# Patient Record
Sex: FEMALE | Race: WHITE | NOT HISPANIC OR LATINO | Employment: OTHER | URBAN - METROPOLITAN AREA
[De-identification: names, ages, dates, MRNs, and addresses within clinical notes are randomized per-mention and may not be internally consistent; named-entity substitution may affect disease eponyms.]

---

## 2017-03-30 ENCOUNTER — ALLSCRIPTS OFFICE VISIT (OUTPATIENT)
Dept: OTHER | Facility: OTHER | Age: 82
End: 2017-03-30

## 2017-05-30 DIAGNOSIS — I10 ESSENTIAL (PRIMARY) HYPERTENSION: ICD-10-CM

## 2017-05-30 DIAGNOSIS — E78.5 HYPERLIPIDEMIA: ICD-10-CM

## 2017-07-13 ENCOUNTER — APPOINTMENT (OUTPATIENT)
Dept: LAB | Facility: HOSPITAL | Age: 82
End: 2017-07-13
Attending: FAMILY MEDICINE
Payer: MEDICARE

## 2017-07-13 ENCOUNTER — TRANSCRIBE ORDERS (OUTPATIENT)
Dept: ADMINISTRATIVE | Facility: HOSPITAL | Age: 82
End: 2017-07-13

## 2017-07-13 DIAGNOSIS — E78.5 HYPERLIPIDEMIA: ICD-10-CM

## 2017-07-13 DIAGNOSIS — I10 ESSENTIAL (PRIMARY) HYPERTENSION: ICD-10-CM

## 2017-07-13 LAB
ALBUMIN SERPL BCP-MCNC: 3.5 G/DL (ref 3.5–5)
ALP SERPL-CCNC: 89 U/L (ref 46–116)
ALT SERPL W P-5'-P-CCNC: 44 U/L (ref 12–78)
ANION GAP SERPL CALCULATED.3IONS-SCNC: 7 MMOL/L (ref 4–13)
AST SERPL W P-5'-P-CCNC: 22 U/L (ref 5–45)
BILIRUB SERPL-MCNC: 0.6 MG/DL (ref 0.2–1)
BUN SERPL-MCNC: 24 MG/DL (ref 5–25)
CALCIUM SERPL-MCNC: 8.7 MG/DL (ref 8.3–10.1)
CHLORIDE SERPL-SCNC: 106 MMOL/L (ref 100–108)
CHOLEST SERPL-MCNC: 147 MG/DL (ref 50–200)
CO2 SERPL-SCNC: 30 MMOL/L (ref 21–32)
CREAT SERPL-MCNC: 1.27 MG/DL (ref 0.6–1.3)
GFR SERPL CREATININE-BSD FRML MDRD: 40.5 ML/MIN/1.73SQ M
GLUCOSE P FAST SERPL-MCNC: 81 MG/DL (ref 65–99)
HDLC SERPL-MCNC: 77 MG/DL (ref 40–60)
LDLC SERPL CALC-MCNC: 58 MG/DL (ref 0–100)
POTASSIUM SERPL-SCNC: 3.9 MMOL/L (ref 3.5–5.3)
PROT SERPL-MCNC: 6.8 G/DL (ref 6.4–8.2)
SODIUM SERPL-SCNC: 143 MMOL/L (ref 136–145)
TRIGL SERPL-MCNC: 59 MG/DL

## 2017-07-13 PROCEDURE — 80053 COMPREHEN METABOLIC PANEL: CPT

## 2017-07-13 PROCEDURE — 36415 COLL VENOUS BLD VENIPUNCTURE: CPT

## 2017-07-13 PROCEDURE — 80061 LIPID PANEL: CPT

## 2017-07-14 ENCOUNTER — ALLSCRIPTS OFFICE VISIT (OUTPATIENT)
Dept: OTHER | Facility: OTHER | Age: 82
End: 2017-07-14

## 2017-08-24 ENCOUNTER — HOSPITAL ENCOUNTER (OUTPATIENT)
Dept: RADIOLOGY | Facility: HOSPITAL | Age: 82
Discharge: HOME | End: 2017-08-24
Attending: FAMILY MEDICINE
Payer: MEDICARE

## 2017-08-24 ENCOUNTER — ALLSCRIPTS OFFICE VISIT (OUTPATIENT)
Dept: OTHER | Facility: OTHER | Age: 82
End: 2017-08-24

## 2017-08-24 ENCOUNTER — TRANSCRIBE ORDERS (OUTPATIENT)
Dept: ADMINISTRATIVE | Facility: HOSPITAL | Age: 82
End: 2017-08-24

## 2017-08-24 DIAGNOSIS — M25.562 PAIN IN LEFT KNEE: ICD-10-CM

## 2017-08-24 DIAGNOSIS — M25.561 PAIN IN RIGHT KNEE: ICD-10-CM

## 2017-08-24 PROCEDURE — 73562 X-RAY EXAM OF KNEE 3: CPT

## 2017-10-13 DIAGNOSIS — R35.0 FREQUENCY OF MICTURITION: ICD-10-CM

## 2017-10-13 DIAGNOSIS — E78.5 HYPERLIPIDEMIA: ICD-10-CM

## 2017-10-13 DIAGNOSIS — I10 ESSENTIAL (PRIMARY) HYPERTENSION: ICD-10-CM

## 2017-10-16 ENCOUNTER — TRANSCRIBE ORDERS (OUTPATIENT)
Dept: ADMINISTRATIVE | Facility: HOSPITAL | Age: 82
End: 2017-10-16

## 2017-10-16 ENCOUNTER — APPOINTMENT (OUTPATIENT)
Dept: LAB | Facility: HOSPITAL | Age: 82
End: 2017-10-16
Attending: FAMILY MEDICINE
Payer: MEDICARE

## 2017-10-16 DIAGNOSIS — I10 ESSENTIAL HYPERTENSION, MALIGNANT: ICD-10-CM

## 2017-10-16 DIAGNOSIS — E78.5 HYPERLIPIDEMIA, UNSPECIFIED HYPERLIPIDEMIA TYPE: ICD-10-CM

## 2017-10-16 DIAGNOSIS — R35.0 URINARY FREQUENCY: ICD-10-CM

## 2017-10-16 DIAGNOSIS — I10 ESSENTIAL HYPERTENSION, MALIGNANT: Primary | ICD-10-CM

## 2017-10-16 LAB
ALBUMIN SERPL BCP-MCNC: 3.5 G/DL (ref 3.5–5)
ALP SERPL-CCNC: 84 U/L (ref 46–116)
ALT SERPL W P-5'-P-CCNC: 29 U/L (ref 12–78)
ANION GAP SERPL CALCULATED.3IONS-SCNC: 7 MMOL/L (ref 4–13)
AST SERPL W P-5'-P-CCNC: 22 U/L (ref 5–45)
BILIRUB SERPL-MCNC: 0.6 MG/DL (ref 0.2–1)
BILIRUB UR QL STRIP: NEGATIVE
BUN SERPL-MCNC: 31 MG/DL (ref 5–25)
CALCIUM SERPL-MCNC: 9 MG/DL (ref 8.3–10.1)
CHLORIDE SERPL-SCNC: 107 MMOL/L (ref 100–108)
CHOLEST SERPL-MCNC: 143 MG/DL (ref 50–200)
CLARITY UR: CLEAR
CO2 SERPL-SCNC: 29 MMOL/L (ref 21–32)
COLOR UR: YELLOW
CREAT SERPL-MCNC: 1.37 MG/DL (ref 0.6–1.3)
GFR SERPL CREATININE-BSD FRML MDRD: 36 ML/MIN/1.73SQ M
GLUCOSE P FAST SERPL-MCNC: 87 MG/DL (ref 65–99)
GLUCOSE UR STRIP-MCNC: NEGATIVE MG/DL
HDLC SERPL-MCNC: 71 MG/DL (ref 40–60)
HGB UR QL STRIP.AUTO: NEGATIVE
KETONES UR STRIP-MCNC: NEGATIVE MG/DL
LDLC SERPL CALC-MCNC: 60 MG/DL (ref 0–100)
LEUKOCYTE ESTERASE UR QL STRIP: NEGATIVE
NITRITE UR QL STRIP: NEGATIVE
PH UR STRIP.AUTO: 6 [PH] (ref 5–9)
POTASSIUM SERPL-SCNC: 4.5 MMOL/L (ref 3.5–5.3)
PROT SERPL-MCNC: 6.9 G/DL (ref 6.4–8.2)
PROT UR STRIP-MCNC: NEGATIVE MG/DL
SODIUM SERPL-SCNC: 143 MMOL/L (ref 136–145)
SP GR UR STRIP.AUTO: 1.02 (ref 1–1.03)
TRIGL SERPL-MCNC: 60 MG/DL
UROBILINOGEN UR QL STRIP.AUTO: 0.2 E.U./DL

## 2017-10-16 PROCEDURE — 81003 URINALYSIS AUTO W/O SCOPE: CPT | Performed by: FAMILY MEDICINE

## 2017-10-16 PROCEDURE — 80053 COMPREHEN METABOLIC PANEL: CPT

## 2017-10-16 PROCEDURE — 36415 COLL VENOUS BLD VENIPUNCTURE: CPT

## 2017-10-16 PROCEDURE — 80061 LIPID PANEL: CPT

## 2017-10-19 ENCOUNTER — GENERIC CONVERSION - ENCOUNTER (OUTPATIENT)
Dept: OTHER | Facility: OTHER | Age: 82
End: 2017-10-19

## 2018-01-11 NOTE — MISCELLANEOUS
Message   Recorded as Task   Date: 07/20/2016 03:24 PM, Created By: Jennifer Mcneill   Task Name: Miscellaneous   Assigned To: [facility],team   Regarding Patient: Oren Machuca, Status: Active   Comment:    Jennifer Mcneill - 20 Jul 2016 3:24 PM     TASK CREATED  Caller: Marcos Pierce, Adult Child; Other; [phone]  dr Luis Jauregui pt pts daughter called to ask you about a vitamin that is sometimes ordered to take with the med namenda  would like a call back   Andie Kelley - 22 Jul 2016 2:23 PM     TASK EDITED  DR GALLEGOS - PT'S DAUGHTER DUSTY IS CALLING ABOUT HER MEDICATION NAMENDA  SHE NEEDS TO SPEAK TO YOU ABOUT IT    SHE WANTS A CALL BACK TODAY  Rossy Bhatia - 26 Jul 2016 4:59 PM     TASK EDITED  DR GALLEGOS - PT'S DAUGHTER DUSTY IS CALLING ABOUT HER MEDICATION NAMENDA  SHE SAID THERE IS A VITAMIN THE PT CAN ALSO TAKE WHILE TAKING THIS MEDICATION  SHE HAS BEEN TRYING TO CALL SINCE LAST WEEK AND NO ONE HAS CALLED HER BACK YET    SHE WANTS A CALL BACK TODAY  Willem Gallegos - 29 Jul 2016 4:15 PM     TASK REPLIED TO: Previously Assigned To Willem Gallegos    Pt called back to discuss dementia and possible vitamin use   Called and left message on machine        Signatures   Electronically signed by : DOMINIQUE Gardner ; Aug  1 2016  2:59PM EST                       (Author)

## 2018-01-13 VITALS
HEIGHT: 62 IN | BODY MASS INDEX: 26.31 KG/M2 | OXYGEN SATURATION: 96 % | HEART RATE: 60 BPM | SYSTOLIC BLOOD PRESSURE: 142 MMHG | DIASTOLIC BLOOD PRESSURE: 86 MMHG | RESPIRATION RATE: 16 BRPM | WEIGHT: 143 LBS | TEMPERATURE: 97.5 F

## 2018-01-14 VITALS
WEIGHT: 143 LBS | SYSTOLIC BLOOD PRESSURE: 150 MMHG | RESPIRATION RATE: 18 BRPM | HEART RATE: 72 BPM | BODY MASS INDEX: 26.31 KG/M2 | TEMPERATURE: 72 F | DIASTOLIC BLOOD PRESSURE: 100 MMHG | OXYGEN SATURATION: 96 % | HEIGHT: 62 IN

## 2018-01-14 VITALS
OXYGEN SATURATION: 97 % | TEMPERATURE: 97.5 F | RESPIRATION RATE: 18 BRPM | DIASTOLIC BLOOD PRESSURE: 62 MMHG | SYSTOLIC BLOOD PRESSURE: 108 MMHG | HEIGHT: 62 IN | HEART RATE: 56 BPM | BODY MASS INDEX: 26.13 KG/M2 | WEIGHT: 142 LBS

## 2018-01-22 VITALS
HEIGHT: 62 IN | SYSTOLIC BLOOD PRESSURE: 140 MMHG | WEIGHT: 142 LBS | BODY MASS INDEX: 26.13 KG/M2 | HEART RATE: 61 BPM | DIASTOLIC BLOOD PRESSURE: 80 MMHG | OXYGEN SATURATION: 96 % | RESPIRATION RATE: 18 BRPM

## 2018-01-24 ENCOUNTER — OFFICE VISIT (OUTPATIENT)
Dept: FAMILY MEDICINE CLINIC | Facility: CLINIC | Age: 83
End: 2018-01-24
Payer: MEDICARE

## 2018-01-24 VITALS
TEMPERATURE: 97.5 F | HEIGHT: 62 IN | OXYGEN SATURATION: 96 % | DIASTOLIC BLOOD PRESSURE: 78 MMHG | SYSTOLIC BLOOD PRESSURE: 118 MMHG | HEART RATE: 60 BPM | RESPIRATION RATE: 18 BRPM | BODY MASS INDEX: 27.64 KG/M2 | WEIGHT: 150.2 LBS

## 2018-01-24 DIAGNOSIS — R39.81 URINARY INCONTINENCE DUE TO COGNITIVE IMPAIRMENT: ICD-10-CM

## 2018-01-24 DIAGNOSIS — G30.1 LATE ONSET ALZHEIMER'S DISEASE WITHOUT BEHAVIORAL DISTURBANCE (HCC): ICD-10-CM

## 2018-01-24 DIAGNOSIS — I10 ESSENTIAL HYPERTENSION: Primary | ICD-10-CM

## 2018-01-24 DIAGNOSIS — E78.2 HYPERLIPEMIA, MIXED: ICD-10-CM

## 2018-01-24 DIAGNOSIS — F02.80 LATE ONSET ALZHEIMER'S DISEASE WITHOUT BEHAVIORAL DISTURBANCE (HCC): ICD-10-CM

## 2018-01-24 LAB
SL AMB  POCT GLUCOSE, UA: NORMAL
SL AMB LEUKOCYTE ESTERASE,UA: NEGATIVE
SL AMB POCT BILIRUBIN,UA: NEGATIVE
SL AMB POCT BLOOD,UA: NEGATIVE
SL AMB POCT CLARITY,UA: CLEAR
SL AMB POCT COLOR,UA: YELLOW
SL AMB POCT KETONES,UA: NEGATIVE
SL AMB POCT NITRITE,UA: NEGATIVE
SL AMB POCT PH,UA: 7
SL AMB POCT SPECIFIC GRAVITY,UA: 1.01

## 2018-01-24 PROCEDURE — 99214 OFFICE O/P EST MOD 30 MIN: CPT | Performed by: FAMILY MEDICINE

## 2018-01-24 RX ORDER — ATORVASTATIN CALCIUM 80 MG/1
1 TABLET, FILM COATED ORAL DAILY
COMMUNITY
Start: 2014-02-19 | End: 2019-01-14 | Stop reason: SDUPTHER

## 2018-01-24 RX ORDER — MEMANTINE HYDROCHLORIDE 28 MG/1
1 CAPSULE, EXTENDED RELEASE ORAL DAILY
COMMUNITY
Start: 2016-03-17 | End: 2018-02-02 | Stop reason: SDUPTHER

## 2018-01-24 RX ORDER — NEBIVOLOL 10 MG/1
1 TABLET ORAL DAILY
COMMUNITY
Start: 2014-09-16 | End: 2019-03-01 | Stop reason: SDUPTHER

## 2018-01-24 RX ORDER — NAPROXEN 375 MG/1
1 TABLET ORAL EVERY 12 HOURS PRN
COMMUNITY
Start: 2017-10-19

## 2018-01-24 RX ORDER — OXYBUTYNIN CHLORIDE 5 MG/1
1 TABLET, EXTENDED RELEASE ORAL DAILY
COMMUNITY
Start: 2015-10-26 | End: 2018-08-24 | Stop reason: ALTCHOICE

## 2018-01-24 RX ORDER — LISINOPRIL 20 MG/1
1 TABLET ORAL DAILY
COMMUNITY
Start: 2014-12-03 | End: 2018-03-06 | Stop reason: SDUPTHER

## 2018-01-24 RX ORDER — ASPIRIN 81 MG/1
81 TABLET, CHEWABLE ORAL DAILY
COMMUNITY

## 2018-01-24 NOTE — PROGRESS NOTES
Assessment/Plan:    No problem-specific Assessment & Plan notes found for this encounter  Diagnoses and all orders for this visit:    Essential hypertension    Late onset Alzheimer's disease without behavioral disturbance    Hyperlipemia, mixed    Other orders  -     aspirin 81 mg chewable tablet; Chew 81 mg daily  -     atorvastatin (LIPITOR) 80 mg tablet; Take 1 tablet by mouth daily  -     nebivolol (BYSTOLIC) 10 mg tablet; Take 1 tablet by mouth daily  -     lisinopril (ZESTRIL) 20 mg tablet; Take 1 tablet by mouth daily  -     Memantine HCl ER (NAMENDA XR) 28 MG CP24; Take 1 capsule by mouth daily  -     naproxen (NAPROSYN) 375 mg tablet; Take 1 tablet by mouth every 12 (twelve) hours as needed  -     oxybutynin (DITROPAN-XL) 5 mg 24 hr tablet; Take 1 tablet by mouth daily  -     POCT urine dip        Subjective:      Patient ID: Justyn Durán is a 81 y o  female  HPI  F/u appt for hypertension  Son states Mom is having urinary incontinence at night  Mom is also having periods of confusion  Pt has dementia  No other new complaints  Lipid panel has been stable and doesn't need repeat now  The following portions of the patient's history were reviewed and updated as appropriate: current medications, past family history, past medical history, past social history, past surgical history and problem list     Review of Systems   Genitourinary:        Urinary incontinence   Psychiatric/Behavioral: Positive for confusion  All other systems reviewed and are negative  Objective:     Physical Exam   Constitutional: She appears well-developed and well-nourished  HENT:   Head: Normocephalic and atraumatic  Cardiovascular: Normal rate and regular rhythm  Pulmonary/Chest: Effort normal and breath sounds normal    Musculoskeletal: Normal range of motion  Neurological: She is alert     Confused at times but not during this visit

## 2018-01-24 NOTE — PATIENT INSTRUCTIONS
Pt will continue her present meds for hypertension and hyperlipidemia which are well controlled  Pt will continue Dementia meds also  Urinary incontinence is a function of dementia    Pt will f/u in 3 months to check hypertension

## 2018-02-02 DIAGNOSIS — G30.8 ALZHEIMER'S DISEASE OF OTHER ONSET WITHOUT BEHAVIORAL DISTURBANCE: Primary | ICD-10-CM

## 2018-02-02 DIAGNOSIS — F02.80 ALZHEIMER'S DISEASE OF OTHER ONSET WITHOUT BEHAVIORAL DISTURBANCE: Primary | ICD-10-CM

## 2018-02-02 RX ORDER — MEMANTINE HYDROCHLORIDE 28 MG/1
1 CAPSULE, EXTENDED RELEASE ORAL DAILY
Qty: 30 CAPSULE | Refills: 4 | Status: SHIPPED | OUTPATIENT
Start: 2018-02-02 | End: 2018-08-01 | Stop reason: SDUPTHER

## 2018-03-06 DIAGNOSIS — I10 ESSENTIAL HYPERTENSION: Primary | ICD-10-CM

## 2018-03-06 RX ORDER — LISINOPRIL 20 MG/1
20 TABLET ORAL DAILY
Qty: 30 TABLET | Refills: 5 | Status: SHIPPED | OUTPATIENT
Start: 2018-03-06 | End: 2018-09-03 | Stop reason: SDUPTHER

## 2018-04-25 ENCOUNTER — OFFICE VISIT (OUTPATIENT)
Dept: FAMILY MEDICINE CLINIC | Facility: CLINIC | Age: 83
End: 2018-04-25
Payer: MEDICARE

## 2018-04-25 VITALS
HEIGHT: 61 IN | OXYGEN SATURATION: 95 % | WEIGHT: 150 LBS | BODY MASS INDEX: 28.32 KG/M2 | HEART RATE: 58 BPM | RESPIRATION RATE: 16 BRPM | SYSTOLIC BLOOD PRESSURE: 110 MMHG | DIASTOLIC BLOOD PRESSURE: 70 MMHG

## 2018-04-25 DIAGNOSIS — E78.2 HYPERLIPEMIA, MIXED: ICD-10-CM

## 2018-04-25 DIAGNOSIS — B35.1 ONYCHOMYCOSIS OF TOENAIL: ICD-10-CM

## 2018-04-25 DIAGNOSIS — F02.80 LATE ONSET ALZHEIMER'S DISEASE WITHOUT BEHAVIORAL DISTURBANCE (HCC): ICD-10-CM

## 2018-04-25 DIAGNOSIS — G30.1 LATE ONSET ALZHEIMER'S DISEASE WITHOUT BEHAVIORAL DISTURBANCE (HCC): ICD-10-CM

## 2018-04-25 DIAGNOSIS — I10 ESSENTIAL HYPERTENSION: Primary | ICD-10-CM

## 2018-04-25 PROCEDURE — 99214 OFFICE O/P EST MOD 30 MIN: CPT | Performed by: FAMILY MEDICINE

## 2018-04-25 NOTE — PROGRESS NOTES
Assessment/Plan:    No problem-specific Assessment & Plan notes found for this encounter  Diagnoses and all orders for this visit:    Essential hypertension    Late onset Alzheimer's disease without behavioral disturbance    Hyperlipemia, mixed    Onychomycosis of toenail        Subjective:      Patient ID: Shaheen Mckeon is a 81 y o  female  This is an 81 y o female the f/u for hypertension  Patient also is on medications for this problem  The patient lives with her son and  he denies any new problems  The following portions of the patient's history were reviewed and updated as appropriate: allergies, current medications, past family history, past medical history, past social history, past surgical history and problem list     Review of Systems   Constitutional: Negative  Respiratory: Negative  Cardiovascular: Negative  Neurological: Negative  Psychiatric/Behavioral: Positive for confusion  Objective:      /70 (BP Location: Left arm, Patient Position: Sitting)   Pulse 58   Resp 16   Ht 5' 0 5" (1 537 m)   Wt 68 kg (150 lb)   SpO2 95%   BMI 28 81 kg/m²          Physical Exam   Constitutional: She is oriented to person, place, and time  Patient is mildly overweight with a BMI of 28 1   Cardiovascular: Normal rate and regular rhythm  Pulmonary/Chest: Effort normal and breath sounds normal    Musculoskeletal: Normal range of motion  Neurological: She is alert and oriented to person, place, and time  Psychiatric: She has a normal mood and affect  Her behavior is normal    Vitals reviewed

## 2018-04-25 NOTE — PROGRESS NOTES
Assessment/Plan:    No problem-specific Assessment & Plan notes found for this encounter  Diagnoses and all orders for this visit:    Essential hypertension    Late onset Alzheimer's disease without behavioral disturbance    Hyperlipemia, mixed    Onychomycosis of toenail        Subjective:      Patient ID: Elham Valdez is a 81 y o  female  This is a follow-up appointment for the 81year-old patient with history of hypertension and dementia  The patient arrived with her son today  The patient did fall yesterday but states no injuries  Her son notes that her dementia is gradually getting worse  She denies any other new complaints today other than some dry eyes when she wakes up in the morning  Her son also notes that she is yellow thickened toenails bilaterally  Presently the son he is cutting her toenails without any difficulty  The following portions of the patient's history were reviewed and updated as appropriate: allergies, current medications, past family history, past medical history, past social history, past surgical history and problem list     Review of Systems   Constitutional: Negative  Eyes:        Patient c/o of dry eyes in the morning  Cardiovascular: Negative  Genitourinary:        Patient has urinary incontinence and is wearing adult diapers   Musculoskeletal: Negative  Psychiatric/Behavioral: Positive for confusion  Patient is having increasing periods of confusion  Objective:      /70 (BP Location: Left arm, Patient Position: Sitting)   Pulse 58   Resp 16   Ht 5' 0 5" (1 537 m)   Wt 68 kg (150 lb)   SpO2 95%   BMI 28 81 kg/m²          Physical Exam   Constitutional: She is oriented to person, place, and time  Patient is mildly overweight with BMI 28 81   Eyes: Conjunctivae and EOM are normal  Pupils are equal, round, and reactive to light  Cardiovascular: Normal rate, regular rhythm and normal heart sounds      Pulmonary/Chest: Effort normal and breath sounds normal    Neurological: She is alert and oriented to person, place, and time  Skin:   The patient has thickened yellow toenails on both feet  Psychiatric: She has a normal mood and affect  Her behavior is normal    Vitals reviewed

## 2018-04-25 NOTE — PATIENT INSTRUCTIONS
The patient blood pressure is stable today  Patient will continue her dose of hypertensive medications  The patient will also continue her medications for Alzheimer's  Her hyperlipidemia is also stable  Next lipid panel scheduled for this fall  Patient will follow up with me in 3 months for recheck of her blood pressure  Patient is on a mycosis of the toenails but her son will continue to care for her nails at this point time  The patient also has urinary incontinence and will continue with his with this problem    Patient will also continue to work told by persistent into your urinary incontinence which is  most likely related to her dementia

## 2018-06-18 ENCOUNTER — TELEPHONE (OUTPATIENT)
Dept: FAMILY MEDICINE CLINIC | Facility: CLINIC | Age: 83
End: 2018-06-18

## 2018-06-18 DIAGNOSIS — D22.9 MULTIPLE ATYPICAL NEVI: Primary | ICD-10-CM

## 2018-06-18 NOTE — TELEPHONE ENCOUNTER
Pt s son called to request an order for pt to see dr Debby [place] to check some moles on her back  would like a call when ready

## 2018-08-01 DIAGNOSIS — G30.8 ALZHEIMER'S DISEASE OF OTHER ONSET WITHOUT BEHAVIORAL DISTURBANCE: ICD-10-CM

## 2018-08-01 DIAGNOSIS — F02.80 ALZHEIMER'S DISEASE OF OTHER ONSET WITHOUT BEHAVIORAL DISTURBANCE: ICD-10-CM

## 2018-08-04 RX ORDER — MEMANTINE HYDROCHLORIDE 28 MG/1
1 CAPSULE, EXTENDED RELEASE ORAL DAILY
Qty: 30 CAPSULE | Refills: 5 | Status: SHIPPED | OUTPATIENT
Start: 2018-08-04 | End: 2019-02-02 | Stop reason: SDUPTHER

## 2018-08-24 ENCOUNTER — OFFICE VISIT (OUTPATIENT)
Dept: FAMILY MEDICINE CLINIC | Facility: CLINIC | Age: 83
End: 2018-08-24
Payer: MEDICARE

## 2018-08-24 VITALS
WEIGHT: 151 LBS | HEART RATE: 65 BPM | BODY MASS INDEX: 29 KG/M2 | SYSTOLIC BLOOD PRESSURE: 120 MMHG | OXYGEN SATURATION: 96 % | DIASTOLIC BLOOD PRESSURE: 86 MMHG | RESPIRATION RATE: 16 BRPM

## 2018-08-24 DIAGNOSIS — I10 ESSENTIAL HYPERTENSION: Primary | ICD-10-CM

## 2018-08-24 DIAGNOSIS — F02.80 LATE ONSET ALZHEIMER'S DISEASE WITHOUT BEHAVIORAL DISTURBANCE (HCC): ICD-10-CM

## 2018-08-24 DIAGNOSIS — G30.1 LATE ONSET ALZHEIMER'S DISEASE WITHOUT BEHAVIORAL DISTURBANCE (HCC): ICD-10-CM

## 2018-08-24 DIAGNOSIS — E78.2 HYPERLIPEMIA, MIXED: ICD-10-CM

## 2018-08-24 DIAGNOSIS — R39.81 URINARY INCONTINENCE DUE TO COGNITIVE IMPAIRMENT: ICD-10-CM

## 2018-08-24 PROCEDURE — 99214 OFFICE O/P EST MOD 30 MIN: CPT | Performed by: FAMILY MEDICINE

## 2018-08-24 RX ORDER — TERBINAFINE HYDROCHLORIDE 250 MG/1
250 TABLET ORAL DAILY
Refills: 0 | COMMUNITY
Start: 2018-07-23 | End: 2018-10-25 | Stop reason: SDUPTHER

## 2018-08-24 NOTE — PATIENT INSTRUCTIONS
The patient's blood pressure and hyperlipidemia are well controlled with medication  These meds will continue  The patient will continue Namenda for her dementia at this point  Her dementia is getting worse  The patient will get a repeat lipid panel in October  Patient will follow up with me in 3 months for her hypertension and dementia  She will stop the oxybutynin for urinary incontinence  Her urinary incontinence is secondary to her dementia

## 2018-08-24 NOTE — PROGRESS NOTES
Assessment/Plan:    No problem-specific Assessment & Plan notes found for this encounter  Diagnoses and all orders for this visit:    Essential hypertension    Late onset Alzheimer's disease without behavioral disturbance    Hyperlipemia, mixed  -     Lipid panel; Future  -     Comprehensive metabolic panel; Future    Urinary incontinence due to cognitive impairment    Other orders  -     terbinafine (LamISIL) 250 mg tablet; Take 250 mg by mouth daily        Subjective:      Patient ID: Charline Montoya is a 81 y o  female  This is a follow-up appointment for 67-year-old female with history of hypertension, hyperlipidemia and dementia  The patient also has a history of urinary incontinence and the prescribed oxybutynin isn't helping  Since her last visit her son notes that her memory is worsening  The patient is having difficulty answering questions and forming sentences  There is family history of dementia includes her 2 sisters and her mother  This son states they have a caretaker in the home 6 days a week secondary to the dementia  The patient is wearing depends diapers at times but forgets to put them on in the early a m  her son states that he and his sister plan to care for the mom at home in spite of the dementia  The following portions of the patient's history were reviewed and updated as appropriate: allergies, current medications, past family history, past medical history, past social history, past surgical history and problem list     Review of Systems   Constitutional: Negative  Respiratory: Negative  Cardiovascular: Negative  Musculoskeletal: Negative  Neurological: Positive for speech difficulty  Psychiatric/Behavioral: Positive for confusion           Objective:      /86 (BP Location: Left arm, Patient Position: Sitting)   Pulse 65   Resp 16   Wt 68 5 kg (151 lb)   SpO2 96%   BMI 29 00 kg/m²          Physical Exam   Constitutional: She appears well-developed and well-nourished  The patient is overweight with a BMI of 29 00   Cardiovascular: Normal rate, regular rhythm, normal heart sounds and intact distal pulses  Pulmonary/Chest: Effort normal and breath sounds normal    Musculoskeletal: Normal range of motion  Neurological:   The patient appears confused at times during this exam    Psychiatric: She has a normal mood and affect  Her behavior is normal    Patient's thought process and judgment are impaired  Vitals reviewed

## 2018-09-03 DIAGNOSIS — I10 ESSENTIAL HYPERTENSION: ICD-10-CM

## 2018-09-03 RX ORDER — LISINOPRIL 20 MG/1
TABLET ORAL
Qty: 30 TABLET | Refills: 5 | Status: SHIPPED | OUTPATIENT
Start: 2018-09-03 | End: 2019-03-13 | Stop reason: SDUPTHER

## 2018-10-24 LAB
ALBUMIN SERPL-MCNC: 4.1 G/DL (ref 3.5–4.7)
ALBUMIN/GLOB SERPL: 1.6 {RATIO} (ref 1.2–2.2)
ALP SERPL-CCNC: 80 IU/L (ref 39–117)
ALT SERPL-CCNC: 24 IU/L (ref 0–32)
AST SERPL-CCNC: 27 IU/L (ref 0–40)
BILIRUB SERPL-MCNC: 0.3 MG/DL (ref 0–1.2)
BUN SERPL-MCNC: 22 MG/DL (ref 8–27)
BUN/CREAT SERPL: 19 (ref 12–28)
CALCIUM SERPL-MCNC: 9.1 MG/DL (ref 8.7–10.3)
CHLORIDE SERPL-SCNC: 105 MMOL/L (ref 96–106)
CHOLEST SERPL-MCNC: 149 MG/DL (ref 100–199)
CO2 SERPL-SCNC: 27 MMOL/L (ref 20–29)
CREAT SERPL-MCNC: 1.17 MG/DL (ref 0.57–1)
GLOBULIN SER-MCNC: 2.5 G/DL (ref 1.5–4.5)
GLUCOSE SERPL-MCNC: 90 MG/DL (ref 65–99)
HDLC SERPL-MCNC: 64 MG/DL
LABCORP COMMENT: NORMAL
LDLC SERPL CALC-MCNC: 72 MG/DL (ref 0–99)
POTASSIUM SERPL-SCNC: 4.6 MMOL/L (ref 3.5–5.2)
PROT SERPL-MCNC: 6.6 G/DL (ref 6–8.5)
SL AMB EGFR AFRICAN AMERICAN: 50 ML/MIN/1.73
SL AMB EGFR NON AFRICAN AMERICAN: 43 ML/MIN/1.73
SL AMB VLDL CHOLESTEROL CALC: 13 MG/DL (ref 5–40)
SODIUM SERPL-SCNC: 145 MMOL/L (ref 134–144)
TRIGL SERPL-MCNC: 66 MG/DL (ref 0–149)

## 2018-10-25 DIAGNOSIS — B35.1 ONYCHOMYCOSIS: Primary | ICD-10-CM

## 2018-10-26 RX ORDER — TERBINAFINE HYDROCHLORIDE 250 MG/1
250 TABLET ORAL DAILY
Qty: 90 TABLET | Refills: 1 | Status: SHIPPED | OUTPATIENT
Start: 2018-10-26 | End: 2019-05-16 | Stop reason: SDUPTHER

## 2018-11-27 ENCOUNTER — APPOINTMENT (EMERGENCY)
Dept: RADIOLOGY | Facility: HOSPITAL | Age: 83
End: 2018-11-27
Payer: MEDICARE

## 2018-11-27 ENCOUNTER — TELEPHONE (OUTPATIENT)
Dept: FAMILY MEDICINE CLINIC | Facility: CLINIC | Age: 83
End: 2018-11-27

## 2018-11-27 ENCOUNTER — HOSPITAL ENCOUNTER (EMERGENCY)
Facility: HOSPITAL | Age: 83
Discharge: HOME | End: 2018-11-27
Attending: EMERGENCY MEDICINE | Admitting: EMERGENCY MEDICINE
Payer: MEDICARE

## 2018-11-27 VITALS
HEART RATE: 56 BPM | SYSTOLIC BLOOD PRESSURE: 158 MMHG | OXYGEN SATURATION: 98 % | DIASTOLIC BLOOD PRESSURE: 66 MMHG | RESPIRATION RATE: 20 BRPM | TEMPERATURE: 97.1 F

## 2018-11-27 DIAGNOSIS — R55 NEAR SYNCOPE: Primary | ICD-10-CM

## 2018-11-27 LAB
ALBUMIN SERPL BCP-MCNC: 3.3 G/DL (ref 3.5–5)
ALP SERPL-CCNC: 95 U/L (ref 46–116)
ALT SERPL W P-5'-P-CCNC: 33 U/L (ref 12–78)
ANION GAP SERPL CALCULATED.3IONS-SCNC: 5 MMOL/L (ref 4–13)
AST SERPL W P-5'-P-CCNC: 25 U/L (ref 5–45)
ATRIAL RATE: 58 BPM
BASOPHILS # BLD AUTO: 0.07 THOUSANDS/ΜL (ref 0–0.1)
BASOPHILS NFR BLD AUTO: 1 % (ref 0–1)
BILIRUB SERPL-MCNC: 0.4 MG/DL (ref 0.2–1)
BILIRUB UR QL STRIP: NEGATIVE
BUN SERPL-MCNC: 22 MG/DL (ref 5–25)
CALCIUM SERPL-MCNC: 8.5 MG/DL (ref 8.3–10.1)
CHLORIDE SERPL-SCNC: 108 MMOL/L (ref 100–108)
CLARITY UR: CLEAR
CO2 SERPL-SCNC: 30 MMOL/L (ref 21–32)
COLOR UR: NORMAL
CREAT SERPL-MCNC: 1.13 MG/DL (ref 0.6–1.3)
EOSINOPHIL # BLD AUTO: 0.21 THOUSAND/ΜL (ref 0–0.61)
EOSINOPHIL NFR BLD AUTO: 3 % (ref 0–6)
ERYTHROCYTE [DISTWIDTH] IN BLOOD BY AUTOMATED COUNT: 13.4 % (ref 11.6–15.1)
GFR SERPL CREATININE-BSD FRML MDRD: 45 ML/MIN/1.73SQ M
GLUCOSE SERPL-MCNC: 88 MG/DL (ref 65–140)
GLUCOSE UR STRIP-MCNC: NEGATIVE MG/DL
HCT VFR BLD AUTO: 42.6 % (ref 34.8–46.1)
HGB BLD-MCNC: 13.9 G/DL (ref 11.5–15.4)
HGB UR QL STRIP.AUTO: NEGATIVE
IMM GRANULOCYTES # BLD AUTO: 0.04 THOUSAND/UL (ref 0–0.2)
IMM GRANULOCYTES NFR BLD AUTO: 1 % (ref 0–2)
KETONES UR STRIP-MCNC: NEGATIVE MG/DL
LEUKOCYTE ESTERASE UR QL STRIP: NEGATIVE
LYMPHOCYTES # BLD AUTO: 1.96 THOUSANDS/ΜL (ref 0.6–4.47)
LYMPHOCYTES NFR BLD AUTO: 30 % (ref 14–44)
MCH RBC QN AUTO: 32.9 PG (ref 26.8–34.3)
MCHC RBC AUTO-ENTMCNC: 32.6 G/DL (ref 31.4–37.4)
MCV RBC AUTO: 101 FL (ref 82–98)
MONOCYTES # BLD AUTO: 0.72 THOUSAND/ΜL (ref 0.17–1.22)
MONOCYTES NFR BLD AUTO: 11 % (ref 4–12)
NEUTROPHILS # BLD AUTO: 3.47 THOUSANDS/ΜL (ref 1.85–7.62)
NEUTS SEG NFR BLD AUTO: 54 % (ref 43–75)
NITRITE UR QL STRIP: NEGATIVE
NRBC BLD AUTO-RTO: 0 /100 WBCS
P AXIS: 63 DEGREES
PH UR STRIP.AUTO: 7 [PH] (ref 5–9)
PLATELET # BLD AUTO: 272 THOUSANDS/UL (ref 149–390)
PMV BLD AUTO: 11.2 FL (ref 8.9–12.7)
POTASSIUM SERPL-SCNC: 3.9 MMOL/L (ref 3.5–5.3)
PR INTERVAL: 200 MS
PROT SERPL-MCNC: 6.7 G/DL (ref 6.4–8.2)
PROT UR STRIP-MCNC: NEGATIVE MG/DL
QRS AXIS: -9 DEGREES
QRSD INTERVAL: 84 MS
QT INTERVAL: 454 MS
QTC INTERVAL: 445 MS
RBC # BLD AUTO: 4.23 MILLION/UL (ref 3.81–5.12)
SODIUM SERPL-SCNC: 143 MMOL/L (ref 136–145)
SP GR UR STRIP.AUTO: 1.01 (ref 1–1.03)
T WAVE AXIS: 60 DEGREES
TROPONIN I SERPL-MCNC: <0.02 NG/ML
UROBILINOGEN UR QL STRIP.AUTO: 0.2 E.U./DL
VENTRICULAR RATE: 58 BPM
WBC # BLD AUTO: 6.47 THOUSAND/UL (ref 4.31–10.16)

## 2018-11-27 PROCEDURE — 73521 X-RAY EXAM HIPS BI 2 VIEWS: CPT

## 2018-11-27 PROCEDURE — 99285 EMERGENCY DEPT VISIT HI MDM: CPT

## 2018-11-27 PROCEDURE — 85025 COMPLETE CBC W/AUTO DIFF WBC: CPT | Performed by: EMERGENCY MEDICINE

## 2018-11-27 PROCEDURE — 36415 COLL VENOUS BLD VENIPUNCTURE: CPT | Performed by: EMERGENCY MEDICINE

## 2018-11-27 PROCEDURE — 84484 ASSAY OF TROPONIN QUANT: CPT | Performed by: EMERGENCY MEDICINE

## 2018-11-27 PROCEDURE — 70450 CT HEAD/BRAIN W/O DYE: CPT

## 2018-11-27 PROCEDURE — 93010 ELECTROCARDIOGRAM REPORT: CPT | Performed by: INTERNAL MEDICINE

## 2018-11-27 PROCEDURE — 72125 CT NECK SPINE W/O DYE: CPT

## 2018-11-27 PROCEDURE — 81003 URINALYSIS AUTO W/O SCOPE: CPT | Performed by: EMERGENCY MEDICINE

## 2018-11-27 PROCEDURE — 70486 CT MAXILLOFACIAL W/O DYE: CPT

## 2018-11-27 PROCEDURE — 93005 ELECTROCARDIOGRAM TRACING: CPT

## 2018-11-27 PROCEDURE — 80053 COMPREHEN METABOLIC PANEL: CPT | Performed by: EMERGENCY MEDICINE

## 2018-11-27 NOTE — TELEPHONE ENCOUNTER
Dr Dax Solares spouse [place] came to cancel the apt today and let you know that she is at the EM    thanks

## 2018-11-27 NOTE — ED PROVIDER NOTES
History  Chief Complaint   Patient presents with    Dizziness     as per EMS, they were called for dizziness  ALS triaged to [facility]  Blood sugar 96  Patient offers no complaints  Patient is an 81-year-old female with pretty significant dementia who was brought in by squad for an episode at home were coursing to the son the patient was walking and suddenly had to lean on the table and she told her son she would feel well, the son went over and she, slumped and him any helped to the ground, and no time she lost consciousness during this episode  Here the patient has no physical complaints, no headache, no dizziness, no chest pain, no abdominal pain, no nausea  Patient is awake and alert done oriented to time or date  Prior to Admission Medications   Prescriptions Last Dose Informant Patient Reported? Taking? NAMENDA XR 28 MG CP24   No No   Sig: take 1 capsule by mouth daily   aspirin 81 mg chewable tablet   Yes No   Sig: Chew 81 mg daily   atorvastatin (LIPITOR) 80 mg tablet   Yes No   Sig: Take 1 tablet by mouth daily   lisinopril (ZESTRIL) 20 mg tablet   No No   Sig: take 1 tablet by mouth once daily   naproxen (NAPROSYN) 375 mg tablet   Yes No   Sig: Take 1 tablet by mouth every 12 (twelve) hours as needed   nebivolol (BYSTOLIC) 10 mg tablet   Yes No   Sig: Take 1 tablet by mouth daily   terbinafine (LamISIL) 250 mg tablet   No No   Sig: Take 1 tablet (250 mg total) by mouth daily for 90 days      Facility-Administered Medications: None       Past Medical History:   Diagnosis Date    Encounter for screening for osteoporosis        No past surgical history on file  Family History   Problem Relation Age of Onset    Alzheimer's disease Mother     Heart disease Father     Alzheimer's disease Family     Heart disease Family      I have reviewed and agree with the history as documented      Social History   Substance Use Topics    Smoking status: Current Every Day Smoker    Smokeless tobacco: Not on file    Alcohol use No        Review of Systems   Unable to perform ROS: Dementia   Respiratory: Negative for chest tightness and shortness of breath  Cardiovascular: Negative for chest pain  Gastrointestinal: Negative for nausea and vomiting  Musculoskeletal: Negative for back pain and neck pain  Neurological: Negative for weakness and numbness  Psychiatric/Behavioral: Negative  Physical Exam  Physical Exam   Constitutional: She appears well-developed and well-nourished  HENT:   Head: Normocephalic and atraumatic  Eyes: Pupils are equal, round, and reactive to light  EOM are normal    Neck: Normal range of motion  Cardiovascular: Normal rate and regular rhythm  Pulmonary/Chest: Effort normal and breath sounds normal  No respiratory distress  Abdominal: Soft  Bowel sounds are normal  She exhibits no distension  There is no tenderness  Musculoskeletal: Normal range of motion  Neurological: She is alert  No cranial nerve deficit  Skin: Skin is warm  There is an old bruise to the patient's right cheek   Psychiatric: She has a normal mood and affect  Nursing note and vitals reviewed        Vital Signs  ED Triage Vitals   Temperature Pulse Respirations Blood Pressure SpO2   11/27/18 0709 11/27/18 0708 11/27/18 0708 11/27/18 0708 11/27/18 0708   (!) 97 1 °F (36 2 °C) 56 12 (!) 189/83 96 %      Temp Source Heart Rate Source Patient Position - Orthostatic VS BP Location FiO2 (%)   11/27/18 0708 11/27/18 0708 11/27/18 0708 11/27/18 0708 --   Oral Monitor Lying Left arm       Pain Score       11/27/18 0708       No Pain           Vitals:    11/27/18 0708 11/27/18 0715 11/27/18 0745 11/27/18 1015   BP: (!) 189/83 (!) 213/97 (!) 166/116 158/66   Pulse: 56 56 62 56   Patient Position - Orthostatic VS: Lying Lying Lying Lying       Visual Acuity      ED Medications  Medications - No data to display    Diagnostic Studies  Results Reviewed     Procedure Component Value Units Date/Time Comprehensive metabolic panel [[id number]]  (Abnormal) Collected:  11/27/18 0808    Lab Status:  Final result Specimen:  Blood from Arm, Right Updated:  11/27/18 0834     Sodium 143 mmol/L      Potassium 3 9 mmol/L      Chloride 108 mmol/L      CO2 30 mmol/L      ANION GAP 5 mmol/L      BUN 22 mg/dL      Creatinine 1 13 mg/dL      Glucose 88 mg/dL      Calcium 8 5 mg/dL      AST 25 U/L      ALT 33 U/L      Alkaline Phosphatase 95 U/L      Total Protein 6 7 g/dL      Albumin 3 3 (L) g/dL      Total Bilirubin 0 40 mg/dL      eGFR 45 ml/min/1 73sq m     Narrative:         National Kidney Disease Education Program recommendations are as follows:  GFR calculation is accurate only with a steady state creatinine  Chronic Kidney disease less than 60 ml/min/1 73 sq  meters  Kidney failure less than 15 ml/min/1 73 sq  meters      UA w Reflex to Microscopic [[id number]] Collected:  11/27/18 0746    Lab Status:  Final result Specimen:  Urine from Urine, Clean Catch Updated:  11/27/18 0752     Color, UA Light Yellow     Clarity, UA Clear     Specific Gravity, UA 1 010     pH, UA 7 0     Leukocytes, UA Negative     Nitrite, UA Negative     Protein, UA Negative mg/dl      Glucose, UA Negative mg/dl      Ketones, UA Negative mg/dl      Urobilinogen, UA 0 2 E U /dl      Bilirubin, UA Negative     Blood, UA Negative    Troponin I [[id number]]  (Normal) Collected:  11/27/18 0724    Lab Status:  Final result Specimen:  Blood from Arm, Right Updated:  11/27/18 0748     Troponin I <0 02 ng/mL     CBC and differential [[id number]]  (Abnormal) Collected:  11/27/18 0724    Lab Status:  Final result Specimen:  Blood from Arm, Right Updated:  11/27/18 0730     WBC 6 47 Thousand/uL      RBC 4 23 Million/uL      Hemoglobin 13 9 g/dL      Hematocrit 42 6 %       (H) fL      MCH 32 9 pg      MCHC 32 6 g/dL      RDW 13 4 %      MPV 11 2 fL      Platelets 801 Thousands/uL      nRBC 0 /100 WBCs      Neutrophils Relative 54 %      Immat GRANS % 1 %      Lymphocytes Relative 30 %      Monocytes Relative 11 %      Eosinophils Relative 3 %      Basophils Relative 1 %      Neutrophils Absolute 3 47 Thousands/µL      Immature Grans Absolute 0 04 Thousand/uL      Lymphocytes Absolute 1 96 Thousands/µL      Monocytes Absolute 0 72 Thousand/µL      Eosinophils Absolute 0 21 Thousand/µL      Basophils Absolute 0 07 Thousands/µL                  CT facial bones without contrast   Final Result by Romeo Garcia MD (11/27 3218)      No traumatic injury identified  Workstation performed: [id number]         CT cervical spine without contrast   Final Result by Romeo Garcia MD (11/27 2455)      1  No cervical spine fracture or traumatic malalignment  Loss of the normal cervical lordosis which is likely secondary to patient positioning, cervical collar, or muscle spasm  Ligamentous injury cannot be entirely excluded  2   Emphysematous change  Workstation performed: [id number]         CT head without contrast   Final Result by Romeo Garcia MD (11/27 0398)      No acute intracranial abnormality  Microangiopathic changes  Workstation performed: [id number]         XR hips bilateral with ap pelvis 2 vw   Final Result by Brent Fountain MD (11/27 7854)      No acute osseous abnormality  Osteoarthritis of the hips  Chronic disc degeneration lower lumbar spine              Workstation performed: [id number]                    Procedures  ECG 12 Lead Documentation  Date/Time: 11/27/2018 7:04 AM  Performed by: Khris Mon  Authorized by: Khris Mon     Indications / Diagnosis:  Dizziness  ECG reviewed by me, the ED Provider: yes    Patient location:  ED  Previous ECG:     Comparison to cardiac monitor: Yes    Interpretation:     Interpretation: abnormal    Rate:     ECG rate:  58    ECG rate assessment: bradycardic    Rhythm:     Rhythm: sinus rhythm    Ectopy:     Ectopy: none    QRS:     QRS axis: Normal  Conduction:     Conduction: normal    ST segments:     ST segments:  Normal  T waves:     T waves: normal             Phone Contacts  ED Phone Contact    ED Course                               MDM  Number of Diagnoses or Management Options  Near syncope:   Diagnosis management comments: The patient's workup showed no acute abnormalities, discussed all findings with the patient's son  The patient states he feels great and would like to go home  I recommend some follow up with the primary care doctor in several days or bring his mother back to the emergency room for any worsening of symptoms  All the patient's questions were answered to the best my ability and the son agrees with the assessment plan  Amount and/or Complexity of Data Reviewed  Clinical lab tests: ordered and reviewed  Tests in the radiology section of CPT®: ordered and reviewed      CritCare Time    Disposition  Final diagnoses:   Near syncope     Time reflects when diagnosis was documented in both MDM as applicable and the Disposition within this note     Time User Action Codes Description Comment    11/27/2018 10:11 AM Lori Henriquez Noah [R55] Near syncope       ED Disposition     ED Disposition Condition Comment    Discharge  Dhara Humphrey discharge to home/self care      Condition at discharge: Stable        Follow-up Information     Follow up With Specialties Details Why Contact Willard Dunbar DO Family Medicine Schedule an appointment as soon as possible for a visit in 2 days  [address]   [phone]            Discharge Medication List as of 11/27/2018 10:12 AM      CONTINUE these medications which have NOT CHANGED    Details   aspirin 81 mg chewable tablet Chew 81 mg daily, Historical Med      atorvastatin (LIPITOR) 80 mg tablet Take 1 tablet by mouth daily, Starting Wed 2/19/2014, Historical Med      lisinopril (ZESTRIL) 20 mg tablet take 1 tablet by mouth once daily, Normal      NAMENDA XR 28 MG CP24 take 1 capsule by mouth daily, Starting Sat 8/4/2018, Normal      naproxen (NAPROSYN) 375 mg tablet Take 1 tablet by mouth every 12 (twelve) hours as needed, Starting Thu 10/19/2017, Historical Med      nebivolol (BYSTOLIC) 10 mg tablet Take 1 tablet by mouth daily, Starting Tue 9/16/2014, Historical Med      terbinafine (LamISIL) 250 mg tablet Take 1 tablet (250 mg total) by mouth daily for 90 days, Starting Fri 10/26/2018, Until Thu 1/24/2019, Normal           No discharge procedures on file      ED Provider  Electronically Signed by           Julissa Mueller MD  11/28/18 0985

## 2018-11-27 NOTE — DISCHARGE INSTRUCTIONS
Syncope   WHAT YOU NEED TO KNOW:   Syncope is also called fainting or passing out  Syncope is a sudden, temporary loss of consciousness, followed by a fall from a standing or sitting position  Syncope ranges from not serious to a sign of a more serious condition that needs to be treated  You can control some health conditions that cause syncope  Your healthcare providers can help you create a plan to manage syncope and prevent episodes  DISCHARGE INSTRUCTIONS:   Seek care immediately if:   · You are bleeding because you hit your head when you fainted  · You suddenly have double vision, difficulty speaking, numbness, and cannot move your arms or legs  · You have chest pain and trouble breathing  · You vomit blood or material that looks like coffee grounds  · You see blood in your bowel movement  Contact your healthcare provider if:   · You have new or worsening symptoms  · You have another syncope episode  · You have a headache, fast heartbeat, or feel too dizzy to stand up  · You have questions or concerns about your condition or care  Follow up with your healthcare provider as directed:  Write down your questions so you remember to ask them during your visits  Manage syncope:   · Keep a record of your syncope episodes  Include your symptoms and your activity before and after the episode  The record can help your healthcare provider find the cause of your syncope and help you manage episodes  · Sit or lie down when needed  This includes when you feel dizzy, your throat is getting tight, and your vision changes  Raise your legs above the level of your heart  · Take slow, deep breaths if you start to breathe faster with anxiety or fear  This can help decrease dizziness and the feeling that you might faint  · Check your blood pressure often  This is important if you take medicine to lower your blood pressure   Check your blood pressure when you are lying down and when you are standing  Ask how often to check during the day  Keep a record of your blood pressure numbers  Your healthcare provider may use the record to help plan your treatment  Prevent a syncope episode:   · Move slowly and let yourself get used to one position before you move to another position  This is very important when you change from a lying or sitting position to a standing position  Take some deep breaths before you stand up from a lying position  Stand up slowly  Sudden movements may cause a fainting spell  Sit on the side of the bed or couch for a few minutes before you stand up  If you are on bedrest, try to be upright for about 2 hours each day, or as directed  Do not lock your legs if you are standing for a long period of time  Move your legs and bend your knees to keep blood flowing  · Follow your healthcare provider's recommendations  Your provider may  recommend that you drink more liquids to prevent dehydration  You may also need to have more salt to keep your blood pressure from dropping too low and causing syncope  Your provider will tell you how much liquid and sodium to have each day  · Watch for signs of low blood sugar  These include hunger, nervousness, sweating, and fast or fluttery heartbeats  Talk with your healthcare provider about ways to keep your blood sugar level steady  · Do not strain if you are constipated  You may faint if you strain to have a bowel movement  Walking is the best way to get your bowels moving  Eat foods high in fiber to make it easier to have a bowel movement  Good examples are high-fiber cereals, beans, vegetables, and whole-grain breads  Prune juice may help make bowel movements softer  · Be careful in hot weather  Heat can cause a syncope episode  Limit activity done outside on hot days  Physical activity in hot weather can lead to dehydration  This can cause an episode    © 2017 [employer] Information is for End User's use only and may not be sold, redistributed or otherwise used for commercial purposes  All illustrations and images included in CareNotes® are the copyrighted property of A D A M , Inc  or Estevan Valdovinos  The above information is an  only  It is not intended as medical advice for individual conditions or treatments  Talk to your doctor, nurse or pharmacist before following any medical regimen to see if it is safe and effective for you

## 2018-11-27 NOTE — ED NOTES
Ambulated pt around nurses station per Dr Citlaly Rojas  Pt ambulates with steady gait, no c/o dizziness       Bess [facility]  11/27/18 1013

## 2018-12-13 RX ORDER — A/SINGAPORE/GP1908/2015 IVR-180 (H1N1) (AN A/MICHIGAN/45/2015 (H1N1)PDM09-LIKE VIRUS), A/HONG KONG/4801/2014, NYMC X-263B (H3N2) (AN A/HONG KONG/4801/2014-LIKE VIRUS), AND B/BRISBANE/60/2008, WILD TYPE (A B/BRISBANE/60/2008-LIKE VIRUS) 15; 15; 15 UG/.5ML; UG/.5ML; UG/.5ML
INJECTION, SUSPENSION INTRAMUSCULAR
Refills: 0 | COMMUNITY
Start: 2018-09-26 | End: 2018-12-14

## 2018-12-14 ENCOUNTER — OFFICE VISIT (OUTPATIENT)
Dept: FAMILY MEDICINE CLINIC | Facility: CLINIC | Age: 83
End: 2018-12-14
Payer: MEDICARE

## 2018-12-14 VITALS
SYSTOLIC BLOOD PRESSURE: 138 MMHG | TEMPERATURE: 96.5 F | WEIGHT: 154 LBS | BODY MASS INDEX: 29.58 KG/M2 | OXYGEN SATURATION: 95 % | RESPIRATION RATE: 18 BRPM | HEART RATE: 72 BPM | DIASTOLIC BLOOD PRESSURE: 92 MMHG

## 2018-12-14 DIAGNOSIS — G30.1 LATE ONSET ALZHEIMER'S DISEASE WITHOUT BEHAVIORAL DISTURBANCE (HCC): ICD-10-CM

## 2018-12-14 DIAGNOSIS — F02.80 LATE ONSET ALZHEIMER'S DISEASE WITHOUT BEHAVIORAL DISTURBANCE (HCC): ICD-10-CM

## 2018-12-14 DIAGNOSIS — R39.81 URINARY INCONTINENCE DUE TO COGNITIVE IMPAIRMENT: ICD-10-CM

## 2018-12-14 DIAGNOSIS — I10 ESSENTIAL HYPERTENSION: Primary | ICD-10-CM

## 2018-12-14 DIAGNOSIS — E78.2 HYPERLIPEMIA, MIXED: ICD-10-CM

## 2018-12-14 LAB
BACTERIA UR QL AUTO: NORMAL /HPF
BILIRUB UR QL STRIP: NEGATIVE
CLARITY UR: NORMAL
COLOR UR: YELLOW
GLUCOSE UR STRIP-MCNC: NEGATIVE MG/DL
HGB UR QL STRIP.AUTO: NEGATIVE
HYALINE CASTS #/AREA URNS LPF: NORMAL /LPF
KETONES UR STRIP-MCNC: NEGATIVE MG/DL
LEUKOCYTE ESTERASE UR QL STRIP: NEGATIVE
NITRITE UR QL STRIP: NEGATIVE
NON-SQ EPI CELLS URNS QL MICRO: NORMAL /HPF
PH UR STRIP.AUTO: 7.5 [PH] (ref 4.5–8)
PROT UR STRIP-MCNC: NEGATIVE MG/DL
RBC #/AREA URNS AUTO: NORMAL /HPF
SP GR UR STRIP.AUTO: 1.01 (ref 1–1.03)
UROBILINOGEN UR QL STRIP.AUTO: 0.2 E.U./DL
WBC #/AREA URNS AUTO: NORMAL /HPF

## 2018-12-14 PROCEDURE — 81001 URINALYSIS AUTO W/SCOPE: CPT | Performed by: FAMILY MEDICINE

## 2018-12-14 PROCEDURE — 99214 OFFICE O/P EST MOD 30 MIN: CPT | Performed by: FAMILY MEDICINE

## 2018-12-14 PROCEDURE — 87086 URINE CULTURE/COLONY COUNT: CPT | Performed by: FAMILY MEDICINE

## 2018-12-14 NOTE — PATIENT INSTRUCTIONS
The patient's hypertension hyperlipidemia controlled with present medications  Her son states he forgot to give her her medication for blood pressure today which explains why it is mildly elevated  The patient could not urinate today and she was given a urine sample cup to rule out infection secondary to the urinary incontinence  Urine screens in the past have been negative and I think the incontinence is related to her dementia  The patient will continue her dementia medications  Patient will follow up with me in 3 months to check her blood pressure  Her son will call me if he needs further help getting  care for her his mother with her progressing dementia

## 2018-12-14 NOTE — PROGRESS NOTES
Assessment/Plan:    No problem-specific Assessment & Plan notes found for this encounter  Diagnoses and all orders for this visit:    Essential hypertension    Hyperlipemia, mixed    Urinary incontinence due to cognitive impairment  -     Urinalysis with microscopic  -     Urine culture    Late onset Alzheimer's disease without behavioral disturbance        Subjective:      Patient ID: Steph Escudero is a 81 y o  female  This is a follow-up appointment for an 69-year-old white female who has a history of hypertension and dementia  The patient also has a history of hypercholesterolemia  The patient has been a smoker for over 60 years  He denies any new complaints  The patient has fallen twice since her last visit probably related to her dementia and balance issues  Her son states there is caretakers who come to the house when he is out shopping  The patient denies any new complaints  The following portions of the patient's history were reviewed and updated as appropriate: allergies, current medications, past family history, past medical history, past social history, past surgical history and problem list     Review of Systems   Constitutional: Negative  Respiratory: Negative  Cardiovascular: Negative  Genitourinary:        The patient is having periods of incontinence  Neurological: Negative  Psychiatric/Behavioral: Positive for confusion and decreased concentration  Objective:      /92   Pulse 72   Temp (!) 96 5 °F (35 8 °C)   Resp 18   Wt 69 9 kg (154 lb)   SpO2 95%   BMI 29 58 kg/m²          Physical Exam   Constitutional: She appears well-developed and well-nourished  The patient is overweight with a BMI of 29 58   Cardiovascular: Normal rate, regular rhythm and normal heart sounds  Pulmonary/Chest: Effort normal and breath sounds normal    Musculoskeletal: Normal range of motion  Psychiatric: She has a normal mood and affect   Her behavior is normal  Thought content normal    Judgment is becoming more impaired with her dementia  Vitals reviewed

## 2018-12-15 LAB — BACTERIA UR CULT: NORMAL

## 2019-01-14 DIAGNOSIS — E78.2 MIXED HYPERLIPIDEMIA: Primary | ICD-10-CM

## 2019-01-14 DIAGNOSIS — I10 ESSENTIAL HYPERTENSION: Primary | ICD-10-CM

## 2019-01-15 RX ORDER — ATORVASTATIN CALCIUM 80 MG/1
TABLET, FILM COATED ORAL
Qty: 90 TABLET | Refills: 3 | Status: SHIPPED | OUTPATIENT
Start: 2019-01-15 | End: 2020-01-02

## 2019-01-17 ENCOUNTER — TELEPHONE (OUTPATIENT)
Dept: FAMILY MEDICINE CLINIC | Facility: CLINIC | Age: 84
End: 2019-01-17

## 2019-01-17 NOTE — TELEPHONE ENCOUNTER
Dr Gissell Rowan  Patient's son came in saying they received a letter from their insurance company stating they were no longer covering [address], unless it was medically necessary  Asking for you to please write a letter explaining the need for his mother to continue on this medication   Please call when letter is available to   Thank you  ciro

## 2019-01-23 ENCOUNTER — OFFICE VISIT (OUTPATIENT)
Dept: FAMILY MEDICINE CLINIC | Facility: CLINIC | Age: 84
End: 2019-01-23
Payer: MEDICARE

## 2019-01-23 ENCOUNTER — TELEPHONE (OUTPATIENT)
Dept: FAMILY MEDICINE CLINIC | Facility: CLINIC | Age: 84
End: 2019-01-23

## 2019-01-23 VITALS
BODY MASS INDEX: 28.51 KG/M2 | SYSTOLIC BLOOD PRESSURE: 127 MMHG | OXYGEN SATURATION: 95 % | HEART RATE: 78 BPM | WEIGHT: 151 LBS | DIASTOLIC BLOOD PRESSURE: 70 MMHG | HEIGHT: 61 IN | TEMPERATURE: 97.6 F

## 2019-01-23 DIAGNOSIS — J01.40 ACUTE NON-RECURRENT PANSINUSITIS: Primary | ICD-10-CM

## 2019-01-23 PROCEDURE — 99213 OFFICE O/P EST LOW 20 MIN: CPT | Performed by: NURSE PRACTITIONER

## 2019-01-23 RX ORDER — FLUTICASONE PROPIONATE 50 MCG
2 SPRAY, SUSPENSION (ML) NASAL DAILY
Qty: 16 G | Refills: 0 | Status: ON HOLD | OUTPATIENT
Start: 2019-01-23 | End: 2020-01-24

## 2019-01-23 RX ORDER — AMOXICILLIN AND CLAVULANATE POTASSIUM 875; 125 MG/1; MG/1
1 TABLET, FILM COATED ORAL EVERY 12 HOURS SCHEDULED
Qty: 14 TABLET | Refills: 0 | Status: SHIPPED | OUTPATIENT
Start: 2019-01-23 | End: 2019-01-30

## 2019-01-23 NOTE — PROGRESS NOTES
Assessment/Plan:    1  Take medication as prescribed   2  Follow-up condition changes or worsens     Diagnoses and all orders for this visit:    Acute non-recurrent pansinusitis  -     amoxicillin-clavulanate (AUGMENTIN) 875-125 mg per tablet; Take 1 tablet by mouth every 12 (twelve) hours for 7 days  -     fluticasone (FLONASE) 50 mcg/act nasal spray; 2 sprays into each nostril daily          Subjective:      Patient ID: Bennett Joseph is a 81 y o  female  52-year-old female presents with cough for about a week  Son reports he was  Sick with similar symptoms  Some nasal congestion  Wet cough  Denies wheezing  Tried some Mucinex  Has been using it for 3 days  Not sure if they feel as though there is any improvement  Not feel sick  The following portions of the patient's history were reviewed and updated as appropriate: allergies and current medications  Review of Systems   Constitutional: Negative  HENT: Positive for sinus pain, sinus pressure and sore throat  Respiratory: Positive for cough  Cardiovascular: Negative  Objective:      /70   Pulse 78   Temp 97 6 °F (36 4 °C)   Ht 5' 1" (1 549 m)   Wt 68 5 kg (151 lb)   SpO2 95%   BMI 28 53 kg/m²          Physical Exam   Constitutional: She appears well-developed and well-nourished  HENT:   Head: Normocephalic and atraumatic  Right Ear: External ear normal    Left Ear: External ear normal    Nose: Right sinus exhibits maxillary sinus tenderness and frontal sinus tenderness  Left sinus exhibits maxillary sinus tenderness and frontal sinus tenderness     Mouth/Throat: Oropharynx is clear and moist    Pulmonary/Chest: Effort normal and breath sounds normal    Wet cough

## 2019-02-02 DIAGNOSIS — G30.8 ALZHEIMER'S DISEASE OF OTHER ONSET WITHOUT BEHAVIORAL DISTURBANCE: ICD-10-CM

## 2019-02-02 DIAGNOSIS — F02.80 ALZHEIMER'S DISEASE OF OTHER ONSET WITHOUT BEHAVIORAL DISTURBANCE: ICD-10-CM

## 2019-02-03 RX ORDER — MEMANTINE HYDROCHLORIDE 28 MG/1
1 CAPSULE, EXTENDED RELEASE ORAL DAILY
Qty: 30 CAPSULE | Refills: 5 | Status: SHIPPED | OUTPATIENT
Start: 2019-02-03 | End: 2019-08-02 | Stop reason: SDUPTHER

## 2019-03-01 DIAGNOSIS — I10 ESSENTIAL HYPERTENSION: Primary | ICD-10-CM

## 2019-03-02 RX ORDER — NEBIVOLOL HYDROCHLORIDE 10 MG/1
TABLET ORAL
Qty: 90 TABLET | Refills: 4 | Status: SHIPPED | OUTPATIENT
Start: 2019-03-02 | End: 2019-05-10 | Stop reason: SDUPTHER

## 2019-03-13 ENCOUNTER — OFFICE VISIT (OUTPATIENT)
Dept: FAMILY MEDICINE CLINIC | Facility: CLINIC | Age: 84
End: 2019-03-13
Payer: MEDICARE

## 2019-03-13 VITALS
HEART RATE: 57 BPM | BODY MASS INDEX: 29.1 KG/M2 | SYSTOLIC BLOOD PRESSURE: 100 MMHG | DIASTOLIC BLOOD PRESSURE: 64 MMHG | TEMPERATURE: 97 F | RESPIRATION RATE: 18 BRPM | WEIGHT: 154 LBS | OXYGEN SATURATION: 98 %

## 2019-03-13 DIAGNOSIS — F02.80 LATE ONSET ALZHEIMER'S DISEASE WITHOUT BEHAVIORAL DISTURBANCE (HCC): Primary | ICD-10-CM

## 2019-03-13 DIAGNOSIS — E78.2 HYPERLIPEMIA, MIXED: ICD-10-CM

## 2019-03-13 DIAGNOSIS — I10 ESSENTIAL HYPERTENSION: ICD-10-CM

## 2019-03-13 DIAGNOSIS — G30.1 LATE ONSET ALZHEIMER'S DISEASE WITHOUT BEHAVIORAL DISTURBANCE (HCC): Primary | ICD-10-CM

## 2019-03-13 PROCEDURE — 99214 OFFICE O/P EST MOD 30 MIN: CPT | Performed by: FAMILY MEDICINE

## 2019-03-13 RX ORDER — LISINOPRIL 20 MG/1
TABLET ORAL
Qty: 30 TABLET | Refills: 5 | Status: SHIPPED | OUTPATIENT
Start: 2019-03-13 | End: 2019-03-13 | Stop reason: SDUPTHER

## 2019-03-13 RX ORDER — LISINOPRIL 20 MG/1
20 TABLET ORAL DAILY
Qty: 30 TABLET | Refills: 5 | Status: SHIPPED | OUTPATIENT
Start: 2019-03-13 | End: 2019-03-13

## 2019-03-13 RX ORDER — LISINOPRIL 10 MG/1
10 TABLET ORAL DAILY
Qty: 30 TABLET | Refills: 5 | Status: SHIPPED | OUTPATIENT
Start: 2019-03-13 | End: 2019-10-04 | Stop reason: SDUPTHER

## 2019-03-13 NOTE — PATIENT INSTRUCTIONS
Patient's blood pressure is lower today it could be an explanation for her fall  The patient's lisinopril will be decreased to 10 mg daily  The patient will continue her other medicines for hypertension and for dementia  Patient will follow up with me in 3 months for hypertension and dementia  She is not due for a next lipid panel on to October

## 2019-03-15 RX ORDER — NEBIVOLOL HYDROCHLORIDE 10 MG/1
TABLET ORAL
Qty: 90 TABLET | Refills: 4 | Status: ON HOLD | OUTPATIENT
Start: 2019-03-15 | End: 2019-05-12 | Stop reason: SDUPTHER

## 2019-05-08 ENCOUNTER — TELEPHONE (OUTPATIENT)
Dept: FAMILY MEDICINE CLINIC | Facility: CLINIC | Age: 84
End: 2019-05-08

## 2019-05-10 ENCOUNTER — OFFICE VISIT (OUTPATIENT)
Dept: FAMILY MEDICINE CLINIC | Facility: CLINIC | Age: 84
End: 2019-05-10
Payer: MEDICARE

## 2019-05-10 ENCOUNTER — HOSPITAL ENCOUNTER (INPATIENT)
Facility: HOSPITAL | Age: 84
LOS: 1 days | Discharge: HOME | DRG: 948 | End: 2019-05-12
Attending: EMERGENCY MEDICINE | Admitting: FAMILY MEDICINE
Payer: MEDICARE

## 2019-05-10 ENCOUNTER — APPOINTMENT (EMERGENCY)
Dept: RADIOLOGY | Facility: HOSPITAL | Age: 84
DRG: 948 | End: 2019-05-10
Payer: MEDICARE

## 2019-05-10 VITALS
OXYGEN SATURATION: 96 % | WEIGHT: 154 LBS | HEART RATE: 79 BPM | SYSTOLIC BLOOD PRESSURE: 112 MMHG | HEIGHT: 61 IN | RESPIRATION RATE: 18 BRPM | DIASTOLIC BLOOD PRESSURE: 64 MMHG | BODY MASS INDEX: 29.07 KG/M2 | TEMPERATURE: 97.3 F

## 2019-05-10 DIAGNOSIS — F02.80 LATE ONSET ALZHEIMER'S DISEASE WITHOUT BEHAVIORAL DISTURBANCE (HCC): ICD-10-CM

## 2019-05-10 DIAGNOSIS — G30.1 LATE ONSET ALZHEIMER'S DISEASE WITHOUT BEHAVIORAL DISTURBANCE (HCC): ICD-10-CM

## 2019-05-10 DIAGNOSIS — I10 ESSENTIAL HYPERTENSION: ICD-10-CM

## 2019-05-10 DIAGNOSIS — R23.0 TOE CYANOSIS: ICD-10-CM

## 2019-05-10 DIAGNOSIS — R60.9 PERIPHERAL EDEMA: Primary | ICD-10-CM

## 2019-05-10 DIAGNOSIS — M79.89 LEG SWELLING: Primary | ICD-10-CM

## 2019-05-10 DIAGNOSIS — Z72.0 TOBACCO ABUSE: ICD-10-CM

## 2019-05-10 PROBLEM — R00.1 SINUS BRADYCARDIA: Status: ACTIVE | Noted: 2019-05-10

## 2019-05-10 PROBLEM — M17.12 OSTEOARTHRITIS OF LEFT KNEE: Status: ACTIVE | Noted: 2017-08-25

## 2019-05-10 LAB
ALBUMIN SERPL BCP-MCNC: 3.4 G/DL (ref 3.5–5)
ALP SERPL-CCNC: 92 U/L (ref 46–116)
ALT SERPL W P-5'-P-CCNC: 31 U/L (ref 12–78)
ANION GAP SERPL CALCULATED.3IONS-SCNC: 7 MMOL/L (ref 4–13)
APTT PPP: 27 SECONDS (ref 26–38)
AST SERPL W P-5'-P-CCNC: 34 U/L (ref 5–45)
BASOPHILS # BLD AUTO: 0.04 THOUSANDS/ΜL (ref 0–0.1)
BASOPHILS NFR BLD AUTO: 1 % (ref 0–1)
BILIRUB SERPL-MCNC: 0.4 MG/DL (ref 0.2–1)
BILIRUB UR QL STRIP: NEGATIVE
BUN SERPL-MCNC: 26 MG/DL (ref 5–25)
CALCIUM SERPL-MCNC: 8.7 MG/DL (ref 8.3–10.1)
CHLORIDE SERPL-SCNC: 106 MMOL/L (ref 100–108)
CLARITY UR: CLEAR
CO2 SERPL-SCNC: 28 MMOL/L (ref 21–32)
COLOR UR: NORMAL
CREAT SERPL-MCNC: 1.1 MG/DL (ref 0.6–1.3)
EOSINOPHIL # BLD AUTO: 0.2 THOUSAND/ΜL (ref 0–0.61)
EOSINOPHIL NFR BLD AUTO: 3 % (ref 0–6)
ERYTHROCYTE [DISTWIDTH] IN BLOOD BY AUTOMATED COUNT: 13.2 % (ref 11.6–15.1)
GFR SERPL CREATININE-BSD FRML MDRD: 47 ML/MIN/1.73SQ M
GLUCOSE SERPL-MCNC: 94 MG/DL (ref 65–140)
GLUCOSE UR STRIP-MCNC: NEGATIVE MG/DL
HCT VFR BLD AUTO: 43.6 % (ref 34.8–46.1)
HGB BLD-MCNC: 14 G/DL (ref 11.5–15.4)
HGB UR QL STRIP.AUTO: NEGATIVE
IMM GRANULOCYTES # BLD AUTO: 0.02 THOUSAND/UL (ref 0–0.2)
IMM GRANULOCYTES NFR BLD AUTO: 0 % (ref 0–2)
INR PPP: 0.99 (ref 0.86–1.16)
KETONES UR STRIP-MCNC: NEGATIVE MG/DL
LEUKOCYTE ESTERASE UR QL STRIP: NEGATIVE
LYMPHOCYTES # BLD AUTO: 2.14 THOUSANDS/ΜL (ref 0.6–4.47)
LYMPHOCYTES NFR BLD AUTO: 29 % (ref 14–44)
MCH RBC QN AUTO: 32.1 PG (ref 26.8–34.3)
MCHC RBC AUTO-ENTMCNC: 32.1 G/DL (ref 31.4–37.4)
MCV RBC AUTO: 100 FL (ref 82–98)
MONOCYTES # BLD AUTO: 0.81 THOUSAND/ΜL (ref 0.17–1.22)
MONOCYTES NFR BLD AUTO: 11 % (ref 4–12)
NEUTROPHILS # BLD AUTO: 4.17 THOUSANDS/ΜL (ref 1.85–7.62)
NEUTS SEG NFR BLD AUTO: 56 % (ref 43–75)
NITRITE UR QL STRIP: NEGATIVE
NRBC BLD AUTO-RTO: 0 /100 WBCS
NT-PROBNP SERPL-MCNC: 358 PG/ML
PH UR STRIP.AUTO: 6.5 [PH]
PLATELET # BLD AUTO: 226 THOUSANDS/UL (ref 149–390)
PMV BLD AUTO: 10.2 FL (ref 8.9–12.7)
POTASSIUM SERPL-SCNC: 4.4 MMOL/L (ref 3.5–5.3)
PROT SERPL-MCNC: 7 G/DL (ref 6.4–8.2)
PROT UR STRIP-MCNC: NEGATIVE MG/DL
PROTHROMBIN TIME: 10.4 SECONDS (ref 9.4–11.7)
RBC # BLD AUTO: 4.36 MILLION/UL (ref 3.81–5.12)
SODIUM SERPL-SCNC: 141 MMOL/L (ref 136–145)
SP GR UR STRIP.AUTO: <=1.005 (ref 1–1.03)
TROPONIN I SERPL-MCNC: <0.02 NG/ML
UROBILINOGEN UR QL STRIP.AUTO: 0.2 E.U./DL
WBC # BLD AUTO: 7.38 THOUSAND/UL (ref 4.31–10.16)

## 2019-05-10 PROCEDURE — 99220 PR INITIAL OBSERVATION CARE/DAY 70 MINUTES: CPT | Performed by: FAMILY MEDICINE

## 2019-05-10 PROCEDURE — 1124F ACP DISCUSS-NO DSCNMKR DOCD: CPT | Performed by: INTERNAL MEDICINE

## 2019-05-10 PROCEDURE — 85730 THROMBOPLASTIN TIME PARTIAL: CPT | Performed by: PHYSICIAN ASSISTANT

## 2019-05-10 PROCEDURE — 96374 THER/PROPH/DIAG INJ IV PUSH: CPT

## 2019-05-10 PROCEDURE — 93005 ELECTROCARDIOGRAM TRACING: CPT

## 2019-05-10 PROCEDURE — 80053 COMPREHEN METABOLIC PANEL: CPT | Performed by: PHYSICIAN ASSISTANT

## 2019-05-10 PROCEDURE — 99285 EMERGENCY DEPT VISIT HI MDM: CPT

## 2019-05-10 PROCEDURE — 85025 COMPLETE CBC W/AUTO DIFF WBC: CPT | Performed by: PHYSICIAN ASSISTANT

## 2019-05-10 PROCEDURE — 83880 ASSAY OF NATRIURETIC PEPTIDE: CPT | Performed by: PHYSICIAN ASSISTANT

## 2019-05-10 PROCEDURE — 99214 OFFICE O/P EST MOD 30 MIN: CPT | Performed by: FAMILY MEDICINE

## 2019-05-10 PROCEDURE — 81003 URINALYSIS AUTO W/O SCOPE: CPT | Performed by: PHYSICIAN ASSISTANT

## 2019-05-10 PROCEDURE — 87081 CULTURE SCREEN ONLY: CPT | Performed by: STUDENT IN AN ORGANIZED HEALTH CARE EDUCATION/TRAINING PROGRAM

## 2019-05-10 PROCEDURE — 84484 ASSAY OF TROPONIN QUANT: CPT | Performed by: PHYSICIAN ASSISTANT

## 2019-05-10 PROCEDURE — 36415 COLL VENOUS BLD VENIPUNCTURE: CPT | Performed by: PHYSICIAN ASSISTANT

## 2019-05-10 PROCEDURE — 93923 UPR/LXTR ART STDY 3+ LVLS: CPT

## 2019-05-10 PROCEDURE — 71046 X-RAY EXAM CHEST 2 VIEWS: CPT

## 2019-05-10 PROCEDURE — 85610 PROTHROMBIN TIME: CPT | Performed by: PHYSICIAN ASSISTANT

## 2019-05-10 RX ORDER — ATORVASTATIN CALCIUM 80 MG/1
80 TABLET, FILM COATED ORAL DAILY
Status: DISCONTINUED | OUTPATIENT
Start: 2019-05-10 | End: 2019-05-12 | Stop reason: HOSPADM

## 2019-05-10 RX ORDER — HYDRALAZINE HYDROCHLORIDE 20 MG/ML
5 INJECTION INTRAMUSCULAR; INTRAVENOUS ONCE
Status: COMPLETED | OUTPATIENT
Start: 2019-05-10 | End: 2019-05-10

## 2019-05-10 RX ORDER — LISINOPRIL 10 MG/1
10 TABLET ORAL DAILY
Status: DISCONTINUED | OUTPATIENT
Start: 2019-05-10 | End: 2019-05-12 | Stop reason: HOSPADM

## 2019-05-10 RX ORDER — NICOTINE 21 MG/24HR
1 PATCH, TRANSDERMAL 24 HOURS TRANSDERMAL DAILY
Status: DISCONTINUED | OUTPATIENT
Start: 2019-05-11 | End: 2019-05-12 | Stop reason: HOSPADM

## 2019-05-10 RX ORDER — ASPIRIN 81 MG/1
81 TABLET, CHEWABLE ORAL DAILY
Status: DISCONTINUED | OUTPATIENT
Start: 2019-05-10 | End: 2019-05-12 | Stop reason: HOSPADM

## 2019-05-10 RX ORDER — ACETAMINOPHEN 325 MG/1
650 TABLET ORAL EVERY 6 HOURS PRN
Status: DISCONTINUED | OUTPATIENT
Start: 2019-05-10 | End: 2019-05-12 | Stop reason: HOSPADM

## 2019-05-10 RX ORDER — MEMANTINE HYDROCHLORIDE 10 MG/1
10 TABLET ORAL DAILY
Status: DISCONTINUED | OUTPATIENT
Start: 2019-05-11 | End: 2019-05-12 | Stop reason: HOSPADM

## 2019-05-10 RX ORDER — TERBINAFINE HYDROCHLORIDE 250 MG/1
TABLET ORAL
Refills: 0 | COMMUNITY
Start: 2019-04-05 | End: 2019-05-20 | Stop reason: SDUPTHER

## 2019-05-10 RX ORDER — FLUTICASONE PROPIONATE 50 MCG
2 SPRAY, SUSPENSION (ML) NASAL DAILY
Status: DISCONTINUED | OUTPATIENT
Start: 2019-05-11 | End: 2019-05-12 | Stop reason: HOSPADM

## 2019-05-10 RX ADMIN — ATORVASTATIN CALCIUM 80 MG: 80 TABLET ORAL at 18:07

## 2019-05-10 RX ADMIN — ASPIRIN 81 MG 81 MG: 81 TABLET ORAL at 18:07

## 2019-05-10 RX ADMIN — HYDRALAZINE HYDROCHLORIDE 5 MG: 20 INJECTION INTRAMUSCULAR; INTRAVENOUS at 20:58

## 2019-05-10 RX ADMIN — LISINOPRIL 10 MG: 10 TABLET ORAL at 18:07

## 2019-05-10 RX ADMIN — HYDRALAZINE HYDROCHLORIDE 5 MG: 20 INJECTION INTRAMUSCULAR; INTRAVENOUS at 13:42

## 2019-05-11 LAB
ANION GAP SERPL CALCULATED.3IONS-SCNC: 8 MMOL/L (ref 4–13)
ATRIAL RATE: 51 BPM
BUN SERPL-MCNC: 25 MG/DL (ref 5–25)
CALCIUM SERPL-MCNC: 8.8 MG/DL (ref 8.3–10.1)
CHLORIDE SERPL-SCNC: 108 MMOL/L (ref 100–108)
CO2 SERPL-SCNC: 27 MMOL/L (ref 21–32)
CREAT SERPL-MCNC: 1.04 MG/DL (ref 0.6–1.3)
ERYTHROCYTE [DISTWIDTH] IN BLOOD BY AUTOMATED COUNT: 13.3 % (ref 11.6–15.1)
GFR SERPL CREATININE-BSD FRML MDRD: 50 ML/MIN/1.73SQ M
GLUCOSE P FAST SERPL-MCNC: 94 MG/DL (ref 65–99)
GLUCOSE SERPL-MCNC: 94 MG/DL (ref 65–140)
HCT VFR BLD AUTO: 42 % (ref 34.8–46.1)
HGB BLD-MCNC: 13.7 G/DL (ref 11.5–15.4)
MAGNESIUM SERPL-MCNC: 2.2 MG/DL (ref 1.6–2.6)
MCH RBC QN AUTO: 32 PG (ref 26.8–34.3)
MCHC RBC AUTO-ENTMCNC: 32.6 G/DL (ref 31.4–37.4)
MCV RBC AUTO: 98 FL (ref 82–98)
P AXIS: 57 DEGREES
PHOSPHATE SERPL-MCNC: 3.2 MG/DL (ref 2.3–4.1)
PLATELET # BLD AUTO: 232 THOUSANDS/UL (ref 149–390)
PMV BLD AUTO: 10.5 FL (ref 8.9–12.7)
POTASSIUM SERPL-SCNC: 3.5 MMOL/L (ref 3.5–5.3)
PR INTERVAL: 198 MS
QRS AXIS: -19 DEGREES
QRSD INTERVAL: 86 MS
QT INTERVAL: 472 MS
QTC INTERVAL: 435 MS
RBC # BLD AUTO: 4.28 MILLION/UL (ref 3.81–5.12)
SODIUM SERPL-SCNC: 143 MMOL/L (ref 136–145)
T WAVE AXIS: 55 DEGREES
VENTRICULAR RATE: 51 BPM
WBC # BLD AUTO: 7.7 THOUSAND/UL (ref 4.31–10.16)

## 2019-05-11 PROCEDURE — 83735 ASSAY OF MAGNESIUM: CPT | Performed by: STUDENT IN AN ORGANIZED HEALTH CARE EDUCATION/TRAINING PROGRAM

## 2019-05-11 PROCEDURE — 93010 ELECTROCARDIOGRAM REPORT: CPT | Performed by: INTERNAL MEDICINE

## 2019-05-11 PROCEDURE — 84100 ASSAY OF PHOSPHORUS: CPT | Performed by: STUDENT IN AN ORGANIZED HEALTH CARE EDUCATION/TRAINING PROGRAM

## 2019-05-11 PROCEDURE — 93923 UPR/LXTR ART STDY 3+ LVLS: CPT | Performed by: SURGERY

## 2019-05-11 PROCEDURE — NC001 PR NO CHARGE: Performed by: FAMILY MEDICINE

## 2019-05-11 PROCEDURE — 80048 BASIC METABOLIC PNL TOTAL CA: CPT | Performed by: STUDENT IN AN ORGANIZED HEALTH CARE EDUCATION/TRAINING PROGRAM

## 2019-05-11 PROCEDURE — 93005 ELECTROCARDIOGRAM TRACING: CPT

## 2019-05-11 PROCEDURE — 85027 COMPLETE CBC AUTOMATED: CPT | Performed by: STUDENT IN AN ORGANIZED HEALTH CARE EDUCATION/TRAINING PROGRAM

## 2019-05-11 RX ORDER — ONDANSETRON 4 MG/1
4 TABLET, ORALLY DISINTEGRATING ORAL EVERY 6 HOURS PRN
Status: DISCONTINUED | OUTPATIENT
Start: 2019-05-11 | End: 2019-05-12 | Stop reason: HOSPADM

## 2019-05-11 RX ORDER — NEBIVOLOL 5 MG/1
5 TABLET ORAL DAILY
Status: DISCONTINUED | OUTPATIENT
Start: 2019-05-11 | End: 2019-05-12

## 2019-05-11 RX ORDER — HYDRALAZINE HYDROCHLORIDE 20 MG/ML
5 INJECTION INTRAMUSCULAR; INTRAVENOUS ONCE
Status: COMPLETED | OUTPATIENT
Start: 2019-05-11 | End: 2019-05-11

## 2019-05-11 RX ORDER — FAMOTIDINE 20 MG/1
20 TABLET, FILM COATED ORAL DAILY
Status: DISCONTINUED | OUTPATIENT
Start: 2019-05-11 | End: 2019-05-12 | Stop reason: HOSPADM

## 2019-05-11 RX ORDER — HALOPERIDOL 5 MG/ML
1 INJECTION INTRAMUSCULAR ONCE AS NEEDED
Status: COMPLETED | OUTPATIENT
Start: 2019-05-11 | End: 2019-05-11

## 2019-05-11 RX ADMIN — LISINOPRIL 10 MG: 10 TABLET ORAL at 09:23

## 2019-05-11 RX ADMIN — HYDRALAZINE HYDROCHLORIDE 5 MG: 20 INJECTION INTRAMUSCULAR; INTRAVENOUS at 05:39

## 2019-05-11 RX ADMIN — ASPIRIN 81 MG 81 MG: 81 TABLET ORAL at 09:23

## 2019-05-11 RX ADMIN — NICOTINE 1 PATCH: 14 PATCH TRANSDERMAL at 09:22

## 2019-05-11 RX ADMIN — ATORVASTATIN CALCIUM 80 MG: 80 TABLET ORAL at 09:23

## 2019-05-11 RX ADMIN — NEBIVOLOL HYDROCHLORIDE 5 MG: 5 TABLET ORAL at 14:54

## 2019-05-11 RX ADMIN — FLUTICASONE PROPIONATE 2 SPRAY: 50 SPRAY, METERED NASAL at 09:23

## 2019-05-11 RX ADMIN — HALOPERIDOL LACTATE 1 MG: 5 INJECTION INTRAMUSCULAR at 22:14

## 2019-05-11 RX ADMIN — ENOXAPARIN SODIUM 40 MG: 40 INJECTION SUBCUTANEOUS at 09:22

## 2019-05-11 RX ADMIN — FAMOTIDINE 20 MG: 20 TABLET ORAL at 10:43

## 2019-05-11 RX ADMIN — MEMANTINE 10 MG: 10 TABLET ORAL at 09:22

## 2019-05-12 VITALS
SYSTOLIC BLOOD PRESSURE: 111 MMHG | WEIGHT: 149.8 LBS | HEART RATE: 59 BPM | DIASTOLIC BLOOD PRESSURE: 56 MMHG | BODY MASS INDEX: 28.28 KG/M2 | OXYGEN SATURATION: 96 % | RESPIRATION RATE: 18 BRPM | HEIGHT: 61 IN | TEMPERATURE: 98 F

## 2019-05-12 PROBLEM — M79.89 LEG SWELLING: Status: ACTIVE | Noted: 2019-05-12

## 2019-05-12 PROBLEM — M79.89 LEG SWELLING: Status: RESOLVED | Noted: 2019-05-12 | Resolved: 2019-05-12

## 2019-05-12 PROBLEM — R60.9 PERIPHERAL EDEMA: Status: RESOLVED | Noted: 2019-05-10 | Resolved: 2019-05-12

## 2019-05-12 LAB
ANION GAP SERPL CALCULATED.3IONS-SCNC: 12 MMOL/L (ref 4–13)
BUN SERPL-MCNC: 24 MG/DL (ref 5–25)
CALCIUM SERPL-MCNC: 8.8 MG/DL (ref 8.3–10.1)
CHLORIDE SERPL-SCNC: 105 MMOL/L (ref 100–108)
CO2 SERPL-SCNC: 23 MMOL/L (ref 21–32)
CREAT SERPL-MCNC: 1.17 MG/DL (ref 0.6–1.3)
ERYTHROCYTE [DISTWIDTH] IN BLOOD BY AUTOMATED COUNT: 13.9 % (ref 11.6–15.1)
GFR SERPL CREATININE-BSD FRML MDRD: 43 ML/MIN/1.73SQ M
GLUCOSE SERPL-MCNC: 117 MG/DL (ref 65–140)
HCT VFR BLD AUTO: 41.6 % (ref 34.8–46.1)
HGB BLD-MCNC: 13.6 G/DL (ref 11.5–15.4)
MAGNESIUM SERPL-MCNC: 2.1 MG/DL (ref 1.6–2.6)
MCH RBC QN AUTO: 32.3 PG (ref 26.8–34.3)
MCHC RBC AUTO-ENTMCNC: 32.7 G/DL (ref 31.4–37.4)
MCV RBC AUTO: 99 FL (ref 82–98)
MRSA NOSE QL CULT: NORMAL
PHOSPHATE SERPL-MCNC: 3.4 MG/DL (ref 2.3–4.1)
PLATELET # BLD AUTO: 246 THOUSANDS/UL (ref 149–390)
PMV BLD AUTO: 11.6 FL (ref 8.9–12.7)
POTASSIUM SERPL-SCNC: 3.4 MMOL/L (ref 3.5–5.3)
RBC # BLD AUTO: 4.21 MILLION/UL (ref 3.81–5.12)
SODIUM SERPL-SCNC: 140 MMOL/L (ref 136–145)
WBC # BLD AUTO: 9.94 THOUSAND/UL (ref 4.31–10.16)

## 2019-05-12 PROCEDURE — 80048 BASIC METABOLIC PNL TOTAL CA: CPT | Performed by: GENERAL PRACTICE

## 2019-05-12 PROCEDURE — 85027 COMPLETE CBC AUTOMATED: CPT | Performed by: GENERAL PRACTICE

## 2019-05-12 PROCEDURE — NC001 PR NO CHARGE: Performed by: FAMILY MEDICINE

## 2019-05-12 PROCEDURE — 84100 ASSAY OF PHOSPHORUS: CPT | Performed by: GENERAL PRACTICE

## 2019-05-12 PROCEDURE — 83735 ASSAY OF MAGNESIUM: CPT | Performed by: GENERAL PRACTICE

## 2019-05-12 RX ORDER — NICOTINE 21 MG/24HR
1 PATCH, TRANSDERMAL 24 HOURS TRANSDERMAL DAILY
Qty: 28 PATCH | Refills: 0 | Status: SHIPPED | OUTPATIENT
Start: 2019-05-13 | End: 2020-01-22 | Stop reason: ALTCHOICE

## 2019-05-12 RX ORDER — NEBIVOLOL 5 MG/1
5 TABLET ORAL DAILY
Qty: 30 TABLET | Refills: 1 | Status: SHIPPED | OUTPATIENT
Start: 2019-05-12 | End: 2019-05-12 | Stop reason: HOSPADM

## 2019-05-12 RX ADMIN — ATORVASTATIN CALCIUM 80 MG: 80 TABLET ORAL at 09:20

## 2019-05-12 RX ADMIN — ENOXAPARIN SODIUM 40 MG: 40 INJECTION SUBCUTANEOUS at 09:20

## 2019-05-12 RX ADMIN — MEMANTINE 10 MG: 10 TABLET ORAL at 09:20

## 2019-05-12 RX ADMIN — NICOTINE 1 PATCH: 14 PATCH TRANSDERMAL at 09:20

## 2019-05-12 RX ADMIN — ASPIRIN 81 MG 81 MG: 81 TABLET ORAL at 09:20

## 2019-05-12 RX ADMIN — FLUTICASONE PROPIONATE 2 SPRAY: 50 SPRAY, METERED NASAL at 11:17

## 2019-05-12 RX ADMIN — FAMOTIDINE 20 MG: 20 TABLET ORAL at 09:20

## 2019-05-13 ENCOUNTER — TRANSITIONAL CARE MANAGEMENT (OUTPATIENT)
Dept: FAMILY MEDICINE CLINIC | Facility: CLINIC | Age: 84
End: 2019-05-13

## 2019-05-14 LAB
ATRIAL RATE: 58 BPM
P AXIS: 66 DEGREES
PR INTERVAL: 200 MS
QRS AXIS: -17 DEGREES
QRSD INTERVAL: 82 MS
QT INTERVAL: 464 MS
QTC INTERVAL: 455 MS
T WAVE AXIS: 62 DEGREES
VENTRICULAR RATE: 58 BPM

## 2019-05-14 PROCEDURE — 93010 ELECTROCARDIOGRAM REPORT: CPT | Performed by: INTERNAL MEDICINE

## 2019-05-15 ENCOUNTER — TELEPHONE (OUTPATIENT)
Dept: FAMILY MEDICINE CLINIC | Facility: CLINIC | Age: 84
End: 2019-05-15

## 2019-05-16 DIAGNOSIS — B35.1 ONYCHOMYCOSIS: ICD-10-CM

## 2019-05-17 RX ORDER — TERBINAFINE HYDROCHLORIDE 250 MG/1
TABLET ORAL
Qty: 90 TABLET | Refills: 1 | Status: SHIPPED | OUTPATIENT
Start: 2019-05-17 | End: 2019-08-15

## 2019-05-19 ENCOUNTER — TELEPHONE (OUTPATIENT)
Dept: OTHER | Facility: OTHER | Age: 84
End: 2019-05-19

## 2019-05-20 ENCOUNTER — OFFICE VISIT (OUTPATIENT)
Dept: FAMILY MEDICINE CLINIC | Facility: CLINIC | Age: 84
End: 2019-05-20
Payer: MEDICARE

## 2019-05-20 VITALS
HEIGHT: 61 IN | TEMPERATURE: 96.9 F | RESPIRATION RATE: 18 BRPM | BODY MASS INDEX: 29.15 KG/M2 | HEART RATE: 74 BPM | DIASTOLIC BLOOD PRESSURE: 70 MMHG | SYSTOLIC BLOOD PRESSURE: 132 MMHG | WEIGHT: 154.4 LBS | OXYGEN SATURATION: 95 %

## 2019-05-20 DIAGNOSIS — Z09 HOSPITAL DISCHARGE FOLLOW-UP: Primary | ICD-10-CM

## 2019-05-20 DIAGNOSIS — R29.6 FALLS FREQUENTLY: ICD-10-CM

## 2019-05-20 DIAGNOSIS — M54.32 SCIATIC PAIN, LEFT: ICD-10-CM

## 2019-05-20 DIAGNOSIS — I10 BENIGN ESSENTIAL HYPERTENSION: ICD-10-CM

## 2019-05-20 DIAGNOSIS — R00.1 SINUS BRADYCARDIA: ICD-10-CM

## 2019-05-20 PROCEDURE — 99495 TRANSJ CARE MGMT MOD F2F 14D: CPT | Performed by: FAMILY MEDICINE

## 2019-05-23 ENCOUNTER — TELEPHONE (OUTPATIENT)
Dept: FAMILY MEDICINE CLINIC | Facility: CLINIC | Age: 84
End: 2019-05-23

## 2019-05-25 ENCOUNTER — TELEPHONE (OUTPATIENT)
Dept: OTHER | Facility: OTHER | Age: 84
End: 2019-05-25

## 2019-05-26 ENCOUNTER — OFFICE VISIT (OUTPATIENT)
Dept: URGENT CARE | Facility: CLINIC | Age: 84
End: 2019-05-26
Payer: MEDICARE

## 2019-05-26 ENCOUNTER — APPOINTMENT (EMERGENCY)
Dept: RADIOLOGY | Facility: HOSPITAL | Age: 84
End: 2019-05-26
Payer: MEDICARE

## 2019-05-26 ENCOUNTER — HOSPITAL ENCOUNTER (EMERGENCY)
Facility: HOSPITAL | Age: 84
Discharge: HOME | End: 2019-05-26
Attending: EMERGENCY MEDICINE | Admitting: EMERGENCY MEDICINE
Payer: MEDICARE

## 2019-05-26 VITALS
TEMPERATURE: 98.3 F | WEIGHT: 143 LBS | OXYGEN SATURATION: 98 % | HEIGHT: 62 IN | SYSTOLIC BLOOD PRESSURE: 118 MMHG | DIASTOLIC BLOOD PRESSURE: 62 MMHG | RESPIRATION RATE: 16 BRPM | BODY MASS INDEX: 26.31 KG/M2 | HEART RATE: 72 BPM

## 2019-05-26 VITALS
RESPIRATION RATE: 20 BRPM | DIASTOLIC BLOOD PRESSURE: 90 MMHG | HEART RATE: 76 BPM | TEMPERATURE: 97.6 F | WEIGHT: 154 LBS | SYSTOLIC BLOOD PRESSURE: 170 MMHG | BODY MASS INDEX: 28.17 KG/M2 | OXYGEN SATURATION: 95 %

## 2019-05-26 DIAGNOSIS — N39.0 UTI (URINARY TRACT INFECTION): ICD-10-CM
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
## 2019-05-26 DIAGNOSIS — N17.9 AKI (ACUTE KIDNEY INJURY) (HCC): ICD-10-CM
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
## 2019-05-26 LAB
ALBUMIN SERPL BCP-MCNC: 3 G/DL (ref 3.5–5)
ALP SERPL-CCNC: 88 U/L (ref 46–116)
ALT SERPL W P-5'-P-CCNC: 21 U/L (ref 12–78)
ANION GAP SERPL CALCULATED.3IONS-SCNC: 5 MMOL/L (ref 4–13)
APTT PPP: 26 SECONDS (ref 26–38)
AST SERPL W P-5'-P-CCNC: 18 U/L (ref 5–45)
BACTERIA UR QL AUTO: ABNORMAL /HPF
BASOPHILS # BLD AUTO: 0.04 THOUSANDS/ΜL (ref 0–0.1)
BASOPHILS NFR BLD AUTO: 1 % (ref 0–1)
BILIRUB SERPL-MCNC: 0.3 MG/DL (ref 0.2–1)
BILIRUB UR QL STRIP: NEGATIVE
BUN SERPL-MCNC: 24 MG/DL (ref 5–25)
CALCIUM SERPL-MCNC: 7.9 MG/DL (ref 8.3–10.1)
CHLORIDE SERPL-SCNC: 107 MMOL/L (ref 100–108)
CLARITY UR: CLEAR
CO2 SERPL-SCNC: 30 MMOL/L (ref 21–32)
COLOR UR: YELLOW
CREAT SERPL-MCNC: 1.48 MG/DL (ref 0.6–1.3)
EOSINOPHIL # BLD AUTO: 0.14 THOUSAND/ΜL (ref 0–0.61)
EOSINOPHIL NFR BLD AUTO: 2 % (ref 0–6)
ERYTHROCYTE [DISTWIDTH] IN BLOOD BY AUTOMATED COUNT: 13.2 % (ref 11.6–15.1)
GFR SERPL CREATININE-BSD FRML MDRD: 33 ML/MIN/1.73SQ M
GLUCOSE SERPL-MCNC: 131 MG/DL (ref 65–140)
GLUCOSE UR STRIP-MCNC: NEGATIVE MG/DL
HCT VFR BLD AUTO: 41.4 % (ref 34.8–46.1)
HGB BLD-MCNC: 13.3 G/DL (ref 11.5–15.4)
HGB UR QL STRIP.AUTO: NEGATIVE
HYALINE CASTS #/AREA URNS LPF: ABNORMAL /LPF
IMM GRANULOCYTES # BLD AUTO: 0.03 THOUSAND/UL (ref 0–0.2)
IMM GRANULOCYTES NFR BLD AUTO: 0 % (ref 0–2)
INR PPP: 0.99 (ref 0.86–1.16)
KETONES UR STRIP-MCNC: ABNORMAL MG/DL
LEUKOCYTE ESTERASE UR QL STRIP: NEGATIVE
LIPASE SERPL-CCNC: 144 U/L (ref 73–393)
LYMPHOCYTES # BLD AUTO: 1.52 THOUSANDS/ΜL (ref 0.6–4.47)
LYMPHOCYTES NFR BLD AUTO: 20 % (ref 14–44)
MAGNESIUM SERPL-MCNC: 2.1 MG/DL (ref 1.6–2.6)
MCH RBC QN AUTO: 32.1 PG (ref 26.8–34.3)
MCHC RBC AUTO-ENTMCNC: 32.1 G/DL (ref 31.4–37.4)
MCV RBC AUTO: 100 FL (ref 82–98)
MONOCYTES # BLD AUTO: 0.74 THOUSAND/ΜL (ref 0.17–1.22)
MONOCYTES NFR BLD AUTO: 10 % (ref 4–12)
MUCOUS THREADS UR QL AUTO: ABNORMAL
NEUTROPHILS # BLD AUTO: 5.21 THOUSANDS/ΜL (ref 1.85–7.62)
NEUTS SEG NFR BLD AUTO: 67 % (ref 43–75)
NITRITE UR QL STRIP: NEGATIVE
NON-SQ EPI CELLS URNS QL MICRO: ABNORMAL /HPF
NRBC BLD AUTO-RTO: 0 /100 WBCS
PH UR STRIP.AUTO: 6 [PH]
PLATELET # BLD AUTO: 209 THOUSANDS/UL (ref 149–390)
PMV BLD AUTO: 9.8 FL (ref 8.9–12.7)
POTASSIUM SERPL-SCNC: 3.7 MMOL/L (ref 3.5–5.3)
PROT SERPL-MCNC: 6.4 G/DL (ref 6.4–8.2)
PROT UR STRIP-MCNC: ABNORMAL MG/DL
PROTHROMBIN TIME: 10.4 SECONDS (ref 9.4–11.7)
RBC # BLD AUTO: 4.14 MILLION/UL (ref 3.81–5.12)
RBC #/AREA URNS AUTO: ABNORMAL /HPF
SODIUM SERPL-SCNC: 142 MMOL/L (ref 136–145)
SP GR UR STRIP.AUTO: 1.02 (ref 1–1.03)
TROPONIN I SERPL-MCNC: 0.03 NG/ML
TSH SERPL DL<=0.05 MIU/L-ACNC: 1.36 UIU/ML (ref 0.36–3.74)
UROBILINOGEN UR QL STRIP.AUTO: 0.2 E.U./DL
WBC # BLD AUTO: 7.68 THOUSAND/UL (ref 4.31–10.16)
WBC #/AREA URNS AUTO: ABNORMAL /HPF

## 2019-05-26 PROCEDURE — 83735 ASSAY OF MAGNESIUM: CPT | Performed by: EMERGENCY MEDICINE

## 2019-05-26 PROCEDURE — 84443 ASSAY THYROID STIM HORMONE: CPT | Performed by: EMERGENCY MEDICINE

## 2019-05-26 PROCEDURE — 81001 URINALYSIS AUTO W/SCOPE: CPT | Performed by: EMERGENCY MEDICINE

## 2019-05-26 PROCEDURE — 85730 THROMBOPLASTIN TIME PARTIAL: CPT | Performed by: EMERGENCY MEDICINE

## 2019-05-26 PROCEDURE — 71045 X-RAY EXAM CHEST 1 VIEW: CPT

## 2019-05-26 PROCEDURE — 93005 ELECTROCARDIOGRAM TRACING: CPT

## 2019-05-26 PROCEDURE — 36415 COLL VENOUS BLD VENIPUNCTURE: CPT | Performed by: EMERGENCY MEDICINE

## 2019-05-26 PROCEDURE — 85025 COMPLETE CBC W/AUTO DIFF WBC: CPT | Performed by: EMERGENCY MEDICINE

## 2019-05-26 PROCEDURE — 72125 CT NECK SPINE W/O DYE: CPT

## 2019-05-26 PROCEDURE — 99284 EMERGENCY DEPT VISIT MOD MDM: CPT

## 2019-05-26 PROCEDURE — 80053 COMPREHEN METABOLIC PANEL: CPT | Performed by: EMERGENCY MEDICINE

## 2019-05-26 PROCEDURE — 83690 ASSAY OF LIPASE: CPT | Performed by: EMERGENCY MEDICINE

## 2019-05-26 PROCEDURE — 84484 ASSAY OF TROPONIN QUANT: CPT | Performed by: EMERGENCY MEDICINE

## 2019-05-26 PROCEDURE — 99213 OFFICE O/P EST LOW 20 MIN: CPT | Performed by: PHYSICIAN ASSISTANT

## 2019-05-26 PROCEDURE — 70450 CT HEAD/BRAIN W/O DYE: CPT

## 2019-05-26 PROCEDURE — 85610 PROTHROMBIN TIME: CPT | Performed by: EMERGENCY MEDICINE

## 2019-05-26 RX ORDER — ACETAMINOPHEN 325 MG/1
650 TABLET ORAL ONCE
Status: COMPLETED | OUTPATIENT
Start: 2019-05-26 | End: 2019-05-26

## 2019-05-26 RX ORDER — CEPHALEXIN 500 MG/1
500 CAPSULE ORAL ONCE
Status: COMPLETED | OUTPATIENT
Start: 2019-05-26 | End: 2019-05-26

## 2019-05-26 RX ORDER — CEPHALEXIN 500 MG/1
500 CAPSULE ORAL 2 TIMES DAILY
Qty: 6 CAPSULE | Refills: 0 | Status: SHIPPED | OUTPATIENT
Start: 2019-05-26 | End: 2019-05-29

## 2019-05-26 RX ADMIN — ACETAMINOPHEN 650 MG: 325 TABLET, FILM COATED ORAL at 17:29

## 2019-05-26 RX ADMIN — SODIUM CHLORIDE 500 ML: 0.9 INJECTION, SOLUTION INTRAVENOUS at 17:00

## 2019-05-26 RX ADMIN — CEPHALEXIN 500 MG: 500 CAPSULE ORAL at 18:25

## 2019-05-28 LAB
ATRIAL RATE: 74 BPM
P AXIS: 66 DEGREES
PR INTERVAL: 202 MS
QRS AXIS: -17 DEGREES
QRSD INTERVAL: 72 MS
QT INTERVAL: 416 MS
QTC INTERVAL: 461 MS
T WAVE AXIS: 49 DEGREES
VENTRICULAR RATE: 74 BPM

## 2019-05-28 PROCEDURE — 93010 ELECTROCARDIOGRAM REPORT: CPT | Performed by: INTERNAL MEDICINE

## 2019-05-29 ENCOUNTER — OFFICE VISIT (OUTPATIENT)
Dept: FAMILY MEDICINE CLINIC | Facility: CLINIC | Age: 84
End: 2019-05-29
Payer: MEDICARE

## 2019-05-29 VITALS
HEART RATE: 88 BPM | DIASTOLIC BLOOD PRESSURE: 70 MMHG | WEIGHT: 154 LBS | OXYGEN SATURATION: 96 % | SYSTOLIC BLOOD PRESSURE: 120 MMHG | RESPIRATION RATE: 18 BRPM | BODY MASS INDEX: 28.17 KG/M2

## 2019-05-29 DIAGNOSIS — I10 BENIGN ESSENTIAL HYPERTENSION: ICD-10-CM

## 2019-05-29 DIAGNOSIS — R29.6 RECURRENT FALLS: Primary | ICD-10-CM

## 2019-05-29 DIAGNOSIS — G30.9 ALZHEIMER'S DEMENTIA WITHOUT BEHAVIORAL DISTURBANCE, UNSPECIFIED TIMING OF DEMENTIA ONSET (HCC): ICD-10-CM

## 2019-05-29 DIAGNOSIS — R35.0 URINARY FREQUENCY: ICD-10-CM

## 2019-05-29 DIAGNOSIS — F02.80 ALZHEIMER'S DEMENTIA WITHOUT BEHAVIORAL DISTURBANCE, UNSPECIFIED TIMING OF DEMENTIA ONSET (HCC): ICD-10-CM

## 2019-05-29 DIAGNOSIS — Z71.6 ENCOUNTER FOR SMOKING CESSATION COUNSELING: ICD-10-CM

## 2019-05-29 PROCEDURE — 99214 OFFICE O/P EST MOD 30 MIN: CPT | Performed by: FAMILY MEDICINE

## 2019-05-29 RX ORDER — NEBIVOLOL HYDROCHLORIDE 10 MG/1
5 TABLET ORAL DAILY
Status: ON HOLD | COMMUNITY
Start: 2019-05-25 | End: 2020-01-24

## 2019-05-30 ENCOUNTER — TELEPHONE (OUTPATIENT)
Dept: FAMILY MEDICINE CLINIC | Facility: CLINIC | Age: 84
End: 2019-05-30

## 2019-06-05 ENCOUNTER — EVALUATION (OUTPATIENT)
Dept: PHYSICAL THERAPY | Facility: CLINIC | Age: 84
End: 2019-06-05
Payer: MEDICARE

## 2019-06-05 DIAGNOSIS — R29.6 RECURRENT FALLS: Primary | ICD-10-CM

## 2019-06-05 PROCEDURE — 97116 GAIT TRAINING THERAPY: CPT

## 2019-06-05 PROCEDURE — G8979 MOBILITY GOAL STATUS: HCPCS

## 2019-06-05 PROCEDURE — G8978 MOBILITY CURRENT STATUS: HCPCS

## 2019-06-05 PROCEDURE — 97163 PT EVAL HIGH COMPLEX 45 MIN: CPT

## 2019-06-11 ENCOUNTER — OFFICE VISIT (OUTPATIENT)
Dept: PHYSICAL THERAPY | Facility: CLINIC | Age: 84
End: 2019-06-11
Payer: MEDICARE

## 2019-06-11 DIAGNOSIS — R29.6 RECURRENT FALLS: Primary | ICD-10-CM

## 2019-06-11 PROCEDURE — 97112 NEUROMUSCULAR REEDUCATION: CPT

## 2019-06-11 PROCEDURE — 97110 THERAPEUTIC EXERCISES: CPT

## 2019-06-13 ENCOUNTER — OFFICE VISIT (OUTPATIENT)
Dept: PHYSICAL THERAPY | Facility: CLINIC | Age: 84
End: 2019-06-13
Payer: MEDICARE

## 2019-06-13 DIAGNOSIS — R29.6 RECURRENT FALLS: Primary | ICD-10-CM

## 2019-06-13 PROCEDURE — 97110 THERAPEUTIC EXERCISES: CPT | Performed by: PHYSICAL THERAPIST

## 2019-06-13 PROCEDURE — 97112 NEUROMUSCULAR REEDUCATION: CPT | Performed by: PHYSICAL THERAPIST

## 2019-06-18 ENCOUNTER — OFFICE VISIT (OUTPATIENT)
Dept: PHYSICAL THERAPY | Facility: CLINIC | Age: 84
End: 2019-06-18
Payer: MEDICARE

## 2019-06-18 DIAGNOSIS — R29.6 RECURRENT FALLS: Primary | ICD-10-CM

## 2019-06-18 PROCEDURE — 97530 THERAPEUTIC ACTIVITIES: CPT

## 2019-06-18 PROCEDURE — 97112 NEUROMUSCULAR REEDUCATION: CPT

## 2019-06-20 ENCOUNTER — OFFICE VISIT (OUTPATIENT)
Dept: PHYSICAL THERAPY | Facility: CLINIC | Age: 84
End: 2019-06-20
Payer: MEDICARE

## 2019-06-20 DIAGNOSIS — R29.6 RECURRENT FALLS: Primary | ICD-10-CM

## 2019-06-20 PROCEDURE — 97530 THERAPEUTIC ACTIVITIES: CPT

## 2019-06-20 PROCEDURE — 97112 NEUROMUSCULAR REEDUCATION: CPT

## 2019-06-24 ENCOUNTER — OFFICE VISIT (OUTPATIENT)
Dept: PHYSICAL THERAPY | Facility: CLINIC | Age: 84
End: 2019-06-24
Payer: MEDICARE

## 2019-06-24 DIAGNOSIS — R29.6 RECURRENT FALLS: Primary | ICD-10-CM

## 2019-06-24 PROCEDURE — 97112 NEUROMUSCULAR REEDUCATION: CPT

## 2019-06-24 PROCEDURE — 97110 THERAPEUTIC EXERCISES: CPT

## 2019-06-27 ENCOUNTER — OFFICE VISIT (OUTPATIENT)
Dept: PHYSICAL THERAPY | Facility: CLINIC | Age: 84
End: 2019-06-27
Payer: MEDICARE

## 2019-06-27 DIAGNOSIS — R29.6 RECURRENT FALLS: Primary | ICD-10-CM

## 2019-06-27 PROCEDURE — 97112 NEUROMUSCULAR REEDUCATION: CPT

## 2019-07-01 ENCOUNTER — OFFICE VISIT (OUTPATIENT)
Dept: PHYSICAL THERAPY | Facility: CLINIC | Age: 84
End: 2019-07-01
Payer: MEDICARE

## 2019-07-01 DIAGNOSIS — R29.6 RECURRENT FALLS: Primary | ICD-10-CM

## 2019-07-01 PROCEDURE — 97112 NEUROMUSCULAR REEDUCATION: CPT

## 2019-07-01 NOTE — PROGRESS NOTES
Daily Note     Today's date: 2019  Patient name: Aurelia Whiting  : 1935  MRN: [id number]  Referring provider: Arlana Jeans, DO  Dx:   Encounter Diagnosis     ICD-10-CM    1  Recurrent falls R29 6        Start Time: 5854(8698XL)  Stop Time: 1300  Total time in clinic (min): 34 minutes    Subjective: Pt reports L hip pain from prior fall  Son denies any more falls since last visit  Objective: See treatment diary below   has a past medical history of Alzheimer disease, Arthritis, Dementia, Encounter for screening for osteoporosis, HL (hearing loss), Hyperlipidemia, Hypertension, and Sciatica  Treatment Diary 19: NV add weighted vest    Nustep L3 9'  Manual HS stretch 30 sec x 3 bilat   Manual SKTC 30 sec x 3 bilat   Obstacle course with supervision/cueing: x 3 cycles   -2 trac    - 2 hurdles 6"   - 2 foam pads   -color dots for "giant steps"   -3 cones for weaving   - side stepping between 2 pads  -Alt toe taps 2 5# ankle wts  1 UE support constanct TC's/VC's x 30  NOT PERFORMED:  -AP wt shift in AP stance with bowling arm swing 2# wrist  Contact guard and cueing    -bowling 3 pins, foam ball for 1 UE   - catch a volleball, FA center, left, right 12 reps each LOB=0    -light resistance resisted walking, fwd, Lat R/L, Rev to place cones on seat x 8 @ way  -HS curls starting with toe on BOSU behind pt   -gait fwd over hurdles with 2 5# each ankle x 10 reps  0-1 UE support  -FOAM FA with head turns during conversation 60 sec LOB=1  -FOAM FA lateral wt shift with manual contacts  Stairs w/one railing x 6 reps alternating gait pattern  Amb while carrying 10# ball  Amb while carrying cup of water throughout gym , up/down curb step  Step ups lateral with 2 UE support  Poor foot placement  Step taps 2 UE support  Pt had significant difficulty following demonstration  Agility ladder: fwd steps, reduced step length noted       Attempted lateral steps, pt turned fwd  PWR seated Twist, Trunk rotation trialed with Boomwhackers  -Hip Flexion- 2 UE support, on foam, 20 reps  -Hip Abduction - 2 UE support, on foam, 20 reps  -Hip Extension- 2 UE support, on foam, 20 reps   -Agility ladder- two feet between each rung- 2 UE support, colored discs to assist with stepping, 10 cycles of 10 feet  -Stepping to colored discs for proper base of support- 10 cycles of 10 feet  -Step Taps- 30 reps forward, 8" step        Assessment: Displays reduced ability to weight shift during functional activity  Improved alternating toe taps today, with constant TC's and VC's  Pt tolerated treatment well , she followed most simple commands, sometimes requiring demonstration or mirroring  Pt resistant to large steps  Pt would benefit from continued PT for further balance training to reduce falls  Plan: Continue per plan of care  Trial of hurdles, weighted vest, progress functional exercise as tolerated

## 2019-07-03 ENCOUNTER — OFFICE VISIT (OUTPATIENT)
Dept: PHYSICAL THERAPY | Facility: CLINIC | Age: 84
End: 2019-07-03
Payer: MEDICARE

## 2019-07-03 DIAGNOSIS — R29.6 RECURRENT FALLS: Primary | ICD-10-CM

## 2019-07-03 PROCEDURE — G8979 MOBILITY GOAL STATUS: HCPCS

## 2019-07-03 PROCEDURE — 97112 NEUROMUSCULAR REEDUCATION: CPT

## 2019-07-03 PROCEDURE — G8978 MOBILITY CURRENT STATUS: HCPCS

## 2019-07-03 PROCEDURE — 97110 THERAPEUTIC EXERCISES: CPT

## 2019-07-03 PROCEDURE — 97116 GAIT TRAINING THERAPY: CPT

## 2019-07-03 NOTE — PROGRESS NOTES
PT Evaluation     Today's date: 7/3/2019  Patient name: Shelli Jones  : 1935  MRN: [id number]  Referring provider: Kiera Marquis DO  Dx:   Encounter Diagnosis     ICD-10-CM    1  Recurrent falls R29 6        Start Time: 1125  Stop Time: 1230  Total time in clinic (min): 65 minutes    Assessment  Assessment details: Shelli Jones is a 81year old female who demonstrates improved objective measures over the past month (M[place]ullough,5XSTS, and gait speed ) Pt has trouble following simple commands but improves with repetition  She continues to demonstrate reduced step height and step length which reduce her safety  She occasionally displays poor foot placement and ineffective balance responses  Stepping over objects taller than 4" remains challenging for her  SPC is recommended for ambulation (pt/family now agreeable ) Focus of therapy going forward will be increasing her step height and gaining independence with SPC  Pt continues to need encouragement/supervision to reduce her sitting time at home  Advise continued PT per POC  Impairments: activity intolerance, impaired balance, impaired physical strength, lacks appropriate home exercise program and safety issue  Understanding of Dx/Px/POC: good   Prognosis: good    Goals    STG 2-4 weeks  1  Pt will demonstrate safe, independent use of AD for ambulation on  level surfaces, curbs, stairs and perturb surfaces  2  Pt will be independent in early HEP  MET with family   3  Pt will independently rise from sitting every 15-20 minutes at home, and be active for 10-15 minutes  7/3/19 Has not yet complied     LTG 4-8 weeks  1  Pt will score at a safe level on standardized testing for dynamic/community mobility  [employer]) PARTIALLY MET  2  Pt will improve narrow-based balance with eyes closed to 15 seconds seconds for improved safety with ADL's in low lighting  3  Pt will be independent in advanced HEP      4  Improve 5XSTS score to 20 seconds to improve speed of transfers  PARTIALLY MET   5  Improve TUG score to 15  seconds to improve speed of transfers and mobility  6  Improve gait speed to 0 78 M/sec during 10M Walk Test to improve speed and safety of mobility   (age norm) PARTIALLY MET  7  Pt will participate in a discussion about Wellness or [place] attendance, vs Chair Yoga or Balance Class after DC from PT  Plan  Planned therapy interventions: manual therapy, patient education, postural training, stretching, strengthening, therapeutic activities, therapeutic exercise, home exercise program, neuromuscular re-education, balance and coordination  Frequency: 2x week  Duration in weeks: 8  Plan of Care beginning date: 2019  Plan of Care expiration date: 2019  Treatment plan discussed with: family and patient        Subjective Evaluation    History of Present Illness  Mechanism of injury: 81year old female had a [place] yesterday at the park, tripped over a little metal lip  She needed 3 people to help her up  She continues to fall almost once weekly  She has attended PT for the past month  Son states pt does not enjoy PT and he does not feel she is improving  Son states it is harder for her to lift her left leg while standing/holding on     After reassessment, objective improvement noted, son is agreeable to continue Andres's PT, with focus on simple repetitive tasks that will help her lift her feet higher  Pain  Current pain ratin  At best pain ratin  At worst pain ratin  Location: left sciatic   Quality: sharp  Relieving factors: rest    Social Support  Steps to enter house: yes  Lives in: multiple-level home (pt living on one level  )  Lives with: adult children (aide comes in 2 hrs in am, 1 hr at night mon-sat)    Patient Goals  Patient goals for therapy: improved balance and increased strength  Patient goal: prevent falls, more secure while walking           Objective     Functional Assessment        Comments  Coordination:   Finger to nose: unable  PRONATOR DRIFT: negative   Heel to shin:  Unable to perform  DERECK:  UESlow, incomplete supination bilat      LE rhythm deteriorates as speed increases  Strength:  Hip flexion:  R=3L=unable due to sciatic pain  Quads:  R = 3+   L = 3  Ankles: R=4+, EV 5/5  L=5  Oculomotor:  SP:intact, slow  Saccades:slow, needs much cueing  SLS: L = 3 sec, R = 2 sec  Gait speed:16 46 sec over 10 M = 0 60 m/sec or  1 99 ft/sec No AD  ANDERS/56 high fall risk  TU 50 sec   5XSTS:36 18 seconds, no UE, much cueing needed  Positionals not tested  Pt was trialed with SPC on level surfaces with good results  Rollator was also suggested is pt/family are interested in community outings (she is limited by incontinence per family  )       UPDATED 7/3/19:  ANDERS:48/56  TU seconds, no UE  21 34 sec 2nd rep  5XSTS:20 18 seconds no UE  Gait speed:  15 15 sec over 10 M 0 66 M/sec    Continues to   Balance responses remain ineffective and supervision should be continued  SPC: pt needs supervison and minor cueing for gait on level surfaces, but demonstrates increase in step height when SPC used  Flowsheet Rows      Most Recent Value   PT/OT G-Codes   Current Score  54   Projected Score  61   FOTO information reviewed  Yes   Assessment Type  Re-evaluation   G code set  Mobility: Walking & Moving Around   Mobility: Walking and Moving Around Current Status ()  CK   Mobility: Walking and Moving Around Goal Status ()  CJ          Precautions:  has a past medical history of Alzheimer disease, Arthritis, Dementia, Encounter for screening for osteoporosis, HL (hearing loss), Hyperlipidemia, Hypertension, and Sciatica  Daily Treatment Diary     Treatment Diary 7/3/19: 8# weighted vest  And 3# ankle weights worn t/o session  STS  10 reps  3# ankle weights and vest, stair climbing  steps x 8 cycles     3# ankle weights step over 2 trac x2 for 8 reps  (6" hurdles too challenging)   Alt toe taps 14" height (side of  steps) 2 UE support x 20 reps          NOT PERFORMED:  Nustep L3 9'  Manual HS stretch 30 sec x 3 bilat   Manual SKTC 30 sec x 3 bilat   Obstacle course with supervision/cueing: x 3 cycles   -2 trac    - 2 hurdles 6"   - 2 foam pads   -color dots for "giant steps"   -3 cones for weaving   - side stepping between 2 pads

## 2019-07-09 ENCOUNTER — OFFICE VISIT (OUTPATIENT)
Dept: PHYSICAL THERAPY | Facility: CLINIC | Age: 84
End: 2019-07-09
Payer: MEDICARE

## 2019-07-09 DIAGNOSIS — R29.6 RECURRENT FALLS: Primary | ICD-10-CM

## 2019-07-09 PROCEDURE — 97112 NEUROMUSCULAR REEDUCATION: CPT

## 2019-07-09 PROCEDURE — 97110 THERAPEUTIC EXERCISES: CPT

## 2019-07-09 NOTE — PROGRESS NOTES
Daily Note     Today's date: 2019  Patient name: Jaden Alejandra  : 1935  MRN: [id number]  Referring provider: Alf Byers DO  Dx:   Encounter Diagnosis     ICD-10-CM    1  Recurrent falls R29 6        Start Time: 1120  Stop Time: 1200  Total time in clinic (min): 40 minutes    Subjective: Pt reports L hip pain from prior fall  Son denies any more falls since last visit  Objective: See treatment diary below   has a past medical history of Alzheimer disease, Arthritis, Dementia, Encounter for screening for osteoporosis, HL (hearing loss), Hyperlipidemia, Hypertension, and Sciatica  Treatment Diary 19: 8# weighted vest  And 3# ankle weights worn t/o session  STS  15  reps 8# vest, no UE   3# ankle weights and vest, stair climbing  steps x 8 cycles 1 UE  3# ankle weights step over 2 trac x2 for 10 reps  1 foot over/back 1 hand hold    Alt toe taps 3# ankle weights to 14" height (side of  steps) 1 UE support x 12 reps bilat   no weights/no vest at end of session over 2- 6" hurdles no UE x 4 reps   Nustep L4 10'    NOT PERFORMED:    Manual HS stretch 30 sec x 3 bilat   Manual SKTC 30 sec x 3 bilat   Obstacle course with supervision/cueing: x 3 cycles   -2 trac    - 2 hurdles 6"   - 2 foam pads   -color dots for "giant steps"   -3 cones for weaving   - side stepping between 2 pads          Assessment: Displays improving ability to weight shift during functional activity  Improved alternating toe taps today with one less UE support, with constant TC's and VC's and demonstration  Pt tolerated treatment well , she followed most simple commands, sometimes requiring demonstration or mirroring  Pt resistant to large steps  Pt would benefit from continued PT for further balance training to reduce falls  Plan: Continue per plan of care  Progress simple/functional exercise as tolerated

## 2019-07-12 ENCOUNTER — OFFICE VISIT (OUTPATIENT)
Dept: PHYSICAL THERAPY | Facility: CLINIC | Age: 84
End: 2019-07-12
Payer: MEDICARE

## 2019-07-12 DIAGNOSIS — R29.6 RECURRENT FALLS: Primary | ICD-10-CM

## 2019-07-12 PROCEDURE — 97112 NEUROMUSCULAR REEDUCATION: CPT | Performed by: PHYSICAL THERAPIST

## 2019-07-12 PROCEDURE — 97110 THERAPEUTIC EXERCISES: CPT | Performed by: PHYSICAL THERAPIST

## 2019-07-12 NOTE — PROGRESS NOTES
Daily Note     Today's date: 2019  Patient name: Zoila Leung  : 1935  MRN: [id number]  Referring provider: Domenic Thornton DO  Dx:   Encounter Diagnosis     ICD-10-CM    1  Recurrent falls R29 6                   Subjective: Pt reports L hip pain from prior fall  Son denies any more falls since last visit  Objective: See treatment diary below   has a past medical history of Alzheimer disease, Arthritis, Dementia, Encounter for screening for osteoporosis, HL (hearing loss), Hyperlipidemia, Hypertension, and Sciatica  Treatment Diary 19: 8# weighted vest  And 3# ankle weights worn t/o session  STS  15  reps 8# vest, no UE with high fives with cue to hit my hands   3# ankle weights and vest, stair climbing  steps x 8 cycles 1 UE  3# ankle weights step over 2 trac x2 for 10 reps  Alt toe taps 3# ankle weights to 12" height (side of  steps) 1 UE support x 12 reps bilat   Over 2- yoga blocks  1 UE x 4 reps   Nustep L4 10'        Assessment:   Max cuing for directions and desired movement during therapy session  Improvement in foot clearance with yoga blocks instead of hurdles with weights  No loss of balance noted during session  Had tendency to hit yoga block with trailing leg  Pt would benefit from continued PT for further balance training to reduce falls  Plan: Continue per plan of care  Progress simple/functional exercise as tolerated

## 2019-07-15 ENCOUNTER — OFFICE VISIT (OUTPATIENT)
Dept: PHYSICAL THERAPY | Facility: CLINIC | Age: 84
End: 2019-07-15
Payer: MEDICARE

## 2019-07-15 DIAGNOSIS — R29.6 RECURRENT FALLS: Primary | ICD-10-CM

## 2019-07-15 PROCEDURE — 97530 THERAPEUTIC ACTIVITIES: CPT | Performed by: PHYSICAL THERAPIST

## 2019-07-15 PROCEDURE — 97112 NEUROMUSCULAR REEDUCATION: CPT | Performed by: PHYSICAL THERAPIST

## 2019-07-15 NOTE — PROGRESS NOTES
Daily Note     Today's date: 7/15/2019  Patient name: Marion Covarrubias  : 1935  MRN: [id number]  Referring provider: Alfredo Friend DO  Dx:   Encounter Diagnosis     ICD-10-CM    1  Recurrent falls R29 6                   Subjective: Pt son reports pt fell twice this morning, she was incontinent of urine and slipped on it  Pt arrived 15 min late to session, son reports 11:30 is the earliest they are able to get here although they were scheduled for 11:15  Objective: See treatment diary below   has a past medical history of Alzheimer disease, Arthritis, Dementia, Encounter for screening for osteoporosis, HL (hearing loss), Hyperlipidemia, Hypertension, and Sciatica  Treatment Diary 7/15/19: 3# ankle weights worn t/o session  STS  15  reps , no UE with high fives with cue to hit my hands   Stair climbing  steps x 8 cycles 1 UE  Step over 2 trac x2 for 10 reps  Alt toe taps 3# ankle weights to 12" height (side of  steps) 1 UE support x 12 reps bilat   Stacking cones from floor up to cabinet - 10 cones , performed twice  Nustep L4 10'    *Skin inspection performed due to son report of pt falling 2x this morning and landing on her buttocks  No bruising, redness or swelling noted  Pt did c/o pain w/ L hip flexion during step taps so modified and performed marching within painfree range  Assessment:   Max cuing for directions and desired movement during therapy session due to cognitive impairment  Difficulty w/ foot clearance over 2 trac, she does not step close enough to it prior to stepping over  She does well w/ functional tasks such as cone stacking to shelf  Pt would benefit from continued PT for further balance training to reduce falls  Plan: Continue per plan of care  Progress simple/functional exercise as tolerated

## 2019-07-17 ENCOUNTER — APPOINTMENT (OUTPATIENT)
Dept: PHYSICAL THERAPY | Facility: CLINIC | Age: 84
End: 2019-07-17
Payer: MEDICARE

## 2019-07-23 ENCOUNTER — APPOINTMENT (OUTPATIENT)
Dept: PHYSICAL THERAPY | Facility: CLINIC | Age: 84
End: 2019-07-23
Payer: MEDICARE

## 2019-07-25 ENCOUNTER — APPOINTMENT (OUTPATIENT)
Dept: PHYSICAL THERAPY | Facility: CLINIC | Age: 84
End: 2019-07-25
Payer: MEDICARE

## 2019-07-29 ENCOUNTER — TELEPHONE (OUTPATIENT)
Dept: PHYSICAL THERAPY | Facility: CLINIC | Age: 84
End: 2019-07-29

## 2019-07-29 NOTE — TELEPHONE ENCOUNTER
Received message that pt fell and son, Guero Kramer wants to hold appts until pt feels better  Voice message left this date requesting call back for details/plan

## 2019-07-30 ENCOUNTER — APPOINTMENT (OUTPATIENT)
Dept: PHYSICAL THERAPY | Facility: CLINIC | Age: 84
End: 2019-07-30
Payer: MEDICARE

## 2019-07-31 ENCOUNTER — OFFICE VISIT (OUTPATIENT)
Dept: FAMILY MEDICINE CLINIC | Facility: CLINIC | Age: 84
End: 2019-07-31
Payer: MEDICARE

## 2019-07-31 VITALS
OXYGEN SATURATION: 97 % | DIASTOLIC BLOOD PRESSURE: 100 MMHG | WEIGHT: 155 LBS | HEART RATE: 75 BPM | HEIGHT: 62 IN | SYSTOLIC BLOOD PRESSURE: 168 MMHG | BODY MASS INDEX: 28.52 KG/M2 | RESPIRATION RATE: 18 BRPM

## 2019-07-31 DIAGNOSIS — F02.81 ALZHEIMER'S DEMENTIA WITH BEHAVIORAL DISTURBANCE, UNSPECIFIED TIMING OF DEMENTIA ONSET (HCC): ICD-10-CM

## 2019-07-31 DIAGNOSIS — I10 BENIGN ESSENTIAL HYPERTENSION: Primary | ICD-10-CM

## 2019-07-31 DIAGNOSIS — G30.9 ALZHEIMER'S DEMENTIA WITH BEHAVIORAL DISTURBANCE, UNSPECIFIED TIMING OF DEMENTIA ONSET (HCC): ICD-10-CM

## 2019-07-31 DIAGNOSIS — R29.6 FALLS FREQUENTLY: ICD-10-CM

## 2019-07-31 DIAGNOSIS — R41.3 MEMORY LOSS: ICD-10-CM

## 2019-07-31 DIAGNOSIS — R39.81 URINARY INCONTINENCE DUE TO COGNITIVE IMPAIRMENT: ICD-10-CM

## 2019-07-31 LAB
SL AMB  POCT GLUCOSE, UA: NORMAL
SL AMB LEUKOCYTE ESTERASE,UA: 25
SL AMB POCT BILIRUBIN,UA: NORMAL
SL AMB POCT BLOOD,UA: NORMAL
SL AMB POCT CLARITY,UA: CLEAR
SL AMB POCT COLOR,UA: YELLOW
SL AMB POCT KETONES,UA: NORMAL
SL AMB POCT NITRITE,UA: NORMAL
SL AMB POCT PH,UA: 7
SL AMB POCT SPECIFIC GRAVITY,UA: 1.01
SL AMB POCT URINE PROTEIN: NORMAL
SL AMB POCT UROBILINOGEN: NORMAL

## 2019-07-31 PROCEDURE — 81003 URINALYSIS AUTO W/O SCOPE: CPT | Performed by: FAMILY MEDICINE

## 2019-07-31 PROCEDURE — 99214 OFFICE O/P EST MOD 30 MIN: CPT | Performed by: FAMILY MEDICINE

## 2019-07-31 NOTE — PROGRESS NOTES
Assessment/Plan:    No problem-specific Assessment & Plan notes found for this encounter  Diagnoses and all orders for this visit:    Benign essential hypertension    Alzheimer's dementia with behavioral disturbance, unspecified timing of dementia onset  -     Ambulatory referral to Neurology; Future    Urinary incontinence due to cognitive impairment  -     POCT urine dip auto non-scope    Memory loss    Falls frequently        Subjective:      Patient ID: Marion Covarrubias is a 81 y o  female  This is a f/u appt for this 80 yo pt with dementia and hypertension  Her son states she has fallen multiple times at home since her last appt  And twice this week  He describes bruises from the falls but no significant injuries  The pt is incontinent of urine all the time and is wearing Depends  The pt is having trouble with making sentences when she talks  It was hard to understand her in this appt  The son states he has a aide several times a week to watch his mother when he is shopping and doing household chores but she is still falling  The son states he needs more help but promised to not put his Mom in a NH  The following portions of the patient's history were reviewed and updated as appropriate: allergies, current medications, past family history, past medical history, past social history, past surgical history and problem list     Review of Systems   Constitutional: Negative  HENT: Negative  Respiratory: Negative  Cardiovascular: Negative  Neurological: Negative  Psychiatric/Behavioral: Positive for confusion and sleep disturbance  Objective:      /100   Pulse 75   Resp 18   Ht 5' 2" (1 575 m)   Wt 70 3 kg (155 lb)   SpO2 97%   BMI 28 35 kg/m²          Physical Exam   Constitutional: She appears well-developed and well-nourished  Pt is overweight with BMI of 28 35   Cardiovascular: Normal rate, regular rhythm, normal heart sounds and intact distal pulses  Pulmonary/Chest: Effort normal and breath sounds normal    Musculoskeletal:   Some instability with walking and helped by son   Psychiatric: She has a normal mood and affect  Impaired thought and judgement   Vitals reviewed

## 2019-07-31 NOTE — PATIENT INSTRUCTIONS
BP is elevated today  Son will restart Bystolic 10 mg daily to help BP  BP check in 1 month  Neuro referral given for worsening dementia  Will refer case to  for possible services at home  UA was negative for infection  Incontinence a function of dementia  Pt is going to the balance center because of the fall risk  Not sure pt can remain at home much longer with her dementia

## 2019-08-01 ENCOUNTER — APPOINTMENT (OUTPATIENT)
Dept: PHYSICAL THERAPY | Facility: CLINIC | Age: 84
End: 2019-08-01
Payer: MEDICARE

## 2019-08-02 DIAGNOSIS — F02.80 ALZHEIMER'S DISEASE OF OTHER ONSET WITHOUT BEHAVIORAL DISTURBANCE: ICD-10-CM

## 2019-08-02 DIAGNOSIS — G30.8 ALZHEIMER'S DISEASE OF OTHER ONSET WITHOUT BEHAVIORAL DISTURBANCE: ICD-10-CM

## 2019-08-02 RX ORDER — MEMANTINE HYDROCHLORIDE 28 MG/1
CAPSULE, EXTENDED RELEASE ORAL
Qty: 30 CAPSULE | Refills: 5 | Status: SHIPPED | OUTPATIENT
Start: 2019-08-02 | End: 2019-08-06

## 2019-08-06 ENCOUNTER — TELEPHONE (OUTPATIENT)
Dept: FAMILY MEDICINE CLINIC | Facility: CLINIC | Age: 84
End: 2019-08-06

## 2019-08-06 NOTE — TELEPHONE ENCOUNTER
DR Palmira Wilson - PT'S BP HAS BEEN RUNNING HIGH    HE WANTS TO SPEAK TO YOU ABOUT IT   WOULD LIKE CALL BACK

## 2019-08-07 ENCOUNTER — APPOINTMENT (OUTPATIENT)
Dept: PHYSICAL THERAPY | Facility: CLINIC | Age: 84
End: 2019-08-07
Payer: MEDICARE

## 2019-08-07 ENCOUNTER — PATIENT OUTREACH (OUTPATIENT)
Dept: FAMILY MEDICINE CLINIC | Facility: CLINIC | Age: 84
End: 2019-08-07

## 2019-08-07 DIAGNOSIS — Z74.1 NEED FOR ASSISTANCE WITH PERSONAL CARE: ICD-10-CM

## 2019-08-07 DIAGNOSIS — G30.9 ALZHEIMER'S DEMENTIA WITH BEHAVIORAL DISTURBANCE, UNSPECIFIED TIMING OF DEMENTIA ONSET (HCC): Primary | ICD-10-CM

## 2019-08-07 DIAGNOSIS — F02.81 ALZHEIMER'S DEMENTIA WITH BEHAVIORAL DISTURBANCE, UNSPECIFIED TIMING OF DEMENTIA ONSET (HCC): Primary | ICD-10-CM

## 2019-08-07 NOTE — PROGRESS NOTES
OP CM called to pts son Roman Starks who states that he private pays for someone to be there 2 hours a day six days a week in AM   Pt also is starting OP PT/OT 3 days a week  Son drives her to appts  Pt had multiple falls  Pt was advised to use a cane and son plans on getting her a Hurrycane  Pt has a roller walker but does not use  Pt has a platform to get into her home  Pt also has one step into the kitchen  Pts son will not consider assisted living and states he promised to take care of her at home  Asked son if he is able to have any time for himself and he states he does not but feels he is doing fine  Discussed caregiver needs  He states he used to love gardening but now has such bad arthritis  Discussed possibly doing indoor light gardening  Pts son also given list of private pay home health aides  Pt does not quality for medicaid and therefore no assistance through [facility]  OP CM gave son phone number and advised to call if he needs any further assistance

## 2019-08-08 ENCOUNTER — TELEPHONE (OUTPATIENT)
Dept: FAMILY MEDICINE CLINIC | Facility: CLINIC | Age: 84
End: 2019-08-08

## 2019-08-08 NOTE — TELEPHONE ENCOUNTER
Pt's mom used a different hair dye and it is causing redness/swelling on head and eyebrows  Wants to know what to use on it    Pt also fell 3 times yesterday and says toe hurts, pt seemed to forget out of a water bottle and missed chair a few times  Wondering if this could be from stopping numenda  Son wants a call back from dr Kacy Sebastian

## 2019-08-12 ENCOUNTER — APPOINTMENT (OUTPATIENT)
Dept: PHYSICAL THERAPY | Facility: CLINIC | Age: 84
End: 2019-08-12
Payer: MEDICARE

## 2019-08-13 ENCOUNTER — OFFICE VISIT (OUTPATIENT)
Dept: PHYSICAL THERAPY | Facility: CLINIC | Age: 84
End: 2019-08-13
Payer: MEDICARE

## 2019-08-13 DIAGNOSIS — R29.6 RECURRENT FALLS: Primary | ICD-10-CM

## 2019-08-13 PROCEDURE — 97110 THERAPEUTIC EXERCISES: CPT

## 2019-08-13 PROCEDURE — 97116 GAIT TRAINING THERAPY: CPT

## 2019-08-13 PROCEDURE — 97530 THERAPEUTIC ACTIVITIES: CPT

## 2019-08-13 NOTE — PROGRESS NOTES
Daily Note     Today's date: 2019  Patient name: Bigg Espana  : 1935  MRN: [id number]  Referring provider: Felix Ibrahim DO  Dx:   Encounter Diagnosis     ICD-10-CM    1  Recurrent falls R29 6        Start Time: 1215  Stop Time: 1300  Total time in clinic (min): 45 minutes    Subjective: Pt arrived with son today, son reports no falls in the past week, states that 2 weeks ago patient had multiple falls and cannot get up from the floor  Objective: See treatment diary below   has a past medical history of Alzheimer disease, Arthritis, Dementia, Encounter for screening for osteoporosis, HL (hearing loss), Hyperlipidemia, Hypertension, and Sciatica  Nustep L4 15'  STS-   20 reps , no UE with high fives with cue to hit my hands   Stair climbing  steps x 8 cycles, 1 UE, 8" steps  Ambulation with holding 10 lb med ball- 300 feet, 2 sets, close supervision support   Ambulation over mat/compliant surface- 10 cycles of 10 feet  Tossing and Catching ball- 10 reps   Ambulation forward and backward, catching ball- 10 reps   Step over 2 trac x2 for 10 reps, performed 10 cycles   Ambulation with walking while holding bowling pin, hand off to son- 7 bowling pins   Ambulation with walking with dumb beat- 60 bpm, 300 feet        Assessment:   Patient continues to require max cuing with all activities x1 today due to her mental status, performs best with simple one step commands  No instability noted during ambulation today, able to slightly progress her dual tasking with following commands of ambulation while holding objects such as weighted ball and bowling pins  Patient ambulates with decreased step height as well as decreased stride length, requiring constant cuing to improve her step height and reduce her foot dragging  Pt would benefit from continued PT for further balance training to reduce falls  Plan: Continue per plan of care  Progress simple/functional exercise as tolerated

## 2019-08-15 ENCOUNTER — APPOINTMENT (OUTPATIENT)
Dept: PHYSICAL THERAPY | Facility: CLINIC | Age: 84
End: 2019-08-15
Payer: MEDICARE

## 2019-08-16 ENCOUNTER — OFFICE VISIT (OUTPATIENT)
Dept: PHYSICAL THERAPY | Facility: CLINIC | Age: 84
End: 2019-08-16
Payer: MEDICARE

## 2019-08-16 DIAGNOSIS — R29.6 RECURRENT FALLS: Primary | ICD-10-CM

## 2019-08-16 PROCEDURE — 97110 THERAPEUTIC EXERCISES: CPT | Performed by: PHYSICAL THERAPIST

## 2019-08-16 PROCEDURE — 97530 THERAPEUTIC ACTIVITIES: CPT | Performed by: PHYSICAL THERAPIST

## 2019-08-16 PROCEDURE — 97112 NEUROMUSCULAR REEDUCATION: CPT | Performed by: PHYSICAL THERAPIST

## 2019-08-16 NOTE — PROGRESS NOTES
Daily Note     Today's date: 2019  Patient name: Remy Gramajo  : 1935  MRN: [id number]  Referring provider: Khushi Reid DO  Dx:   Encounter Diagnosis     ICD-10-CM    1  Recurrent falls R29 6                   Subjective: Pt arrived with son today, son reports no falls in the past 2 weeks, Falling backwards when walking, Notes very high step to get into the home about 12 inch step  Objective: See treatment diary below   has a past medical history of Alzheimer disease, Arthritis, Dementia, Encounter for screening for osteoporosis, HL (hearing loss), Hyperlipidemia, Hypertension, and Sciatica  Nustep L4 10'  STS-   20 reps , eyes closed  Curb navigation outdoors   6 inch step 10 reps   8 inch step 10 reps cuing to look at feet  Posterior lean 3 times with Min A to correct loss of balance  Floor transfer:   Attempted 3 times without use of external support unable  Use of chair 2 times with able to complete transfer Max Cuing for sequence and phyiscal assistance for placement  Education with son, with hand over hand floor transfer with PT 2 times and patient son conducting 3 times once with sitting in chair  Assessment:   Max cuing for safety with all mobility, poor carry over and awareness  Outdoor curb navigation patient had tendency to not look at feet with stepping up or down with curb  Max cuing with floor transfer with several times stuck and required physical assistance by therapist to obtain correct posture  Pt would benefit from continued PT for further balance training to reduce falls  Plan: Continue per plan of care  Progress simple/functional exercise as tolerated

## 2019-08-19 ENCOUNTER — OFFICE VISIT (OUTPATIENT)
Dept: PHYSICAL THERAPY | Facility: CLINIC | Age: 84
End: 2019-08-19
Payer: MEDICARE

## 2019-08-19 DIAGNOSIS — R29.6 RECURRENT FALLS: Primary | ICD-10-CM

## 2019-08-19 PROCEDURE — 97530 THERAPEUTIC ACTIVITIES: CPT | Performed by: PHYSICAL THERAPIST

## 2019-08-19 PROCEDURE — 97112 NEUROMUSCULAR REEDUCATION: CPT | Performed by: PHYSICAL THERAPIST

## 2019-08-19 NOTE — PROGRESS NOTES
Daily Note     Today's date: 2019  Patient name: Queen Shoshana  : 1935  MRN: [id number]  Referring provider: Sara Torres DO  Dx:   Encounter Diagnosis     ICD-10-CM    1  Recurrent falls R29 6                   Subjective: Pt arrived with son today, son reports no falls but feeling off more today  Objective: See treatment diary below   has a past medical history of Alzheimer disease, Arthritis, Dementia, Encounter for screening for osteoporosis, HL (hearing loss), Hyperlipidemia, Hypertension, and Sciatica  Nustep L4 10'  STS-   20 reps , eyes closed   Curb navigation outdoors   6 inch step 10 reps   8 inch step 10 reps cuing to look at feet  Posterior lean 3 times with Min A to correct loss of balance  Stepping over hurdles 4 at 6 inch height:         Assessment:   Max cuing for safety with all mobility, poor carry over and awareness  Outdoor curb navigation patient had tendency to not look at feet again today  Challenged with p ball kick and hurdles with max directions  Pt would benefit from continued PT for further balance training to reduce falls  Plan: Continue per plan of care  Progress simple/functional exercise as tolerated

## 2019-08-21 ENCOUNTER — APPOINTMENT (OUTPATIENT)
Dept: PHYSICAL THERAPY | Facility: CLINIC | Age: 84
End: 2019-08-21
Payer: MEDICARE

## 2019-08-21 ENCOUNTER — OFFICE VISIT (OUTPATIENT)
Dept: PHYSICAL THERAPY | Facility: CLINIC | Age: 84
End: 2019-08-21
Payer: MEDICARE

## 2019-08-21 DIAGNOSIS — R29.6 RECURRENT FALLS: Primary | ICD-10-CM

## 2019-08-21 PROCEDURE — 97110 THERAPEUTIC EXERCISES: CPT

## 2019-08-21 PROCEDURE — 97530 THERAPEUTIC ACTIVITIES: CPT

## 2019-08-21 PROCEDURE — 97112 NEUROMUSCULAR REEDUCATION: CPT

## 2019-08-21 NOTE — PROGRESS NOTES
Daily Note     Today's date: 2019  Patient name: Bebeto Lozano  : 1935  MRN: [id number]  Referring provider: Ping Teresa DO  Dx:   Encounter Diagnosis     ICD-10-CM    1  Recurrent falls R29 6        Start Time: 1200  Stop Time: 1245  Total time in clinic (min): 45 minutes    Subjective: Pt arrived with son today, son reports no falls but reported high blood pressure this morning  Objective: See treatment diary below   has a past medical history of Alzheimer disease, Arthritis, Dementia, Encounter for screening for osteoporosis, HL (hearing loss), Hyperlipidemia, Hypertension, and Sciatica  BP upon arrival- 150/90 mmHG    Nustep L4, 15 minutes at end of session  Step Ups- Forward and Laterally with Colored discs for visual cuing- 10 cycles of 10 feet each   STS-   20 reps, eyes closed, 20 reps holding 4 lb med ball   Curb navigation outdoors- 10 minutes  Sit to stand with cone hand off- 10 cones, 10 sit to stands with direction to hand off to son  Reaching outside JON with hand taps- 20 reps bilaterally  Carrying med ball- 10 cycles of 15 feet, 10 lb med ball   Weight Retrieval- 2 lb cuff weights, 10 weights    End of session - 156/95 mmHG        Assessment:   Patient improving with her ability to follow one step commands, patient challenged with her ability to reach for the floor, required one step commands and close supervision for all activities  Patient challenged with her step height, but improved with visual cuing from colored dots  Pt would benefit from continued PT for further balance training to reduce falls  Plan: Continue per plan of care  Progress simple/functional exercise as tolerated

## 2019-08-26 ENCOUNTER — OFFICE VISIT (OUTPATIENT)
Dept: PHYSICAL THERAPY | Facility: CLINIC | Age: 84
End: 2019-08-26
Payer: MEDICARE

## 2019-08-26 DIAGNOSIS — R29.6 RECURRENT FALLS: Primary | ICD-10-CM

## 2019-08-26 PROCEDURE — 97530 THERAPEUTIC ACTIVITIES: CPT

## 2019-08-26 PROCEDURE — 97112 NEUROMUSCULAR REEDUCATION: CPT

## 2019-08-26 NOTE — PROGRESS NOTES
Progress Note/Daily Note     Today's date: 2019  Patient name: Bebeto Lozano  : 1935  MRN: [id number]  Referring provider: Ping Teresa DO  Dx:   Encounter Diagnosis     ICD-10-CM    1  Recurrent falls R29 6        Start Time: 1200  Stop Time: 1245  Total time in clinic (min): 45 minutes    Subjective: Patient reported to the clinic today with her son  Patient's son stated that over the past month she has had at least 4 falls, 2 falls occurring last week  Patient's son reported that her primary care physician took her off  Nemenda due to increase in fall risk associated with medication  Noted that she has been off this medication for 3 weeks  Son reported that she had 2 falls last week due to decreased safety awareness  Objective: See treatment diary below   has a past medical history of Alzheimer disease, Arthritis, Dementia, Encounter for screening for osteoporosis, HL (hearing loss), Hyperlipidemia, Hypertension, and Sciatica  Outcome Measures  -TUG- 22 66 seconds  -5x sit to stand- 14 63 seconds  -30 second sit to stand- 10 reps   -ANDERS- 45/56  -Gait Speed- 10 meters/18 5 second-  54 meters/second   -6 minute walk test- feet no AD - 475 feet    Step Ups- Forward and Laterally with Colored discs for visual cuing- 10 cycles of 10 feet each, close supervision to contact guard noted  STS-   20 reps, eyes closed, 20 reps holding 6 lb med ball   Nu Step- 10 minutes L4 beginning of session       STG 2-4 weeks  1  Pt will demonstrate safe, independent use of AD for ambulation on  level surfaces, curbs, stairs and perturb surfaces - NOT MET  2  Pt will be independent in early HEP  MET with family   3  Pt will independently rise from sitting every 15-20 minutes at home, and be active for 10-15 minutes  NOT MET    LTG 4-8 weeks  1  Pt will score at a safe level on standardized testing for dynamic/community mobility  NOT MET  2    Pt will improve narrow-based balance with eyes closed to 15 seconds seconds for improved safety with ADL's in low lighting  -NOT MET  3  Pt will be independent in advanced HEP - Partially MET  4  Improve 5XSTS score to 20 seconds to improve speed of transfers  MET   5  Improve TUG score to 15  seconds to improve speed of transfers and mobility  NOT MET  6  Improve gait speed to 0 78 M/sec during 10M Walk Test to improve speed and safety of mobility   (age norm) NOT MET  7  Pt will participate in a discussion about [facility] or [facility] attendance, vs Chair Yoga or Balance Class after DC from PT - NOT MET    Assessment:   Santana Brady is a 81year old female who demonstrates slight decrease in objective measures over the past month ([place]ull[place], 5XSTS, 30 seconds sit to stand, 6 minute walk test, and gait speed ) Pt has trouble following simple commands but improves with repetition, which may be attributed to her comorbidity of alzheimer's disease  She continues to demonstrate reduced step height and step length which reduce her safety, specifically with stair negotiation, which according to subjective report of son, patient has had an increase in falls in relationship to the steps  She occasionally displays poor foot placement and ineffective balance responses  Stepping over objects taller than 4" remains challenging for her, specifically 6" steps  SPC is recommended for ambulation (pt/family now agreeable, but has not purchased or examined to get one for her ) Focus of therapy going forward will be increasing her step height and gaining independence with SPC as well as functional independence  Pt continues to need encouragement/supervision to reduce her sitting time at home  Patient challenged with increasing step height during gait without cuing  Advise continued PT per POC due to fall risk status and to maximize function status  Plan: Continue per plan of care  Progress simple/functional exercise as tolerated

## 2019-08-28 ENCOUNTER — OFFICE VISIT (OUTPATIENT)
Dept: FAMILY MEDICINE CLINIC | Facility: CLINIC | Age: 84
End: 2019-08-28
Payer: MEDICARE

## 2019-08-28 VITALS
OXYGEN SATURATION: 97 % | HEART RATE: 54 BPM | DIASTOLIC BLOOD PRESSURE: 82 MMHG | BODY MASS INDEX: 28.16 KG/M2 | SYSTOLIC BLOOD PRESSURE: 128 MMHG | WEIGHT: 153 LBS | HEIGHT: 62 IN

## 2019-08-28 DIAGNOSIS — R39.81 URINARY INCONTINENCE DUE TO COGNITIVE IMPAIRMENT: ICD-10-CM

## 2019-08-28 DIAGNOSIS — R29.6 FALLS FREQUENTLY: ICD-10-CM

## 2019-08-28 DIAGNOSIS — I10 BENIGN ESSENTIAL HYPERTENSION: ICD-10-CM

## 2019-08-28 DIAGNOSIS — F02.80 EARLY ONSET ALZHEIMER'S DEMENTIA WITHOUT BEHAVIORAL DISTURBANCE (HCC): Primary | ICD-10-CM

## 2019-08-28 DIAGNOSIS — G30.0 EARLY ONSET ALZHEIMER'S DEMENTIA WITHOUT BEHAVIORAL DISTURBANCE (HCC): Primary | ICD-10-CM

## 2019-08-28 DIAGNOSIS — G31.84 MILD COGNITIVE IMPAIRMENT WITH MEMORY LOSS: ICD-10-CM

## 2019-08-28 PROCEDURE — 99214 OFFICE O/P EST MOD 30 MIN: CPT | Performed by: FAMILY MEDICINE

## 2019-08-28 PROCEDURE — 1124F ACP DISCUSS-NO DSCNMKR DOCD: CPT | Performed by: FAMILY MEDICINE

## 2019-08-28 NOTE — PROGRESS NOTES
Assessment/Plan:    No problem-specific Assessment & Plan notes found for this encounter  Diagnoses and all orders for this visit:    Early onset Alzheimer's dementia without behavioral disturbance    Falls frequently    Mild cognitive impairment with memory loss    Urinary incontinence due to cognitive impairment    Benign essential hypertension        Subjective:      Patient ID: Atilio Redman is a 81 y o  female  This is a f/u appt to check hypertension  The pt has fallen two more times  She is going to PT but isn't using a walker at home as instructed by PT  Her speech is garbled an d not intelligible  Her dementia is worse  Her son has help with her ADL's in the AM and PM   A person is cooking for her at lunch  In spite of the extra help her son is overwhelmed  She gets up at night with urinary incontinence  She is wearing Depends all the time  She has a large bruise on her left thigh from the fall a week ago  The following portions of the patient's history were reviewed and updated as appropriate: allergies, current medications, past family history, past medical history, past social history, past surgical history and problem list     Review of Systems   Genitourinary:        Urinary incontinence   Psychiatric/Behavioral: Positive for confusion, decreased concentration and sleep disturbance  Objective:      /82   Pulse (!) 54   Ht 5' 2" (1 575 m)   Wt 69 4 kg (153 lb)   SpO2 97%   BMI 27 98 kg/m²          Physical Exam   Constitutional: She appears well-developed and well-nourished  Very confused but smiling and pleasant but speech garbled   Cardiovascular: Normal rate, regular rhythm and normal heart sounds  Pulmonary/Chest: Effort normal and breath sounds normal    Neurological: Coordination abnormal    Skin:   Large ecchymotic area on the left thigh  No tenderness to palpation  Psychiatric:   Impaired thought and judgement   Vitals reviewed

## 2019-08-28 NOTE — PATIENT INSTRUCTIONS
Hypertension is stable  Pt will continue her present meds  Pt has urinary incontinence and will continue to use Depends  The pt has moderate dementia  I don't believe the son can do any more at home  She is probably a placement issue  I have left a note for case man to see what else can be done and if there are more services at home vs Skilled NH    Check BP in one month

## 2019-08-29 ENCOUNTER — PATIENT OUTREACH (OUTPATIENT)
Dept: FAMILY MEDICINE CLINIC | Facility: CLINIC | Age: 84
End: 2019-08-29

## 2019-08-29 ENCOUNTER — OFFICE VISIT (OUTPATIENT)
Dept: PHYSICAL THERAPY | Facility: CLINIC | Age: 84
End: 2019-08-29
Payer: MEDICARE

## 2019-08-29 DIAGNOSIS — R29.6 RECURRENT FALLS: Primary | ICD-10-CM

## 2019-08-29 PROCEDURE — 97112 NEUROMUSCULAR REEDUCATION: CPT

## 2019-08-29 PROCEDURE — 97530 THERAPEUTIC ACTIVITIES: CPT

## 2019-08-29 NOTE — PROGRESS NOTES
Daily Note     Today's date: 2019  Patient name: Zoila Leung  : 1935  MRN: [id number]  Referring provider: Domenic Thornton DO  Dx:   Encounter Diagnosis     ICD-10-CM    1  Recurrent falls R29 6        Start Time: 1150  Stop Time: 1245  Total time in clinic (min): 55 minutes    Subjective: Pt arrived with son today, son reports no falls, no pain noted  Objective: See treatment diary below   has a past medical history of Alzheimer disease, Arthritis, Dementia, Encounter for screening for osteoporosis, HL (hearing loss), Hyperlipidemia, Hypertension, and Sciatica  BP upon arrival- 150/78 mmHG    Nustep L4, 10 minutes at beginning  of session  Step Ups- Forward and Laterally with Colored discs for visual cuing- 10 cycles of 10 feet each   STS-   20 reps, eyes closed, 20 reps holding 4 lb med ball   Stairs with B/L rails 6 steps 4 sets  Sit to stand with cone hand off- 10 cones, 10 sit to stands with direction to hand off to son  Reaching outside JON with hand taps- 20 reps bilaterally  Lunge stepping alternating lead, difficulty with following directions with heavy manual/verbal cuing needed  Weight Retrieval- 2 lb cuff weights, 10 weights        Assessment:   Patient continues to require close supervision t/o session  Improved performance with simple verbal cuing accompanied by manual cuing  Step height improved with marching, shuffling gait continues to be noted with ambulation over time with frequent cuing needed to increase step height  Reviewed HEP with son and encouraged continued participation  Pt would benefit from continued PT for further balance training to reduce falls  Plan: Continue per plan of care  Progress simple/functional exercise as tolerated

## 2019-09-03 ENCOUNTER — OFFICE VISIT (OUTPATIENT)
Dept: PHYSICAL THERAPY | Facility: CLINIC | Age: 84
End: 2019-09-03
Payer: MEDICARE

## 2019-09-03 ENCOUNTER — PATIENT OUTREACH (OUTPATIENT)
Dept: FAMILY MEDICINE CLINIC | Facility: CLINIC | Age: 84
End: 2019-09-03

## 2019-09-03 DIAGNOSIS — R29.6 RECURRENT FALLS: Primary | ICD-10-CM

## 2019-09-03 PROCEDURE — 97530 THERAPEUTIC ACTIVITIES: CPT

## 2019-09-03 PROCEDURE — 97110 THERAPEUTIC EXERCISES: CPT

## 2019-09-03 PROCEDURE — 97112 NEUROMUSCULAR REEDUCATION: CPT

## 2019-09-03 NOTE — PROGRESS NOTES
OP CM called and spoke to pts son Macarena Plunkett  He states he has aides now coming to the house 2 hours in am and then 430-8pm at night  Macarena Plunkett also drives his mother to OP PT/OT three times a week  Macarena Plunkett states that they are trying to have pt walk with a walker  Son is still adamant about keeping pt home and does not want to consider assisted living  OP CM will continue to follow

## 2019-09-03 NOTE — PROGRESS NOTES
Daily Note     Today's date: 9/3/2019  Patient name: Claudia Ely  : 1935  MRN: [id number]  Referring provider: Joann Day DO  Dx:   Encounter Diagnosis     ICD-10-CM    1  Recurrent falls R29 6        Start Time: 1215  Stop Time: 1300  Total time in clinic (min): 45 minutes    Subjective: Pt arrived with son today, son reports no falls, no pain noted         Objective: See treatment diary below   has a past medical history of Alzheimer disease, Arthritis, Dementia, Encounter for screening for osteoporosis, HL (hearing loss), Hyperlipidemia, Hypertension, and Sciatica     -Nustep L4, 10 minutes at beginning of session, UE and LE  -Step Ups- Forward and Laterally with Colored discs for visual cuing- 10 cycles of 10 feet each   -STS-   20 reps, eyes closed, 20 reps holding 4 lb med ball   -Sit to stand with cone hand off- 10 cones, 10 sit to stands with direction to hand off to son laterally  -Reaching outside JON with hand taps- 20 reps bilaterally  -Lunge stepping alternating lead, difficulty with following directions with heavy manual/verbal cuing needed  -Weight Retrieval- 2 lb cuff weights, 10 weights   -Ascending and descending steps- 4" and 8" steps, 10 cycles each, 2 UE support  -2 UE support with soccer kick- yellow physioball, 20 reps      Patient was assisted by Renetta Blakely DPT for 15 minutes of treatment to maintain 1-1 time per Medicare guidelines     Assessment:   Patient continues to require close supervision t/o session due to potential cognitive deficits, patient required constant close supervision for all interventions  Patient challenged with improving step height continuously despite patient's consistent cuing from son and treating therapist  Patient attempted reciprocal stepping which is an improvement from prior sessions due to self cuing to initiate stepping    Pt would benefit from continued PT for further balance training to reduce falls          Plan: Continue per plan of care    Progress simple/functional exercise as tolerated

## 2019-09-06 ENCOUNTER — APPOINTMENT (OUTPATIENT)
Dept: PHYSICAL THERAPY | Facility: CLINIC | Age: 84
End: 2019-09-06
Payer: MEDICARE

## 2019-09-10 ENCOUNTER — OFFICE VISIT (OUTPATIENT)
Dept: PHYSICAL THERAPY | Facility: CLINIC | Age: 84
End: 2019-09-10
Payer: MEDICARE

## 2019-09-10 DIAGNOSIS — R29.6 RECURRENT FALLS: Primary | ICD-10-CM

## 2019-09-10 PROCEDURE — 97530 THERAPEUTIC ACTIVITIES: CPT | Performed by: PHYSICAL THERAPIST

## 2019-09-10 PROCEDURE — 97112 NEUROMUSCULAR REEDUCATION: CPT | Performed by: PHYSICAL THERAPIST

## 2019-09-10 NOTE — PROGRESS NOTES
Daily Note     Today's date: 9/10/2019  Patient name: Remy Gramajo  : 1935  MRN: [id number]  Referring provider: Khushi Reid DO  Dx:   Encounter Diagnosis     ICD-10-CM    1  Recurrent falls R29 6                   Subjective: Pt arrived with son today  He reports she fell 4 days ago, slid off the edge of bed          Objective: See treatment diary below   has a past medical history of Alzheimer disease, Arthritis, Dementia, Encounter for screening for osteoporosis, HL (hearing loss), Hyperlipidemia, Hypertension, and Sciatica     -Nustep L4, 8 minutes at beginning of session, UE and LE  -Weight Retrieval- 2 lb cuff weights, 8 weights  -Step Ups- Forward - 20 reps  -Step taps w/ colored discs for visual cueing - 12 reps each foot  -Sit to stand with cone hand off- 10 cones, 10 sit to stands with direction to hand off to son laterally   -Ascending and descending steps- 4" and 6" steps, 10 cycles each, 2 UE support  -Stepping over 2 trac - 10x  -Ambulating while carrying 8# weighted med ball - 150 ft, 2 laps         Assessment:   Patient responds well to demonstration prior to performing a task  Overwhelmed w/ too many verbal cues from son and therapist at the same time - responds well to 1 person cueing her w/ simple verbage  Continues to display difficulty placing entire foot on stair , leading to an increased fall risk and safety hazard  Shuffling gait pattern noted  Pt would benefit from continued PT for further balance training to reduce falls          Plan: Continue per plan of care  Progress simple/functional exercise as tolerated

## 2019-09-12 ENCOUNTER — OFFICE VISIT (OUTPATIENT)
Dept: PHYSICAL THERAPY | Facility: CLINIC | Age: 84
End: 2019-09-12
Payer: MEDICARE

## 2019-09-12 ENCOUNTER — TELEPHONE (OUTPATIENT)
Dept: FAMILY MEDICINE CLINIC | Facility: CLINIC | Age: 84
End: 2019-09-12

## 2019-09-12 DIAGNOSIS — R29.6 RECURRENT FALLS: Primary | ICD-10-CM

## 2019-09-12 DIAGNOSIS — R29.6 FREQUENT FALLS: Primary | ICD-10-CM

## 2019-09-12 PROCEDURE — 97110 THERAPEUTIC EXERCISES: CPT | Performed by: PHYSICAL THERAPIST

## 2019-09-12 PROCEDURE — 97530 THERAPEUTIC ACTIVITIES: CPT | Performed by: PHYSICAL THERAPIST

## 2019-09-12 NOTE — PROGRESS NOTES
Daily Note     Today's date: 2019  Patient name: Noris Hook  : 1935  MRN: [id number]  Referring provider: Emily Christian DO  Dx:   Encounter Diagnosis     ICD-10-CM    1  Recurrent falls R29 6                   Subjective: Pt arrived with son today           Objective: See treatment diary below   has a past medical history of Alzheimer disease, Arthritis, Dementia, Encounter for screening for osteoporosis, HL (hearing loss), Hyperlipidemia, Hypertension, and Sciatica       -Weight Retrieval- 3 and 4 lb cuff weights, 8 weights  -Step taps w/ colored discs for visual cueing , 6" and 12" step- 20 reps each foot  -Ascending and descending  steps- 4" and 6" steps, 10 cycles each, 1-2 UE support  -Sit to stand 2 sets 10 w/ demonstration in front of pt, 1 set of 10 holding 6# med ball   -Stepping over 2 trac - 10x  -Stacking cones from floor to cabinet - 8 cones, 2 reps   -Ambulating while carrying 8# weighted med ball - 150 ft, 2 laps         Assessment:   Patient tolerated treatment session well today  Improved foot placement during stair negotiation today  Increased difficulty lifting heavier weights off floor today w/ festination noted after picking it up  Pt would benefit from continued PT for further balance training to reduce falls          Plan: Continue per plan of care  Progress simple/functional exercise as tolerated

## 2019-09-17 ENCOUNTER — OFFICE VISIT (OUTPATIENT)
Dept: PHYSICAL THERAPY | Facility: CLINIC | Age: 84
End: 2019-09-17
Payer: MEDICARE

## 2019-09-17 DIAGNOSIS — R29.6 RECURRENT FALLS: Primary | ICD-10-CM

## 2019-09-17 PROCEDURE — 97530 THERAPEUTIC ACTIVITIES: CPT | Performed by: PHYSICAL THERAPIST

## 2019-09-17 PROCEDURE — 97110 THERAPEUTIC EXERCISES: CPT | Performed by: PHYSICAL THERAPIST

## 2019-09-17 NOTE — PROGRESS NOTES
Daily Note     Today's date: 2019  Patient name: Atilio Redman  : 1935  MRN: [id number]  Referring provider: Prosper Dougherty DO  Dx:   Encounter Diagnosis     ICD-10-CM    1  Recurrent falls R29 6                   Subjective: Pt arrived with son today, no reports of recent falls           Objective: See treatment diary below   has a past medical history of Alzheimer disease, Arthritis, Dementia, Encounter for screening for osteoporosis, HL (hearing loss), Hyperlipidemia, Hypertension, and Sciatica       -Weight Retrieval- 3 lb cuff weights, 5 weights total  -Step taps w/ colored discs for visual cueing , 6" and 12" step- 20 reps each foot  -Ascending and descending  steps- 4" and 6" steps, 10 cycles each, 1-2 UE support  -Sit to stand 2 sets 10 w/ demonstration in front of pt, 1 set of 10 holding 8# med ball and passing to therapist  -Stepping over 2 trac - 10x (use of colored discs for foot placement)   -Stepping over 6" hurdles (3 hurdles)     Nustep end of session- L2 x 10 min         Assessment:   Patient tolerated treatment session well today  Follows simple one step commands, does well with visual aides (colored disc) and demonstration from therapist  Able to progress to stepping over 6" hurdles today , clears KYRA's 50% of the time  Pt would benefit from continued PT for further balance training to reduce falls          Plan: Continue per plan of care  Progress simple/functional exercise as tolerated

## 2019-09-18 ENCOUNTER — APPOINTMENT (OUTPATIENT)
Dept: PHYSICAL THERAPY | Facility: CLINIC | Age: 84
End: 2019-09-18
Payer: MEDICARE

## 2019-09-19 ENCOUNTER — OFFICE VISIT (OUTPATIENT)
Dept: PHYSICAL THERAPY | Facility: CLINIC | Age: 84
End: 2019-09-19
Payer: MEDICARE

## 2019-09-19 DIAGNOSIS — R29.6 RECURRENT FALLS: Primary | ICD-10-CM

## 2019-09-19 PROCEDURE — 97112 NEUROMUSCULAR REEDUCATION: CPT

## 2019-09-19 NOTE — PROGRESS NOTES
Daily Note     Today's date: 2019  Patient name: Santana Brady  : 1935  MRN: [id number]  Referring provider: Renetta Negron DO  Dx:   Encounter Diagnosis     ICD-10-CM    1  Recurrent falls R29 6        Start Time: 1330  Stop Time: 1415  Total time in clinic (min): 45 minutes    Subjective: Pt arrived with son today, reported a fall yesterday going into [employer], patient did not lift her leg up high enough and fell walking into the restaurant          Objective: See treatment diary below   has a past medical history of Alzheimer disease, Arthritis, Dementia, Encounter for screening for osteoporosis, HL (hearing loss), Hyperlipidemia, Hypertension, and Sciatica       -Weight Retrieval- 3 lb cuff weights, 6 weights total  -Step taps w/ colored discs for visual cueing , 6" and 12" step- 20 reps each foot, 2 UE support  -Ascending and descending  steps- 4" and 8" steps, 10 cycles each, 1-2 UE support  -Sit to stand 2 sets 10 w/ demonstration in front of pt, 1 set of 10 holding 8# med ball and passing to therapist  -Stepping over 2 trac - 10x (use of colored discs for foot placement)   -Stepping over 6" hurdles (3 hurdles)- 15 feet today, 10 cycles  -Weaving through cones today, following treating PT    Nustep end of session- L2 x 10 min         Assessment:   Patient tolerated treatment session fairly today, noted challenged with overall tolerance to therapy today  Patient required increased attention today during therapy to improve her attentiveness to activity  Patient required contact guard during there entire appointment today, patient challenged to attentiveness to therapy today  Pt would benefit from continued PT for further balance training to reduce falls          Plan: Continue per plan of care  Progress simple/functional exercise as tolerated

## 2019-09-23 ENCOUNTER — TELEPHONE (OUTPATIENT)
Dept: NEUROLOGY | Facility: CLINIC | Age: 84
End: 2019-09-23

## 2019-09-23 ENCOUNTER — CONSULT (OUTPATIENT)
Dept: NEUROLOGY | Facility: CLINIC | Age: 84
End: 2019-09-23
Payer: MEDICARE

## 2019-09-23 VITALS
HEIGHT: 62 IN | HEART RATE: 62 BPM | SYSTOLIC BLOOD PRESSURE: 132 MMHG | BODY MASS INDEX: 27.42 KG/M2 | DIASTOLIC BLOOD PRESSURE: 63 MMHG | WEIGHT: 149 LBS

## 2019-09-23 DIAGNOSIS — R29.6 RECURRENT FALLS: ICD-10-CM

## 2019-09-23 DIAGNOSIS — G30.1 LATE ONSET ALZHEIMER'S DISEASE WITHOUT BEHAVIORAL DISTURBANCE (HCC): Primary | ICD-10-CM

## 2019-09-23 DIAGNOSIS — F02.80 LATE ONSET ALZHEIMER'S DISEASE WITHOUT BEHAVIORAL DISTURBANCE (HCC): Primary | ICD-10-CM

## 2019-09-23 PROCEDURE — 99204 OFFICE O/P NEW MOD 45 MIN: CPT | Performed by: PSYCHIATRY & NEUROLOGY

## 2019-09-23 RX ORDER — GALANTAMINE HYDROBROMIDE 4 MG/1
8 TABLET, FILM COATED ORAL 2 TIMES DAILY WITH MEALS
Qty: 120 TABLET | Refills: 3 | Status: SHIPPED | OUTPATIENT
Start: 2019-09-23 | End: 2019-09-24

## 2019-09-23 NOTE — PROGRESS NOTES
Review of Systems   Constitutional: Negative  Negative for appetite change and fever  HENT: Negative  Negative for hearing loss, tinnitus, trouble swallowing and voice change  Eyes: Negative  Negative for photophobia and pain  Respiratory: Negative  Negative for shortness of breath  Cardiovascular: Negative  Negative for palpitations  Gastrointestinal: Negative  Negative for nausea and vomiting  Endocrine: Negative  Negative for cold intolerance and heat intolerance  Genitourinary: Negative  Negative for dysuria, frequency and urgency  Musculoskeletal: Positive for gait problem  Negative for myalgias and neck pain  Skin: Negative  Negative for rash  Allergic/Immunologic: Negative  Neurological: Negative for dizziness, tremors, seizures, syncope, facial asymmetry, speech difficulty, weakness, light-headedness, numbness and headaches  Positive for memory problems  Positive for forgetfulness     Hematological: Negative  Does not bruise/bleed easily  Psychiatric/Behavioral: Positive for confusion  Negative for hallucinations and sleep disturbance

## 2019-09-23 NOTE — TELEPHONE ENCOUNTER
----- Message from Raul Bills MD sent at 9/23/2019  1:55 PM EDT -----  Shakira Caban, I have a lady with Alzheimer's dementia living at home with son but son does not want her to go to nursing home and would be very interested in any resources for 24 hour care at home, They already have a nursing aid coming in at night and trying to hire someone for the morning, but theres gap in time where she is unsupervised  They live in [place]  Any thoughts? Thanks!

## 2019-09-23 NOTE — Clinical Note
Ashley Lazo, I have a lady with Alzheimer's dementia living at home with son but son does not want her to go to nursing home and would be very interested in any resources for 24 hour care at home, They already have a nursing aid coming in at night and trying to hire someone for the morning, but theres gap in time where she is unsupervised  They live in [address] [place] [address]  Any thoughts? Thanks!

## 2019-09-23 NOTE — PATIENT INSTRUCTIONS
· No recent or relevant laboratory results in last 3 months to review  · Order lab tests as above to r/o any reversible metabolic causes of dementia  · No recent or relevant neuroimaging results to review  Prior CT of head and c-spine for falls in 5/2019 showed severe degenerative changes but no cord pathology and normal for age intracranially  · MRI brain NeuroQuant   · She has already been to PT for the past three months  She will need hip huggers, available on [employer] or medical supply store  Will send for a script  · We will consult MSW Social Work to contact them about local resources for aides in [facility]  They are interested in staying in their own home if possible  · She has never been on galantamine  We will start instead of galantamine 4mg tablets, to be taken according to the following: At any point if symptoms are controlled then can hold that dose without going up further  If feels any side effects (sedation) that are intolerable, then go back down to next lower dose and hold there, then call us  Alternative is topiramate, but has side effect of increasing paresthesia  Week 1:  Start on 0 5 tab twice a day  Week 2:  Take 1 tab in AM / 1 tab at bedtime  Week 3:  Take 1 5 tab twice a day  Week 4:  Take 2 tab twice a day  · Encouraged heavily to utilize the walker  · Encouraged to remain both physically and mentally active, including crossword puzzles, brain teasers  Online resources at [employer] for cognitive training games for free  · Thank you very much for sending me this interesting patient  · The patient has been instructed to call us about any new neurological problems or medication side effects  · Return to Clinic in 4 months

## 2019-09-23 NOTE — TELEPHONE ENCOUNTER
MSW contacted son Maria Dolores Johnson via telephone call  Discussed adult day care option (for care during the day) would like more information, interested in having patient going maybe 2x a week  Also interested in being referred to the office of aging for caregiver assistance program and meals at home program  Also interested in having information of churches that may have program/help for seniors  MSW informed patient that she will look into the information and will contact him  Maria Dolores Alex agreed to receive information via mail  [employer] [facility]/[employer]/meals_at_home html    [employer] [facility]/[employer]/cap html

## 2019-09-23 NOTE — PROGRESS NOTES
DEPARTMENT OF NEUROLOGICAL SCIENCES  [address] and [facility]        NEW PATIENT EVALUATION NOTE    Patient: Jaden Alejandra  Medical Record Number: # [id number]  YOB: 1935  Date of visit: 9/23/2019    Referring provider: Alf Byers DO    ASSESSMENT     Diagnoses for this encounter:  1  Late onset Alzheimer's disease without behavioral disturbance  Misc  Devices MISC    MRI brain NeuroQuant wo and w contrast    Ceruloplasmin    Copper Level    Magnesium    TSH, 3rd generation with Free T4 reflex    Vitamin B12    RPR, Rfx Qn RPR/Confirm TP    Comprehensive metabolic panel    CBC and differential   2  Recurrent falls  Misc  Devices MISC    MRI brain NeuroQuant wo and w contrast    Ceruloplasmin    Copper Level    Magnesium    TSH, 3rd generation with Free T4 reflex    Vitamin B12    RPR, Rfx Qn RPR/Confirm TP    Comprehensive metabolic panel    CBC and differential     Impression of this 82 yo lady with 5+ year history of progressive memory problems and safety concerns and more recent development of non-fluent aphasia, alexia with agraphia  Pt previously diagnosed as Alzheimer's disease and currently on memantine  Her son wishes to do everything possible to keep her safely home and not in a nursing facility  She has had several traumatic falls and bruising her hip, mainly because she has not been using her walker regularly  MMSE was 0/30 and pt's examination was significant for no bradykinesia and the aphasia noted before  No personality changes, outbursts or hallucinations that would suggest alternative dementias  Overall her condition would be mostly consistent with Alzheimer's type dementia albeit with suspected vascular lesions given development of aphasia  A primary progressive aphasia, also tauopathy, is likely due to the timing  Proceed as below  PLAN     · No recent or relevant laboratory results in last 3 months to review     · Order lab tests as above to r/o any reversible metabolic causes of dementia  · No recent or relevant neuroimaging results to review  Prior CT of head and c-spine for falls in 5/2019 showed severe degenerative changes but no cord pathology and normal for age intracranially  · MRI brain NeuroQuant   · She has already been to PT for the past three months  She will need hip huggers, available on [employer] or medical supply store  Will send for a script  · We will consult MSW Social Work to contact them about local resources for aides in [address] [place] [address]  They are interested in staying in their own home if possible  · She has never been on galantamine  We will start instead of galantamine 4mg tablets, to be taken according to the following: At any point if symptoms are controlled then can hold that dose without going up further  If feels any side effects (sedation) that are intolerable, then go back down to next lower dose and hold there, then call us  Alternative is topiramate, but has side effect of increasing paresthesia  Week 1:  Start on 0 5 tab twice a day  Week 2:  Take 1 tab in AM / 1 tab at bedtime  Week 3:  Take 1 5 tab twice a day  Week 4:  Take 2 tab twice a day  · Encouraged heavily to utilize the walker  · Encouraged to remain both physically and mentally active, including crossword puzzles, brain teasers  Online resources at [employer] for cognitive training games for free  · Thank you very much for sending me this interesting patient  · The patient has been instructed to call us about any new neurological problems or medication side effects  · Return to Clinic in 4 months  A total of 60 minutes were spent face-to-face with this patient, of which 25% was spent on counseling and coordination of care  We discussed the natural history of the patient's condition, differential diagnosis, level of diagnostic certainty, treatment alternatives and their side effects and possible complications       HISTORY OF PRESENT ILLNESS:     Ms Evelyne Moralez is a 81 y o  right handed female who has been [facility] the [facility] [address] [place] [address] for evaluation of Alzheimer's disease  The patient was accompanied today  History was obtained from patient and son  Referred from PCP    Main bothersome neurological symptoms today are:   1  Falls  2  Memory     Her son says she started having memory problems in 2014 but this year 2019 she has been having trouble making a coherent sentence  She has called family members the wrong name  She has trouble with directions and navigation  She has not driven in 3+ years  +visual hallucinations and delusions with people in her room  She has been followed previously by her PCP for the diagnosis of Alzheimer's dementia, diagnosed [id number]  As of July 2019 she was having multiple falls within a week, incontinent of urine requiring adult diapers, and unable to coherently speak  His son was interested in home nursing aides and vowed not to consider placement in nursing homes  She has been sent to PT for the falls  PCP had stopped her memantine due to concerns of causing falls  She has been on memantine for years but had not felt it was making a noticeable difference  She had been on donepezil but she had severe diarrhea from it  She has aides coming in 3-4 hrs a night, and her own son does stay with her when he's not working  He is in the process of hiring another aide for the mornings to help pt get dressed and in the shower  Her son is concerned about her falls  She has difficulty ascending steps and fell before on even low steps  Average frequency of fall is 2x a week, mainly due to uneven surfaces  No major shifts in personality except being more passive  Current Relevant Medications: see below  Living Situation + ADLs:  She lives with son, who is there most of the day except while doing yardwork  Pt used to do clerical work     Durable Power of :  Daughter and son both   Living Will:  yes  Family History: Number of First Degree Relatives With Parkinsonism/Dementia: her own grandmother  One sister with dementia       REVIEW OF PAST MEDICAL, SOCIAL AND FAMILY HISTORY:  This is the list of problems as per our Medical Records:    Patient Active Problem List    Diagnosis Date Noted    Mild cognitive impairment with memory loss 08/28/2019    Falls frequently 07/31/2019    Recurrent falls 05/29/2019    Urinary frequency 05/29/2019    Encounter for smoking cessation counseling 05/29/2019    Fall at home, initial encounter 05/26/2019    KEYONNA (acute kidney injury) ([facility] [address] ) 05/26/2019    Toe cyanosis 05/10/2019    Acute non-recurrent pansinusitis 01/23/2019    Urinary incontinence due to cognitive impairment 08/24/2018    Onychomycosis of toenail 04/25/2018    Late onset Alzheimer's disease without behavioral disturbance 01/24/2018    Osteoarthritis of left knee 08/25/2017    Dementia 03/17/2016    Early onset Alzheimer's dementia without behavioral disturbance 12/04/2015    Claudication of lower extremity ([facility] [address] ) 05/14/2015    Knee pain 01/15/2015    Memory loss 07/14/2014    Increased frequency of urination 04/10/2014    Hyperlipidemia 02/19/2014    Benign essential hypertension 11/27/2013     Past Medical History:   Diagnosis Date    Alzheimer disease 2018    Arthritis     Dementia     Encounter for screening for osteoporosis     HL (hearing loss)     Hyperlipidemia     Hypertension     Sciatica 2018      Past Surgical History:   Procedure Laterality Date    SPINE SURGERY  1980    lumbar surgery for pinched nerve         Allergies   Allergen Reactions    Other      Permanent hair dye        Outpatient Encounter Medications as of 9/23/2019   Medication Sig Dispense Refill    aspirin 81 mg chewable tablet Chew 81 mg daily      atorvastatin (LIPITOR) 80 mg tablet take 1 tablet by mouth once daily 90 tablet 3    BYSTOLIC 10 MG tablet       fluticasone (FLONASE) 50 mcg/act nasal spray 2 sprays into each nostril daily 16 g 0    lisinopril (ZESTRIL) 10 mg tablet Take 1 tablet (10 mg total) by mouth daily 30 tablet 5    naproxen (NAPROSYN) 375 mg tablet Take 1 tablet by mouth every 12 (twelve) hours as needed      Misc  Devices MISC by Does not apply route daily Hip lindagger  Available at  [employer] 1 each 0    nicotine (NICODERM CQ) 14 mg/24hr TD 24 hr patch Place 1 patch on the skin daily (Patient not taking: Reported on 2019) 28 patch 0     No facility-administered encounter medications on file as of 2019  Social History     Tobacco Use    Smoking status: Former Smoker     Packs/day: 1 00     Years: 68 00     Pack years: 68 00     Start date: 2019     Last attempt to quit: 2019     Years since quittin 7    Smokeless tobacco: Never Used   Substance Use Topics    Alcohol use: Not Currently      Family History   Problem Relation Age of Onset    Alzheimer's disease Mother     Heart disease Father     Alzheimer's disease Family     Heart disease Family     Alzheimer's disease Sister     Stroke Sister         REVIEW OF SYSTEMS:  The patient has entered data on an intake form regarding present illness, past medical and surgical history, medications, allergies, family and social history, and a full review of 14 systems  I have reviewed this form with the patient, and all the relevant information has been included on this note  The full review of systems was negative except as stated in HPI and below  Constitutional: Negative  Negative for appetite change and fever  HENT: Negative  Negative for hearing loss, tinnitus, trouble swallowing and voice change  Eyes: Negative  Negative for photophobia and pain  Respiratory: Negative  Negative for shortness of breath  Cardiovascular: Negative  Negative for palpitations  Gastrointestinal: Negative  Negative for nausea and vomiting  Endocrine: Negative   Negative for cold intolerance and heat intolerance  Genitourinary: Negative  Negative for dysuria, frequency and urgency  Musculoskeletal: Positive for gait problem  Negative for myalgias and neck pain  Skin: Negative  Negative for rash  Allergic/Immunologic: Negative  Neurological: Negative for dizziness, tremors, seizures, syncope, facial asymmetry, speech difficulty, weakness, light-headedness, numbness and headaches  Positive for memory problems   Positive for forgetfulness   Hematological: Negative  Does not bruise/bleed easily  Psychiatric/Behavioral: Positive for confusion  Negative for hallucinations and sleep disturbance  PHYSICAL EXAMINATION:     Vital signs:  /63 (BP Location: Left arm, Patient Position: Sitting, Cuff Size: Standard)   Pulse 62   Ht 5' 2" (1 575 m)   Wt 67 6 kg (149 lb)   BMI 27 25 kg/m²     General:  Well-appearing, well nourished, pleasant patient in no acute distress  Mood and Fund of Knowledge are appropriate  Head:  Normocephalic, atraumatic  Oropharynx and conjunctiva are clear  Speech  +hypophonia, no bradylalia  +Nonfluent aphasia with paraphasic errors and more often replacing words with non-sensical sounds  +Difficulty with repeating  No scanning speech  Language: Comprehension intact  Neck:  Supple, strong 5/5 forward flexion and retroflexion  Extremities: Range of motion is normal       Cognitive and Mental Exam:  She is unable to verbalize or indicate the correct answer for year, month, season, city, state and town  She was able to partially read "close your eyes" out loud but with paraphasic error and then unable to follow the command  She was unable to copy two intersecting polygons  She showed her R thumb when asked for her left thumb  She cannot comprehend to touch her left ear  She is unable to repeat ("bisor buts")  She cannot name a button nor a pair of glasses  MMSE of 0 / 30 pts    Cranial Nerves:  CN II:  Funduscopic examination reveals no papilledema     Direct and consensual light reflexes were equally reactive to light symmetrically  No afferent pupillary defect   Visual fields are full to confrontation  CN III / IV / VI: Extraocular movements were full, with normal pursuit and saccades  CN V:   Facial sensation to light touch was intact  CN VII: Face is symmetric with normal strength  CN VIII: Hearing was assessed using the Calibrated Finger Rub Auditory Screening Test (CALFRAST) and was not abnormal (Better than CALFRAST-Strong-70)  CN X:   Palate is up going bilaterally and symmetrically  CN XI:  Neck muscles are strong  CN XII: Tongue protrusion is at midline with normal movements  No dysarthria  Motor:    Dystonia: none  Dyskinesia: none  Myoclonus: present, typically in the R face and R arm  Chorea: none  Tics: none      UPDRSIII                Time since last dose:    9/23/19      Speech  2     Facial Expression  3    Postural Tremor (Right) 0    Postural Tremor (Left) 0    Kinetic Tremor (Right)  0    Kinetic Tremor (Left)  0    Rest tremor amplitude RUE 0    Rest tremor amplitude LUE 0    Rest tremor amplitude RLE 0    Rest tremor amplitude LLE 0    Lip/Jaw Tremor  0    Consistency of tremor 0    Finger Taps (Right)   -    Finger Taps (Left)  -    Hand Movement (Right)  -    Hand Movement (Left)   -    Pronation/Supination (Right)  -    Pronation/Supination (Left)   -    Toe Tapping (Right) -    Toe Tapping (Left) -    Leg Agility (Right)  -    Leg Agility (Left)   -     Rigidity - Neck  -     Rigidity - Upper Extremity (Right)  -      Rigidity - Upper Extremity (Left)   -     Rigidity - Lower Extremity (Right)  -    Rigidity - Lower Extremity (Left)   -    Arising from Chair   0      Gait   0     Freezing of Gait 0     Postural Stability  -     Posture 0     Global spontaneity of movement 0       -------------------------------------------------------------------------------------    Muscle Strength Right Left  Muscle Strength Right Left Deltoid 5/5 5/5  Hip Adductors 5/5 5/5   Biceps 5/5 5/5  Hip Abductors 5/5 5/5   Triceps 5/5 5/5  Knee Extensors 5/5 5/5   Wrist Extensors 5/5 5/5  Knee Flexors 5/5 5/5   Wrist Flexors 5/5 5/5  Ankle Extensors 5/5 5/5    5/5 5/5  Ankle Flexors 5/5 5/5   Finger Abductors 5/5 5/5       Hip Flexors 5/5 5/5   Hip Extensors 5/5 5/5     Coordination:  Finger-to-nose-finger: normal     Gait:  Able to stand without assistance from chair, stable forward strides, no bradykinesia or tremor, normal turns and steppage  Reflexes:    Right Left   Biceps 2/4 2/4   Brachioradialis 2/4 2/4   Triceps 2/4 2/4   Knee 2/4 2/4   Ankle 2/4 2/4      Plantar cutaneous reflex:  Right: flexor  Left: flexor      REVIEW OF ANCILLARY TESTS:   Results for orders placed or performed during the hospital encounter of 05/26/19   CT head without contrast    Narrative    CT BRAIN - WITHOUT CONTRAST    INDICATION:   fall  COMPARISON:  CT brain 11/27/2018    TECHNIQUE:  CT examination of the brain was performed  In addition to axial images, coronal 2D reformatted images were created and submitted for interpretation  Radiation dose length product (DLP) for this visit:  972 18 mGy-cm   This examination, like all CT scans performed in the [facility], was performed utilizing techniques to minimize radiation dose exposure, including the use of iterative   reconstruction and automated exposure control  IMAGE QUALITY:  Diagnostic  FINDINGS:    PARENCHYMA: Decreased attenuation is noted in periventricular and subcortical white matter demonstrating an appearance that is statistically most likely to represent advanced microangiopathic change; this appearance is similar when compared to most   recent prior examination  No CT signs of acute infarction  No intracranial mass, mass effect or midline shift  No acute parenchymal hemorrhage  VENTRICLES AND EXTRA-AXIAL SPACES:  Normal for the patient's age      VISUALIZED ORBITS AND PARANASAL SINUSES:  Unremarkable  CALVARIUM AND EXTRACRANIAL SOFT TISSUES:  Normal       Impression    No acute intracranial abnormality              Workstation performed: [id number]

## 2019-09-23 NOTE — LETTER
September 23, 2019     Serenity Barrientos, [address] [place] [address]    Patient: Arianna Mcdowell   YOB: 1935   Date of Visit: 9/23/2019       Dear Dr Jose Oneill: Thank you for referring Harriet Valadez to me for evaluation  Below are my notes for this consultation  If you have questions, please do not hesitate to call me  I look forward to following your patient along with you  Sincerely,        Kenji Patterson MD        CC: No Recipients  Silvestre Swapnil  9/23/2019 12:52 PM  Sign at close encounter  Review of Systems   Constitutional: Negative  Negative for appetite change and fever  HENT: Negative  Negative for hearing loss, tinnitus, trouble swallowing and voice change  Eyes: Negative  Negative for photophobia and pain  Respiratory: Negative  Negative for shortness of breath  Cardiovascular: Negative  Negative for palpitations  Gastrointestinal: Negative  Negative for nausea and vomiting  Endocrine: Negative  Negative for cold intolerance and heat intolerance  Genitourinary: Negative  Negative for dysuria, frequency and urgency  Musculoskeletal: Positive for gait problem  Negative for myalgias and neck pain  Skin: Negative  Negative for rash  Allergic/Immunologic: Negative  Neurological: Negative for dizziness, tremors, seizures, syncope, facial asymmetry, speech difficulty, weakness, light-headedness, numbness and headaches  Positive for memory problems  Positive for forgetfulness     Hematological: Negative  Does not bruise/bleed easily  Psychiatric/Behavioral: Positive for confusion  Negative for hallucinations and sleep disturbance         Kenji Patterson MD  9/23/2019 10:00 PM  Sign at close encounter  [facility]        NEW PATIENT EVALUATION NOTE    Patient: Arianna Mcdowell  Medical Record Number: # [id number]  Date of Birth: 1935  Date of visit: 9/23/2019    Referring provider: Jose M Crabtree DO    ASSESSMENT     Diagnoses for this encounter:  1  Late onset Alzheimer's disease without behavioral disturbance  Misc  Devices MISC    MRI brain NeuroQuant wo and w contrast    Ceruloplasmin    Copper Level    Magnesium    TSH, 3rd generation with Free T4 reflex    Vitamin B12    RPR, Rfx Qn RPR/Confirm TP    Comprehensive metabolic panel    CBC and differential   2  Recurrent falls  Misc  Devices MISC    MRI brain NeuroQuant wo and w contrast    Ceruloplasmin    Copper Level    Magnesium    TSH, 3rd generation with Free T4 reflex    Vitamin B12    RPR, Rfx Qn RPR/Confirm TP    Comprehensive metabolic panel    CBC and differential     Impression of this 82 yo lady with 5+ year history of progressive memory problems and safety concerns and more recent development of non-fluent aphasia, alexia with agraphia  Pt previously diagnosed as Alzheimer's disease and currently on memantine  Her son wishes to do everything possible to keep her safely home and not in a nursing facility  She has had several traumatic falls and bruising her hip, mainly because she has not been using her walker regularly  MMSE was 0/30 and pt's examination was significant for no bradykinesia and the aphasia noted before  No personality changes, outbursts or hallucinations that would suggest alternative dementias  Overall her condition would be mostly consistent with Alzheimer's type dementia albeit with suspected vascular lesions given development of aphasia  A primary progressive aphasia, also tauopathy, is likely due to the timing  Proceed as below  PLAN     · No recent or relevant laboratory results in last 3 months to review  · Order lab tests as above to r/o any reversible metabolic causes of dementia  · No recent or relevant neuroimaging results to review   Prior CT of head and c-spine for falls in 5/2019 showed severe degenerative changes but no cord pathology and normal for age intracranially  · MRI brain NeuroQuant   · She has already been to PT for the past three months  She will need hip huggers, available on [employer] or medical supply store  Will send for a script  · We will consult [employer] to contact them about local resources for aides in [place]  They are interested in staying in their own home if possible  · She has never been on galantamine  We will start instead of galantamine 4mg tablets, to be taken according to the following: At any point if symptoms are controlled then can hold that dose without going up further  If feels any side effects (sedation) that are intolerable, then go back down to next lower dose and hold there, then call us  Alternative is topiramate, but has side effect of increasing paresthesia  Week 1:  Start on 0 5 tab twice a day  Week 2:  Take 1 tab in AM / 1 tab at bedtime  Week 3:  Take 1 5 tab twice a day  Week 4:  Take  2 tab twice a day  · Encouraged heavily to utilize the walker  · Encouraged to remain both physically and mentally active, including crossword puzzles, brain teasers  Online resources at [employer] for cognitive training games for free  · Thank you very much for sending me this interesting patient  · The patient has been instructed to call us about any new neurological problems or medication side effects  · Return to Clinic in 4 months  A total of 60 minutes were spent face-to-face with this patient, of which 25% was spent on counseling and coordination of care  We discussed the natural history of the patient's condition, differential diagnosis, level of diagnostic certainty, treatment alternatives and their side effects and possible complications  HISTORY OF PRESENT ILLNESS:     Ms Earnest Beyer is a 81 y o  right handed female who has been [facility] the [facility] [address] [place] [address] for evaluation of Alzheimer's disease  The patient was accompanied today   History was obtained from patient and son  Referred from PCP    Main bothersome neurological symptoms today are:   1  Falls  2  Memory     Her son says she started having memory problems in 2014 but this year 2019 she has been having trouble making a coherent sentence  She has called family members the wrong name  She has trouble with directions and navigation  She has not driven in 3+ years  +visual hallucinations and delusions with people in her room  She has been followed previously by her PCP for the diagnosis of Alzheimer's dementia, diagnosed [id number]  As of July 2019 she was having multiple falls within a week, incontinent of urine requiring adult diapers, and unable to coherently speak  His son was interested in home nursing aides and vowed not to consider placement in nursing homes  She has been sent to PT for the falls  PCP had stopped her memantine due to concerns of causing falls  She has been on memantine for years but had not felt it was making a noticeable difference  She had been on donepezil but she had severe diarrhea from it  She has aides coming in 3-4 hrs a night, and her own son does stay with her when he's not working  He is in the process of hiring another aide for the mornings to help pt get dressed and in the shower  Her son is concerned about her falls  She has difficulty ascending steps and fell before on even low steps  Average frequency of fall is 2x a week, mainly due to uneven surfaces  No major shifts in personality except being more passive  Current Relevant Medications: see below  Living Situation + ADLs:  She lives with son, who is there most of the day except while doing yardwork  Pt used to do clerical work  Durable Power of :  Daughter and son both   Living Will:  yes  Family History: Number of First Degree Relatives With Parkinsonism/Dementia: her own grandmother  One sister with dementia       REVIEW OF PAST MEDICAL, SOCIAL AND FAMILY HISTORY:  This is the list of problems as per our Medical Records:    Patient Active Problem List    Diagnosis Date Noted    Mild cognitive impairment with memory loss 08/28/2019    Falls frequently 07/31/2019    Recurrent falls 05/29/2019    Urinary frequency 05/29/2019    Encounter for smoking cessation counseling 05/29/2019    Fall at home, initial encounter 05/26/2019    KEYONNA (acute kidney injury) ([facility] [facility] ) 05/26/2019    Toe cyanosis 05/10/2019    Acute non-recurrent pansinusitis 01/23/2019    Urinary incontinence due to cognitive impairment 08/24/2018    Onychomycosis of toenail 04/25/2018    Late onset Alzheimer's disease without behavioral disturbance 01/24/2018    Osteoarthritis of left knee 08/25/2017    Dementia 03/17/2016    Early onset Alzheimer's dementia without behavioral disturbance 12/04/2015    Claudication of lower extremity ([facility] [address] ) 05/14/2015    Knee pain 01/15/2015    Memory loss 07/14/2014    Increased frequency of urination 04/10/2014    Hyperlipidemia 02/19/2014    Benign essential hypertension 11/27/2013     Past Medical History:   Diagnosis Date    Alzheimer disease 2018    Arthritis     Dementia     Encounter for screening for osteoporosis     HL (hearing loss)     Hyperlipidemia     Hypertension     Sciatica 2018      Past Surgical History:   Procedure Laterality Date    SPINE SURGERY  1980    lumbar surgery for pinched nerve         Allergies   Allergen Reactions    Other      Permanent hair dye        Outpatient Encounter Medications as of 9/23/2019   Medication Sig Dispense Refill    aspirin 81 mg chewable tablet Chew 81 mg daily      atorvastatin (LIPITOR) 80 mg tablet take 1 tablet by mouth once daily 90 tablet 3    BYSTOLIC 10 MG tablet       fluticasone (FLONASE) 50 mcg/act nasal spray 2 sprays into each nostril daily 16 g 0    lisinopril (ZESTRIL) 10 mg tablet Take 1 tablet (10 mg total) by mouth daily 30 tablet 5    naproxen (NAPROSYN) 375 mg tablet Take 1 tablet by mouth every 12 (twelve) hours as needed      Misc  Devices MISC by Does not apply route daily Hip hugger  Available at  [employer] 1 each 0    nicotine (NICODERM CQ) 14 mg/24hr TD 24 hr patch Place 1 patch on the skin daily (Patient not taking: Reported on 2019) 28 patch 0     No facility-administered encounter medications on file as of 2019  Social History     Tobacco Use    Smoking status: Former Smoker     Packs/day: 1 00     Years: 68 00     Pack years: 68 00     Start date: 2019     Last attempt to quit: 2019     Years since quittin 7    Smokeless tobacco: Never Used   Substance Use Topics    Alcohol use: Not Currently      Family History   Problem Relation Age of Onset    Alzheimer's disease Mother     Heart disease Father     Alzheimer's disease Family     Heart disease Family     Alzheimer's disease Sister     Stroke Sister         REVIEW OF SYSTEMS:  The patient has entered data on an intake form regarding present illness, past medical and surgical history, medications, allergies, family and social history, and a full review of 14 systems  I have reviewed this form with the patient, and all the relevant information has been included on this note  The full review of systems was negative except as stated in HPI and below  Constitutional: Negative  Negative for appetite change and fever  HENT: Negative  Negative for hearing loss, tinnitus, trouble swallowing and voice change  Eyes: Negative  Negative for photophobia and pain  Respiratory: Negative  Negative for shortness of breath  Cardiovascular: Negative  Negative for palpitations  Gastrointestinal: Negative  Negative for nausea and vomiting  Endocrine: Negative  Negative for cold intolerance and heat intolerance  Genitourinary: Negative  Negative for dysuria, frequency and urgency  Musculoskeletal: Positive for gait problem  Negative for myalgias and neck pain  Skin: Negative  Negative for rash     Allergic/Immunologic: Negative  Neurological: Negative for dizziness, tremors, seizures, syncope, facial asymmetry, speech difficulty, weakness, light-headedness, numbness and headaches  Positive for memory problems   Positive for forgetfulness   Hematological: Negative  Does not bruise/bleed easily  Psychiatric/Behavioral: Positive for confusion  Negative for hallucinations and sleep disturbance  PHYSICAL EXAMINATION:     Vital signs:  /63 (BP Location: Left arm, Patient Position: Sitting, Cuff Size: Standard)   Pulse 62   Ht 5' 2" (1 575 m)   Wt 67 6 kg (149 lb)   BMI 27 25 kg/m²      General:  Well-appearing, well nourished, pleasant patient in no acute distress  Mood and Fund of Knowledge are appropriate  Head:  Normocephalic, atraumatic  Oropharynx and conjunctiva are clear  Speech  +hypophonia, no bradylalia  +Nonfluent aphasia with paraphasic errors and more often replacing words with non-sensical sounds  +Difficulty with repeating  No scanning speech  Language: Comprehension intact  Neck:  Supple, strong 5/5 forward flexion and retroflexion  Extremities: Range of motion is normal       Cognitive and Mental Exam:  She is unable to verbalize or indicate the correct answer for year, month, season, city, state and town  She was able to partially read "close your eyes" out loud but with paraphasic error and then unable to follow the command  She was unable to copy two intersecting polygons  She showed her R thumb when asked for her left thumb  She cannot comprehend to touch her left ear  She is unable to repeat ("bisor buts")  She cannot name a button nor a pair of glasses  MMSE of 0 / 30 pts    Cranial Nerves:  CN II:  Funduscopic examination reveals no papilledema  Direct and consensual light reflexes were equally reactive to light symmetrically  No afferent pupillary defect   Visual fields are full to confrontation     CN III / IV / VI: Extraocular movements were full, with normal pursuit and saccades  CN V:   Facial sensation to light touch was intact  CN VII: Face is symmetric with normal strength  CN VIII: Hearing was assessed using the Calibrated Finger Rub Auditory Screening Test (CALFRAST) and was not abnormal (Better than CALFRAST-Strong-70)  CN X:   Palate is up going bilaterally and symmetrically  CN XI:  Neck muscles are strong  CN XII: Tongue protrusion is at midline with normal movements  No dysarthria  Motor:    Dystonia: none  Dyskinesia: none  Myoclonus: present, typically in the R face and R arm  Chorea: none  Tics: none      UPDRSIII                Time since last dose:    9/23/19      Speech  2     Facial Expression  3    Postural Tremor (Right) 0    Postural Tremor (Left) 0    Kinetic Tremor (Right)  0    Kinetic Tremor (Left)  0    Rest tremor amplitude RUE 0    Rest tremor amplitude LUE 0    Rest tremor amplitude RLE 0    Rest tremor amplitude LLE 0    Lip/Jaw Tremor  0    Consistency of tremor 0    Finger Taps (Right)   -    Finger Taps (Left)  -    Hand Movement (Right)  -    Hand Movement (Left)   -    Pronation/Supination (Right)  -    Pronation/Supination (Left)   -    Toe Tapping (Right) -    Toe Tapping (Left) -    Leg Agility (Right)  -    Leg Agility (Left)   -     Rigidity - Neck  -     Rigidity - Upper Extremity (Right)  -      Rigidity - Upper Extremity (Left)   -     Rigidity - Lower Extremity (Right)  -    Rigidity - Lower Extremity (Left)   -    Arising from Chair   0      Gait    0     Freezing of Gait 0     Postural Stability  -     Posture 0     Global spontaneity of movement 0       -------------------------------------------------------------------------------------    Muscle Strength Right Left  Muscle Strength Right Left   Deltoid 5/5 5/5  Hip Adductors 5/5 5/5   Biceps 5/5 5/5  Hip Abductors 5/5 5/5   Triceps 5/5 5/5  Knee Extensors 5/5 5/5   Wrist Extensors 5/5 5/5  Knee Flexors 5/5 5/5   Wrist Flexors 5/5 5/5  Ankle Extensors 5/5 5/5  5/5 5/5  Ankle Flexors 5/5 5/5   Finger Abductors 5/5 5/5       Hip Flexors 5/5 5/5   Hip Extensors 5/5 5/5     Coordination:  Finger-to-nose-finger: normal     Gait:  Able to stand without assistance from chair, stable forward strides, no bradykinesia or tremor, normal turns and steppage  Reflexes:    Right Left   Biceps 2/4 2/4   Brachioradialis 2/4 2/4   Triceps 2/4 2/4   Knee 2/4 2/4   Ankle 2/4 2/4      Plantar cutaneous reflex:  Right: flexor  Left: flexor      REVIEW OF ANCILLARY TESTS:   Results for orders placed or performed during the hospital encounter of 05/26/19   CT head without contrast    Narrative    CT BRAIN - WITHOUT CONTRAST    INDICATION:   fall  COMPARISON:  CT brain 11/27/2018    TECHNIQUE:  CT examination of the brain was performed  In addition to axial images, coronal 2D reformatted images were created and submitted for interpretation  Radiation dose length product (DLP) for this visit:  972 18 mGy-cm   This examination, like all CT scans performed in the [facility], was performed utilizing techniques to minimize radiation dose exposure, including the use of iterative   reconstruction and automated exposure control  IMAGE QUALITY:  Diagnostic  FINDINGS:    PARENCHYMA: Decreased attenuation is noted in periventricular and subcortical white matter demonstrating an appearance that is statistically most likely to represent advanced microangiopathic change; this appearance is similar when compared to most   recent prior examination  No CT signs of acute infarction  No intracranial mass, mass effect or midline shift  No acute parenchymal hemorrhage  VENTRICLES AND EXTRA-AXIAL SPACES:  Normal for the patient's age  VISUALIZED ORBITS AND PARANASAL SINUSES:  Unremarkable  CALVARIUM AND EXTRACRANIAL SOFT TISSUES:  Normal       Impression    No acute intracranial abnormality              Workstation performed: [id number]

## 2019-09-24 ENCOUNTER — OFFICE VISIT (OUTPATIENT)
Dept: PHYSICAL THERAPY | Facility: CLINIC | Age: 84
End: 2019-09-24
Payer: MEDICARE

## 2019-09-24 ENCOUNTER — TELEPHONE (OUTPATIENT)
Dept: NEUROLOGY | Facility: CLINIC | Age: 84
End: 2019-09-24

## 2019-09-24 DIAGNOSIS — G30.1 LATE ONSET ALZHEIMER'S DISEASE WITHOUT BEHAVIORAL DISTURBANCE (HCC): ICD-10-CM

## 2019-09-24 DIAGNOSIS — F02.80 LATE ONSET ALZHEIMER'S DISEASE WITHOUT BEHAVIORAL DISTURBANCE (HCC): ICD-10-CM

## 2019-09-24 DIAGNOSIS — R29.6 RECURRENT FALLS: Primary | ICD-10-CM

## 2019-09-24 PROCEDURE — 97530 THERAPEUTIC ACTIVITIES: CPT | Performed by: PHYSICAL THERAPIST

## 2019-09-24 PROCEDURE — 97110 THERAPEUTIC EXERCISES: CPT | Performed by: PHYSICAL THERAPIST

## 2019-09-24 RX ORDER — GALANTAMINE HYDROBROMIDE 8 MG/1
TABLET, FILM COATED ORAL
Qty: 60 TABLET | Refills: 0 | Status: SHIPPED | OUTPATIENT
Start: 2019-09-24 | End: 2019-11-14 | Stop reason: SDUPTHER

## 2019-09-24 RX ORDER — GALANTAMINE HYDROBROMIDE 4 MG/1
TABLET, FILM COATED ORAL
Qty: 60 TABLET | Refills: 3 | Status: SHIPPED | OUTPATIENT
Start: 2019-09-24 | End: 2019-11-14

## 2019-09-24 NOTE — PROGRESS NOTES
Daily Note     Today's date: 2019  Patient name: Remy Gramajo  : 1935  MRN: [id number]  Referring provider: Khushi Reid DO  Dx:   Encounter Diagnosis     ICD-10-CM    1  Recurrent falls R29 6                   Subjective: Pt arrived with son today  No reports of falls since last visit          Objective: See treatment diary below   has a past medical history of Alzheimer disease, Arthritis, Dementia, Encounter for screening for osteoporosis, HL (hearing loss), Hyperlipidemia, Hypertension, and Sciatica       Nustep start of session- L3 x 6 min     -Step taps w/ colored discs for visual cueing , 6" and 12" step- 20 reps each foot, 1 UE support  -Ascending and descending  steps- 4" and 8" steps, 10 cycles each, 1-2 UE support  -Sit to stands holding 6# med ball and passing forward to therapist  -Sit to stand with core rotation passing 6# ball to therapist and to son   -Stepping over 2 trac - 10 reps  -Stepping over 6" hurdles (3 hurdles)- 15 feet today, 10 cycles       Assessment:   Patient tolerated treatment session fair today  Difficulty w/ stair negotiation, tries to place her foot sideways instead of straight  Improving w/ foot clearance over 6" hurdles although it is inconsistent  Contact guard required during entire session  Pt would benefit from continued PT for further balance training to reduce falls          Plan: Continue per plan of care  Progress simple/functional exercise as tolerated

## 2019-09-24 NOTE — TELEPHONE ENCOUNTER
Sending separate new scripts for     - Galantamine 4mg tablets  Quantity 60, 0 refills  - Galantamine 8mg tablets, quantity 60,  3 refills  Change titration schedule to:  Week 1:  Taking 1/2 tab BID of the 4mg tablets  Week 2:  Taking 1 tab BID of the 4mg tablets  Week 3:  Taking 1 tab BID of the 8mg tablet and stay here  At any point if symptoms are controlled then can hold that dose without going up further  If feels any side effects that are intolerable, then go to last known tolerated dose, then call us

## 2019-09-24 NOTE — TELEPHONE ENCOUNTER
Dr Carlton - Fax received from pharmacy stating insurance will only cover 1 tablet Twice a day (only covers 2 tablets daily)  Please adjust script/order may require 2 separate scripts with different dosages  Please advise how you would like to proceed, or send new script

## 2019-09-26 ENCOUNTER — OFFICE VISIT (OUTPATIENT)
Dept: PHYSICAL THERAPY | Facility: CLINIC | Age: 84
End: 2019-09-26
Payer: MEDICARE

## 2019-09-26 DIAGNOSIS — R29.6 RECURRENT FALLS: Primary | ICD-10-CM

## 2019-09-26 PROCEDURE — 97164 PT RE-EVAL EST PLAN CARE: CPT

## 2019-09-26 PROCEDURE — 97530 THERAPEUTIC ACTIVITIES: CPT

## 2019-09-26 NOTE — TELEPHONE ENCOUNTER
Spoke to Marah Negrete  Informed of changes to medication titration schedule  Verbalized understanding  No further questions or concerns

## 2019-09-26 NOTE — PROGRESS NOTES
Progress Note/Daily Note     Today's date: 2019  Patient name: Kamla Fernandez  : 1935  MRN: [id number]  Referring provider: Jina Borrego DO  Dx:   Encounter Diagnosis     ICD-10-CM    1  Recurrent falls R29 6        Start Time: 1200  Stop Time: 1300  Total time in clinic (min): 60 minutes    Subjective: Patient reported to the clinic today with her son, patient's son reports that she had a fall yesterday stepping up a step  Patient presented with a slight scrape on her R knee  Patient challenged with her balance continually  Objective: See treatment diary below   has a past medical history of Alzheimer disease, Arthritis, Dementia, Encounter for screening for osteoporosis, HL (hearing loss), Hyperlipidemia, Hypertension, and Sciatica  Outcome Measures  -TUG- 22 66 seconds- 2019  -TUG- 22 43 seconds- 2019    -5x sit to stand- 14 63 seconds- 2019  -5x sit to stand- 13 35 seconds- 2019    -30 second sit to stand- 10 reps-2019  -30 second sit to stand- 11 reps- 2019    -ANDERS- 45/56- 2019  -ANDERS- 45/56- 2019    -Gait Speed- 10 meters/18 5 second-  54 meters/second   -Gait Speed- 10 meters/16 75 second-  60 meters/second     -6 minute walk test- 475 feet no AD  -6 minute walk test- 525 feet no AD    Nu Step- 15 minutes L4 beginning of session- for cardiovascular endurance improvement and warm up    Step Ups- Forward and Laterally with Colored discs for visual cuing- 10 cycles of 10 feet each, close supervision to contact guard noted  STS-  20 reps, eyes closed, 20 reps holding 6 lb med ball   Carrying med ball- 10 lb med ball- 300 feet      STG 2-4 weeks  1  Pt will demonstrate safe, independent use of AD for ambulation on  level surfaces, curbs, stairs and perturb surfaces - NOT MET  2  Pt will be independent in early HEP  MET with family   3  Pt will independently rise from sitting every 15-20 minutes at home, and be active for 10-15 minutes   NOT MET    LTG 4-8 weeks  1  Pt will score at a safe level on standardized testing for dynamic/community mobility  NOT MET  2  Pt will improve narrow-based balance with eyes closed to 15 seconds seconds for improved safety with ADL's in low lighting  -NOT MET  3  Pt will be independent in advanced HEP - Partially MET  4  Improve 5XSTS score to 20 seconds to improve speed of transfers  MET   5  Improve TUG score to 15  seconds to improve speed of transfers and mobility  NOT MET  6  Improve gait speed to 0 78 M/sec during 10M Walk Test to improve speed and safety of mobility   (age norm) NOT MET  7  Pt will participate in a discussion about Wellness or [employer] attendance, vs Chair Yoga or Balance Class after DC from PT - NOT MET    Assessment:   Indra Pastrana is a 81year old female who demonstrates maintainance in objective measures over the past month (M[place]ullough, 5XSTS, 30 seconds sit to stand, 6 minute walk test, and gait speed ) Pt has trouble following simple commands but improves with repetition continually, minimal improvement noted from her last progress note, challenged due to her mental status  This may be attributed to her comorbidity of alzheimer's disease  She continues to demonstrate reduced step height and step length which reduce her safety, specifically with stair negotiation, which according to subjective report of son, patient has had an increase in falls in relationship to the steps, suffering noted fall per subjective  She occasionally displays poor foot placement and ineffective balance responses  Stepping over objects taller than 4" remains challenging for her, specifically 6" steps  Shuffling gait pattern particularly noted  SPC or rollator noted for usage, patient and son have not purchased  Due to maintenance of status, patient will be discharged to a maintenance program  Patient and son verbalized understanding and agreement with POC       Plan: Discharge

## 2019-10-04 DIAGNOSIS — I10 ESSENTIAL HYPERTENSION: ICD-10-CM

## 2019-10-05 RX ORDER — LISINOPRIL 10 MG/1
TABLET ORAL
Qty: 30 TABLET | Refills: 5 | Status: SHIPPED | OUTPATIENT
Start: 2019-10-05

## 2019-10-10 NOTE — TELEPHONE ENCOUNTER
MSW followed-up with patient's son this date at [phone]  Patient's son stated that he did receive a packet of information in the mail about community services  Patient's son stated that, from that list, he was able to hire a private duty aide to come in two hours a day  Patient's son reports he is paying privately for those services  MSW suggested that a referral be made to the [facility] on [facility] so that patient could be evaluated to see if she would qualify for any services through them  Patient's son stated that he never spoke with the Aging office directly, and would be agreeable to this writer making a referral to the [employer] on Aging  MSW phoned the [facility] on [facility] at [phone] and made referral  MSW provided patient's name and son's name/contact number  MSW was informed that Suzanne Villafana would be reaching out to him next week  MSW phoned patient's son to make him aware of same  MSW also provided the phone number to the [facility] on [facility] as [phone] in case he does not hear from them in a timely manner  The social work team will be available to patient/son as needed

## 2019-10-24 ENCOUNTER — TELEPHONE (OUTPATIENT)
Dept: FAMILY MEDICINE CLINIC | Facility: CLINIC | Age: 84
End: 2019-10-24

## 2019-10-24 NOTE — TELEPHONE ENCOUNTER
Dr Anusha Jules would like a call back, he is requesting a 24 hour Aid in the home, as Patient is up every 45 mins urinating more then normal  Pt needs assistants toileting and He is worried to give her a sleeping aid  Sha Love or Fermin Bledsoe can be reached at the number number listed below;      [phone]

## 2019-10-28 NOTE — TELEPHONE ENCOUNTER
Hi Dr Ben Tian has been working with the family so I will forward this to her   If she is unable I will assist

## 2019-10-29 ENCOUNTER — PATIENT OUTREACH (OUTPATIENT)
Dept: FAMILY MEDICINE CLINIC | Facility: CLINIC | Age: 84
End: 2019-10-29

## 2019-10-29 ENCOUNTER — TELEPHONE (OUTPATIENT)
Dept: NEUROLOGY | Facility: CLINIC | Age: 84
End: 2019-10-29

## 2019-10-29 DIAGNOSIS — G30.1 LATE ONSET ALZHEIMER'S DISEASE WITHOUT BEHAVIORAL DISTURBANCE (HCC): Primary | ICD-10-CM

## 2019-10-29 DIAGNOSIS — F02.80 LATE ONSET ALZHEIMER'S DISEASE WITHOUT BEHAVIORAL DISTURBANCE (HCC): Primary | ICD-10-CM

## 2019-10-29 DIAGNOSIS — F51.01 PRIMARY INSOMNIA: Primary | ICD-10-CM

## 2019-10-29 DIAGNOSIS — G47.00 INSOMNIA DISORDER RELATED TO KNOWN ORGANIC FACTOR: ICD-10-CM

## 2019-10-29 DIAGNOSIS — I10 ESSENTIAL HYPERTENSION: ICD-10-CM

## 2019-10-29 DIAGNOSIS — F05 DELIRIUM DUE TO ANOTHER MEDICAL CONDITION: ICD-10-CM

## 2019-10-29 RX ORDER — NEBIVOLOL HYDROCHLORIDE 5 MG/1
TABLET ORAL
Qty: 30 TABLET | Refills: 5 | Status: SHIPPED | OUTPATIENT
Start: 2019-10-29

## 2019-10-29 RX ORDER — LORAZEPAM 1 MG/1
1 TABLET ORAL
Qty: 2 TABLET | Refills: 0 | Status: SHIPPED | OUTPATIENT
Start: 2019-10-29

## 2019-10-29 NOTE — PROGRESS NOTES
OP CM called to pts son Mya Ling  He states he just hired a live in home health aide starting this past Sunday through [facility] [phone]   Mya uSshil states he just had back surgery and at this point is not able to help patient  Pts son still is not interested in assisted living or nursing home placement  OP CM will remain available

## 2019-10-29 NOTE — TELEPHONE ENCOUNTER
Called her back  Discussed about possibility of UTI and recommended starting there to get UA first  She can use the 5mg melatonin nightly first for a week, then 10mg nightly  If this does not work they will call us back and we will start trazodone 50mg qHS  They should proceed with MRI unless she has other issues   Writing script for prn Ativan just for the test

## 2019-10-29 NOTE — TELEPHONE ENCOUNTER
Patient daughter Fermin Bledsoe called wanting to speak to Dr Lisa Byers  She stated that Patient is currently on Galantamine 8 mg bid and is constantly up 3-4 times at night to urinate, put on lipstick, wanting get on the train etc  Patient also has been more agitated lately  Patient daughter and son wondering if there is a medication or if patient can take Melatonin to help patient sleep at night and to calm her down   They also wanted to know if it is necessary to get the MRI of the Brain done, they are afraid that the patient will be scared and uncomfortable with the test  Please give the daughter a call at [phone] or  Son [phone]

## 2019-10-30 RX ORDER — TRAZODONE HYDROCHLORIDE 50 MG/1
25 TABLET ORAL
Qty: 30 TABLET | Refills: 3 | Status: SHIPPED | OUTPATIENT
Start: 2019-10-30 | End: 2019-12-02

## 2019-10-30 NOTE — TELEPHONE ENCOUNTER
I sent in script for trazodone 50mg tablets  Take 1/2 tablet at bedtime for sleep, if not effective can go to 1 full tab  I have also placed an order for Urinalysis (collection cup at the lab itself), best to do all the blood work that we had ordered last month that hasn't been done yet if she's cooperative during the day

## 2019-11-12 ENCOUNTER — TELEPHONE (OUTPATIENT)
Dept: FAMILY MEDICINE CLINIC | Facility: CLINIC | Age: 84
End: 2019-11-12

## 2019-11-12 NOTE — TELEPHONE ENCOUNTER
DR Sandy Beck - SHE IS PT'S DAUGHTER  SHE WOULD LIKE A LETTER FROM  YOU STATING THAT PT NEEDS 24-7  IN HOME CARE  SHE WOULD LIKE TO SPEAK TO YOU ABOUT IT   SHE WOULD LIKE TO PICK THE LETTER UP TOMORROW

## 2019-11-14 DIAGNOSIS — G30.1 LATE ONSET ALZHEIMER'S DISEASE WITHOUT BEHAVIORAL DISTURBANCE (HCC): ICD-10-CM

## 2019-11-14 DIAGNOSIS — F02.80 LATE ONSET ALZHEIMER'S DISEASE WITHOUT BEHAVIORAL DISTURBANCE (HCC): ICD-10-CM

## 2019-11-14 RX ORDER — GALANTAMINE HYDROBROMIDE 8 MG/1
TABLET, FILM COATED ORAL
Qty: 60 TABLET | Refills: 4 | Status: SHIPPED | OUTPATIENT
Start: 2019-11-14

## 2019-11-18 NOTE — TELEPHONE ENCOUNTER
Nikos Ahumada, Patient's son came into the office, he would like to report that Patient is now walking out of the house flagging cars down asking for help  Also states that patient has not been sleeping walking the floors all night  He would like to know if she can be put on Anxiety medication to help with this   Nikos Brandyn can be reached at the number listed below;    [phone]

## 2019-12-02 ENCOUNTER — TELEPHONE (OUTPATIENT)
Dept: FAMILY MEDICINE CLINIC | Facility: CLINIC | Age: 84
End: 2019-12-02

## 2019-12-02 DIAGNOSIS — G47.00 INSOMNIA DISORDER RELATED TO KNOWN ORGANIC FACTOR: ICD-10-CM

## 2019-12-02 DIAGNOSIS — F02.80 LATE ONSET ALZHEIMER'S DISEASE WITHOUT BEHAVIORAL DISTURBANCE (HCC): ICD-10-CM

## 2019-12-02 DIAGNOSIS — G30.1 LATE ONSET ALZHEIMER'S DISEASE WITHOUT BEHAVIORAL DISTURBANCE (HCC): ICD-10-CM

## 2019-12-02 RX ORDER — TRAZODONE HYDROCHLORIDE 50 MG/1
TABLET ORAL
Qty: 45 TABLET | Refills: 5 | Status: SHIPPED | OUTPATIENT
Start: 2019-12-02 | End: 2019-12-12

## 2019-12-02 RX ORDER — TRAZODONE HYDROCHLORIDE 50 MG/1
TABLET ORAL
Qty: 30 TABLET | Refills: 3 | Status: SHIPPED | OUTPATIENT
Start: 2019-12-02 | End: 2019-12-02

## 2019-12-02 NOTE — TELEPHONE ENCOUNTER
Daughter called about pt not sleeping at night   She paces the floor and moves from chair to bed frequently   Her  says she sleeps about an hour from 9pm - 10pm  Daughter and son asking for something to help her sleep

## 2019-12-05 ENCOUNTER — TELEPHONE (OUTPATIENT)
Dept: NEUROLOGY | Facility: CLINIC | Age: 84
End: 2019-12-05

## 2019-12-05 DIAGNOSIS — F02.80 LATE ONSET ALZHEIMER'S DISEASE WITHOUT BEHAVIORAL DISTURBANCE (HCC): ICD-10-CM

## 2019-12-05 DIAGNOSIS — G47.00 INSOMNIA DISORDER RELATED TO KNOWN ORGANIC FACTOR: ICD-10-CM

## 2019-12-05 DIAGNOSIS — G30.1 LATE ONSET ALZHEIMER'S DISEASE WITHOUT BEHAVIORAL DISTURBANCE (HCC): ICD-10-CM

## 2019-12-05 DIAGNOSIS — F51.01 PRIMARY INSOMNIA: Primary | ICD-10-CM

## 2019-12-05 NOTE — TELEPHONE ENCOUNTER
Ok  Please increase to 150mg of trazodone at bedtime (3 tabs) for 3-4 days  This would be the maximum dose recommended for it  If it does nothing for her, then we'll cross titrate it with mirtazepine as next step according to the following:   Week 1:  Trazodone 100mg at bedtime + Mirtazepine 7 5mg at bedtime  Week 2:  Trazodone 50mg at bedtime + Mirtazepine 15mg at bedtime  Week 3:  Trazodone 25mg (1/2 tab) at bedtime + Mirtazepine 30mg at bedtime  Week 4:  Trazodone off / Mirtazepine 45mg at bedtime  I would like to make sure we have tried everything with trazodone first before making this next move   Thank you

## 2019-12-05 NOTE — TELEPHONE ENCOUNTER
Patient's daughter, Henrietta Marie stated the trazadone has not been working, the patient took 100 mg trazodone the last 2 nights and she is still up all night, she's concerned the patient will end up falling due to lack of sleep  She stated she knows the patient doesn't have a UTI  She is requesting a different med be sent to help patient sleep  Please advise  [phone]  Okay to leave a message

## 2019-12-05 NOTE — TELEPHONE ENCOUNTER
Andrae Roth made aware  She will call back if increased dose of trazadone is ineffective    If they need to transition to mirtazepine we will need to review below instructions with them

## 2019-12-12 RX ORDER — MIRTAZAPINE 15 MG/1
TABLET, FILM COATED ORAL
Qty: 90 TABLET | Refills: 3 | Status: SHIPPED | OUTPATIENT
Start: 2019-12-12

## 2019-12-12 NOTE — TELEPHONE ENCOUNTER
Toby Patel called back  The 150mg of trazodone was not effective for the patient  She continues to be up all night and not sleep  Toby Patel would like to now try the other medication  Titration instructions provided in detail for the mirtazepine  Toby Patel wrote these down and verbally responded back with instructions to confirm  Dr Nadia Carney send the prescription for mirtazepine to the pharmacy

## 2019-12-23 ENCOUNTER — TELEPHONE (OUTPATIENT)
Dept: FAMILY MEDICINE CLINIC | Facility: CLINIC | Age: 84
End: 2019-12-23

## 2019-12-23 NOTE — TELEPHONE ENCOUNTER
Patient son called mom's legs hurt slight swelling in right leg   He has her laying down wants to know if she can take Naprosyn told it would be ok to try aleve which is naproxen she has had before according to med list  If no relief to call office and we will schedule apt

## 2019-12-23 NOTE — TELEPHONE ENCOUNTER
HE IS PT'S SON  PT HAS LEG PAIN  HE WANTED APPT FOR PT WITH DR RENNER TODAY  I TOLD HIM DR RENNER IS NOT SEEING PT'S TODAY, I COULD PUT HER IN WITH SOMEONE ELSE  HE REFUSED APPT  HE WANTS TO KNOW IF HE CAN GIVE PT  NAPROXYN  HE WOULD LIKE CALL BACK

## 2020-01-02 DIAGNOSIS — E78.2 MIXED HYPERLIPIDEMIA: ICD-10-CM

## 2020-01-02 RX ORDER — ATORVASTATIN CALCIUM 80 MG/1
TABLET, FILM COATED ORAL
Qty: 90 TABLET | Refills: 3 | Status: SHIPPED | OUTPATIENT
Start: 2020-01-02

## 2020-01-16 ENCOUNTER — TELEPHONE (OUTPATIENT)
Dept: FAMILY MEDICINE CLINIC | Facility: CLINIC | Age: 85
End: 2020-01-16

## 2020-01-22 ENCOUNTER — OFFICE VISIT (OUTPATIENT)
Dept: FAMILY MEDICINE CLINIC | Facility: CLINIC | Age: 85
End: 2020-01-22
Payer: MEDICARE

## 2020-01-22 VITALS
SYSTOLIC BLOOD PRESSURE: 110 MMHG | OXYGEN SATURATION: 92 % | HEART RATE: 66 BPM | WEIGHT: 151 LBS | DIASTOLIC BLOOD PRESSURE: 68 MMHG | BODY MASS INDEX: 27.79 KG/M2 | HEIGHT: 62 IN | RESPIRATION RATE: 18 BRPM | TEMPERATURE: 97.8 F

## 2020-01-22 DIAGNOSIS — H05.012 ORBITAL CELLULITIS ON LEFT: Primary | ICD-10-CM

## 2020-01-22 PROCEDURE — 99213 OFFICE O/P EST LOW 20 MIN: CPT | Performed by: FAMILY MEDICINE

## 2020-01-22 RX ORDER — CEPHALEXIN 500 MG/1
500 CAPSULE ORAL EVERY 12 HOURS SCHEDULED
Qty: 14 CAPSULE | Refills: 0 | Status: ON HOLD | OUTPATIENT
Start: 2020-01-22 | End: 2020-01-24

## 2020-01-22 NOTE — PATIENT INSTRUCTIONS
Pt will be started on Cephalexin 500 mg bid for one week  Pt will f/u with me on Friday, 1/24/20 at 11:30 AM to check the left eye  Pt's son to call if redness rapidly spreads before Friday

## 2020-01-22 NOTE — PROGRESS NOTES
Assessment/Plan:    No problem-specific Assessment & Plan notes found for this encounter  Diagnoses and all orders for this visit:    Orbital cellulitis on left        Subjective:      Patient ID: Elijah Cristobal is a 81 y o  female  This is a 80 yo female who onset of a red rash around her left eye two days ago  The rash is spreading over the two days  The pt's son denies left eye discharge  The pt has moderately severe dementia and has difficulty communicating  Her son denies other problems      The following portions of the patient's history were reviewed and updated as appropriate: allergies, current medications, past family history, past medical history, past social history, past surgical history and problem list     Review of Systems   Constitutional: Negative for fever  HENT: Positive for ear discharge  Eyes: Positive for redness  Negative for itching  Psychiatric/Behavioral: Positive for confusion  Objective:      /68 (BP Location: Left arm, Patient Position: Sitting, Cuff Size: Standard)   Pulse 66   Temp 97 8 °F (36 6 °C)   Resp 18   Ht 5' 2" (1 575 m)   Wt 68 5 kg (151 lb)   SpO2 92%   BMI 27 62 kg/m²          Physical Exam   Constitutional: She appears well-developed and well-nourished  Eyes: Left eye exhibits no discharge     Left eye conjunctival redness   Psychiatric:   Judgment and thought impaired

## 2020-01-24 ENCOUNTER — OFFICE VISIT (OUTPATIENT)
Dept: FAMILY MEDICINE CLINIC | Facility: CLINIC | Age: 85
End: 2020-01-24
Payer: MEDICARE

## 2020-01-24 ENCOUNTER — HOSPITAL ENCOUNTER (EMERGENCY)
Facility: HOSPITAL | Age: 85
End: 2020-01-24
Attending: EMERGENCY MEDICINE
Payer: MEDICARE

## 2020-01-24 ENCOUNTER — APPOINTMENT (EMERGENCY)
Dept: RADIOLOGY | Facility: HOSPITAL | Age: 85
End: 2020-01-24
Payer: MEDICARE

## 2020-01-24 ENCOUNTER — HOSPITAL ENCOUNTER (INPATIENT)
Facility: HOSPITAL | Age: 85
LOS: 10 days | Discharge: NURSING, REHAB OR LONG-TERM CARE | DRG: 603 | End: 2020-02-03
Attending: INTERNAL MEDICINE | Admitting: INTERNAL MEDICINE
Payer: MEDICARE

## 2020-01-24 VITALS
RESPIRATION RATE: 20 BRPM | HEART RATE: 70 BPM | WEIGHT: 151 LBS | OXYGEN SATURATION: 93 % | TEMPERATURE: 99 F | SYSTOLIC BLOOD PRESSURE: 171 MMHG | DIASTOLIC BLOOD PRESSURE: 82 MMHG | BODY MASS INDEX: 27.62 KG/M2

## 2020-01-24 VITALS
OXYGEN SATURATION: 96 % | WEIGHT: 151 LBS | RESPIRATION RATE: 16 BRPM | HEART RATE: 63 BPM | BODY MASS INDEX: 27.62 KG/M2

## 2020-01-24 DIAGNOSIS — Z71.89 GOALS OF CARE, COUNSELING/DISCUSSION: ICD-10-CM

## 2020-01-24 DIAGNOSIS — B02.30 ZOSTER OCULAR DISEASE: Primary | ICD-10-CM

## 2020-01-24 DIAGNOSIS — H05.012 ORBITAL CELLULITIS ON LEFT: Primary | ICD-10-CM

## 2020-01-24 DIAGNOSIS — B02.31 HERPES ZOSTER CONJUNCTIVITIS OF LEFT EYE: ICD-10-CM

## 2020-01-24 DIAGNOSIS — E87.6 HYPOKALEMIA: ICD-10-CM

## 2020-01-24 DIAGNOSIS — L03.213 PERIORBITAL CELLULITIS OF LEFT EYE: Primary | ICD-10-CM

## 2020-01-24 PROBLEM — R79.89 ELEVATED SERUM CREATININE: Status: ACTIVE | Noted: 2020-01-24

## 2020-01-24 LAB
ALBUMIN SERPL BCP-MCNC: 3.3 G/DL (ref 3.5–5)
ALP SERPL-CCNC: 99 U/L (ref 46–116)
ALT SERPL W P-5'-P-CCNC: 30 U/L (ref 12–78)
ANION GAP SERPL CALCULATED.3IONS-SCNC: 8 MMOL/L (ref 4–13)
APTT PPP: 27 SECONDS (ref 23–37)
AST SERPL W P-5'-P-CCNC: 19 U/L (ref 5–45)
BASOPHILS # BLD AUTO: 0.05 THOUSANDS/ΜL (ref 0–0.1)
BASOPHILS NFR BLD AUTO: 1 % (ref 0–1)
BILIRUB SERPL-MCNC: 0.5 MG/DL (ref 0.2–1)
BUN SERPL-MCNC: 24 MG/DL (ref 5–25)
CALCIUM SERPL-MCNC: 8.9 MG/DL (ref 8.3–10.1)
CHLORIDE SERPL-SCNC: 102 MMOL/L (ref 100–108)
CO2 SERPL-SCNC: 30 MMOL/L (ref 21–32)
CREAT SERPL-MCNC: 1.39 MG/DL (ref 0.6–1.3)
EOSINOPHIL # BLD AUTO: 0.17 THOUSAND/ΜL (ref 0–0.61)
EOSINOPHIL NFR BLD AUTO: 2 % (ref 0–6)
ERYTHROCYTE [DISTWIDTH] IN BLOOD BY AUTOMATED COUNT: 13.2 % (ref 11.6–15.1)
GFR SERPL CREATININE-BSD FRML MDRD: 35 ML/MIN/1.73SQ M
GLUCOSE SERPL-MCNC: 89 MG/DL (ref 65–140)
HCT VFR BLD AUTO: 44.7 % (ref 34.8–46.1)
HGB BLD-MCNC: 14.6 G/DL (ref 11.5–15.4)
IMM GRANULOCYTES # BLD AUTO: 0.05 THOUSAND/UL (ref 0–0.2)
IMM GRANULOCYTES NFR BLD AUTO: 1 % (ref 0–2)
INR PPP: 1.01 (ref 0.91–1.09)
LYMPHOCYTES # BLD AUTO: 1.38 THOUSANDS/ΜL (ref 0.6–4.47)
LYMPHOCYTES NFR BLD AUTO: 18 % (ref 14–44)
MCH RBC QN AUTO: 31.9 PG (ref 26.8–34.3)
MCHC RBC AUTO-ENTMCNC: 32.7 G/DL (ref 31.4–37.4)
MCV RBC AUTO: 98 FL (ref 82–98)
MONOCYTES # BLD AUTO: 1.23 THOUSAND/ΜL (ref 0.17–1.22)
MONOCYTES NFR BLD AUTO: 16 % (ref 4–12)
NEUTROPHILS # BLD AUTO: 4.98 THOUSANDS/ΜL (ref 1.85–7.62)
NEUTS SEG NFR BLD AUTO: 62 % (ref 43–75)
NRBC BLD AUTO-RTO: 0 /100 WBCS
PLATELET # BLD AUTO: 302 THOUSANDS/UL (ref 149–390)
PMV BLD AUTO: 9.5 FL (ref 8.9–12.7)
POTASSIUM SERPL-SCNC: 4 MMOL/L (ref 3.5–5.3)
PROT SERPL-MCNC: 7.3 G/DL (ref 6.4–8.2)
PROTHROMBIN TIME: 10.9 SECONDS (ref 9.8–12)
RBC # BLD AUTO: 4.58 MILLION/UL (ref 3.81–5.12)
SODIUM SERPL-SCNC: 140 MMOL/L (ref 136–145)
WBC # BLD AUTO: 7.86 THOUSAND/UL (ref 4.31–10.16)

## 2020-01-24 PROCEDURE — 36415 COLL VENOUS BLD VENIPUNCTURE: CPT | Performed by: EMERGENCY MEDICINE

## 2020-01-24 PROCEDURE — 1123F ACP DISCUSS/DSCN MKR DOCD: CPT | Performed by: NURSE PRACTITIONER

## 2020-01-24 PROCEDURE — 99284 EMERGENCY DEPT VISIT MOD MDM: CPT | Performed by: EMERGENCY MEDICINE

## 2020-01-24 PROCEDURE — 85730 THROMBOPLASTIN TIME PARTIAL: CPT | Performed by: EMERGENCY MEDICINE

## 2020-01-24 PROCEDURE — 70480 CT ORBIT/EAR/FOSSA W/O DYE: CPT

## 2020-01-24 PROCEDURE — 99214 OFFICE O/P EST MOD 30 MIN: CPT | Performed by: FAMILY MEDICINE

## 2020-01-24 PROCEDURE — 99223 1ST HOSP IP/OBS HIGH 75: CPT | Performed by: INTERNAL MEDICINE

## 2020-01-24 PROCEDURE — 96361 HYDRATE IV INFUSION ADD-ON: CPT

## 2020-01-24 PROCEDURE — 85025 COMPLETE CBC W/AUTO DIFF WBC: CPT | Performed by: EMERGENCY MEDICINE

## 2020-01-24 PROCEDURE — 80053 COMPREHEN METABOLIC PANEL: CPT | Performed by: EMERGENCY MEDICINE

## 2020-01-24 PROCEDURE — 99285 EMERGENCY DEPT VISIT HI MDM: CPT

## 2020-01-24 PROCEDURE — 87040 BLOOD CULTURE FOR BACTERIA: CPT | Performed by: EMERGENCY MEDICINE

## 2020-01-24 PROCEDURE — 96365 THER/PROPH/DIAG IV INF INIT: CPT

## 2020-01-24 PROCEDURE — 85610 PROTHROMBIN TIME: CPT | Performed by: EMERGENCY MEDICINE

## 2020-01-24 RX ORDER — TRAZODONE HYDROCHLORIDE 50 MG/1
50 TABLET ORAL
COMMUNITY
Start: 2020-01-01

## 2020-01-24 RX ORDER — HEPARIN SODIUM 5000 [USP'U]/ML
5000 INJECTION, SOLUTION INTRAVENOUS; SUBCUTANEOUS EVERY 8 HOURS SCHEDULED
Status: DISCONTINUED | OUTPATIENT
Start: 2020-01-24 | End: 2020-02-03 | Stop reason: HOSPADM

## 2020-01-24 RX ORDER — POLYETHYLENE GLYCOL 3350 17 G/17G
17 POWDER, FOR SOLUTION ORAL DAILY PRN
Status: DISCONTINUED | OUTPATIENT
Start: 2020-01-24 | End: 2020-02-03 | Stop reason: HOSPADM

## 2020-01-24 RX ORDER — GALANTAMINE HYDROBROMIDE 8 MG/1
8 TABLET, FILM COATED ORAL 2 TIMES DAILY WITH MEALS
Status: DISCONTINUED | OUTPATIENT
Start: 2020-01-25 | End: 2020-01-27

## 2020-01-24 RX ORDER — ASPIRIN 81 MG/1
81 TABLET, CHEWABLE ORAL DAILY
Status: DISCONTINUED | OUTPATIENT
Start: 2020-01-25 | End: 2020-02-03 | Stop reason: HOSPADM

## 2020-01-24 RX ORDER — ACETAMINOPHEN 325 MG/1
975 TABLET ORAL EVERY 6 HOURS PRN
Status: DISCONTINUED | OUTPATIENT
Start: 2020-01-24 | End: 2020-02-03

## 2020-01-24 RX ORDER — TOBRAMYCIN AND DEXAMETHASONE 3; 1 MG/ML; MG/ML
1 SUSPENSION/ DROPS OPHTHALMIC EVERY 6 HOURS SCHEDULED
Status: DISCONTINUED | OUTPATIENT
Start: 2020-01-24 | End: 2020-01-24 | Stop reason: HOSPADM

## 2020-01-24 RX ORDER — TRAZODONE HYDROCHLORIDE 50 MG/1
50 TABLET ORAL
Status: DISCONTINUED | OUTPATIENT
Start: 2020-01-24 | End: 2020-02-03 | Stop reason: HOSPADM

## 2020-01-24 RX ORDER — NEBIVOLOL 5 MG/1
5 TABLET ORAL DAILY
Status: DISCONTINUED | OUTPATIENT
Start: 2020-01-25 | End: 2020-02-03 | Stop reason: HOSPADM

## 2020-01-24 RX ORDER — OXYCODONE HYDROCHLORIDE 5 MG/1
5 TABLET ORAL EVERY 4 HOURS PRN
Status: DISCONTINUED | OUTPATIENT
Start: 2020-01-24 | End: 2020-02-03 | Stop reason: HOSPADM

## 2020-01-24 RX ORDER — FLUTICASONE PROPIONATE 50 MCG
2 SPRAY, SUSPENSION (ML) NASAL DAILY
Status: DISCONTINUED | OUTPATIENT
Start: 2020-01-25 | End: 2020-02-03 | Stop reason: HOSPADM

## 2020-01-24 RX ORDER — LISINOPRIL 10 MG/1
10 TABLET ORAL DAILY
Status: DISCONTINUED | OUTPATIENT
Start: 2020-01-25 | End: 2020-01-24

## 2020-01-24 RX ORDER — ATORVASTATIN CALCIUM 40 MG/1
80 TABLET, FILM COATED ORAL DAILY
Status: DISCONTINUED | OUTPATIENT
Start: 2020-01-25 | End: 2020-02-03 | Stop reason: HOSPADM

## 2020-01-24 RX ORDER — SODIUM CHLORIDE 9 MG/ML
50 INJECTION, SOLUTION INTRAVENOUS CONTINUOUS
Status: DISCONTINUED | OUTPATIENT
Start: 2020-01-24 | End: 2020-01-25

## 2020-01-24 RX ORDER — HYDROMORPHONE HCL/PF 1 MG/ML
0.2 SYRINGE (ML) INJECTION EVERY 4 HOURS PRN
Status: DISCONTINUED | OUTPATIENT
Start: 2020-01-24 | End: 2020-02-03 | Stop reason: HOSPADM

## 2020-01-24 RX ORDER — OXYCODONE HYDROCHLORIDE 5 MG/1
2.5 TABLET ORAL EVERY 4 HOURS PRN
Status: DISCONTINUED | OUTPATIENT
Start: 2020-01-24 | End: 2020-02-03 | Stop reason: HOSPADM

## 2020-01-24 RX ORDER — LORAZEPAM 1 MG/1
1 TABLET ORAL
Status: DISCONTINUED | OUTPATIENT
Start: 2020-01-24 | End: 2020-02-03 | Stop reason: HOSPADM

## 2020-01-24 RX ORDER — CEFAZOLIN SODIUM 1 G/50ML
1000 SOLUTION INTRAVENOUS EVERY 8 HOURS
Status: DISCONTINUED | OUTPATIENT
Start: 2020-01-24 | End: 2020-01-24

## 2020-01-24 RX ORDER — CEFAZOLIN SODIUM 1 G/50ML
1000 SOLUTION INTRAVENOUS EVERY 12 HOURS
Status: DISCONTINUED | OUTPATIENT
Start: 2020-01-25 | End: 2020-01-29

## 2020-01-24 RX ADMIN — ACETAMINOPHEN 975 MG: 325 TABLET, FILM COATED ORAL at 21:30

## 2020-01-24 RX ADMIN — TRAZODONE HYDROCHLORIDE 50 MG: 50 TABLET ORAL at 21:31

## 2020-01-24 RX ADMIN — CEFAZOLIN SODIUM 1000 MG: 1 SOLUTION INTRAVENOUS at 21:31

## 2020-01-24 RX ADMIN — SODIUM CHLORIDE 500 ML: 0.9 INJECTION, SOLUTION INTRAVENOUS at 13:14

## 2020-01-24 RX ADMIN — SODIUM CHLORIDE 50 ML/HR: 0.9 INJECTION, SOLUTION INTRAVENOUS at 21:32

## 2020-01-24 RX ADMIN — ACYCLOVIR SODIUM 500 MG: 50 INJECTION, SOLUTION INTRAVENOUS at 14:04

## 2020-01-24 RX ADMIN — TOBRAMYCIN AND DEXAMETHASONE 1 DROP: 3; 1 SUSPENSION/ DROPS OPHTHALMIC at 14:36

## 2020-01-24 RX ADMIN — HEPARIN SODIUM 5000 UNITS: 5000 INJECTION INTRAVENOUS; SUBCUTANEOUS at 21:31

## 2020-01-24 RX ADMIN — Medication 500 MG: at 22:32

## 2020-01-24 NOTE — ASSESSMENT & PLAN NOTE
Left periorbital rash with pain started 4 days ago however patient denies any current pain    · Start IV acyclovir  · Ophthalmology consult  · Airborne and contact isolation

## 2020-01-24 NOTE — ED PROVIDER NOTES
History  Chief Complaint   Patient presents with    Eye Swelling     pt sent here byv Dr Valorie Gaspar for increased l eye swelling/redness  pt on keflex for about 2 days     78-year-old female presents from [facility]  Patient was seen 2 days ago and diagnosed with possibly periorbital cellulitis the left eye  Recheck again today in the office and was found that she has now a rash that has spread up onto her forehead into the hairline not crossing the midline so very concerning for zoster  Patient is awake and alert states she does have some decreased vision in that eye which is red and irritated  Patient also does have history of dementia  Dr Valorie Gaspar called from [facility] to update me on the patient  History provided by:  Patient   used: No        Prior to Admission Medications   Prescriptions Last Dose Informant Patient Reported? Taking? BYSTOLIC 10 MG tablet   Yes No   BYSTOLIC 5 MG tablet   No No   Sig: take 1 tablet by mouth once daily   LORazepam (ATIVAN) 1 mg tablet   No No   Sig: Take 1 tablet (1 mg total) by mouth every 15 (fifteen) minutes as needed for anxiety (take one 15 min prior to MRI, the other if needed)   Misc  Devices MISC   No No   Sig: by Does not apply route daily Hip hugger  Available at  [facility]   aspirin 81 mg chewable tablet   Yes No   Sig: Chew 81 mg daily   atorvastatin (LIPITOR) 80 mg tablet   No No   Sig: take 1 tablet by mouth once daily   cephalexin (KEFLEX) 500 mg capsule   No No   Sig: Take 1 capsule (500 mg total) by mouth every 12 (twelve) hours for 7 days   fluticasone (FLONASE) 50 mcg/act nasal spray   No No   Si sprays into each nostril daily   galantamine (RAZADYNE) 8 mg tablet   No No   Sig: TO BE USED ON WEEK 3 AND UP OF TITRATION   TAKE 1 TABLET BY MOUTH TWICE A DAY   lisinopril (ZESTRIL) 10 mg tablet   No No   Sig: take 1 tablet by mouth once daily   mirtazapine (REMERON) 15 mg tablet   No No   Sig: Follow clinic instructions for titration; 1st week 1/2 tab qHS, 2nd wk 1 tab qHS, 3rd wk 2 tab qHS, 4th wk 3 tab qHS   naproxen (NAPROSYN) 375 mg tablet   Yes No   Sig: Take 1 tablet by mouth every 12 (twelve) hours as needed   traZODone (DESYREL) 50 mg tablet   Yes No      Facility-Administered Medications: None       Past Medical History:   Diagnosis Date    Alzheimer disease ([facility] [address] ) 2018    Arthritis     D[facility] ([facility] )     Encounter for screening for osteoporosis     HL (hearing loss)     Hyperlipidemia     Hypertension     Sciatica        Past Surgical History:   Procedure Laterality Date    SPINE SURGERY      lumbar surgery for pinched nerve        Family History   Problem Relation Age of Onset    Alzheimer's disease Mother     Heart disease Father     Alzheimer's disease Family     Heart disease Family     Alzheimer's disease Sister     Stroke Sister      I have reviewed and agree with the history as documented  Social History     Tobacco Use    Smoking status: Former Smoker     Packs/day: 1 00     Years: 68 00     Pack years: 68 00     Start date: 2019     Last attempt to quit: 2019     Years since quittin 0    Smokeless tobacco: Never Used   Substance Use Topics    Alcohol use: Not Currently    Drug use: Not Currently        Review of Systems   Constitutional: Negative for activity change, chills, diaphoresis and fever  HENT: Negative for congestion, ear pain, nosebleeds, sore throat, trouble swallowing and voice change  Eyes: Positive for pain, redness and visual disturbance  Negative for discharge  Respiratory: Negative for apnea, cough, choking, shortness of breath, wheezing and stridor  Cardiovascular: Negative for chest pain and palpitations  Gastrointestinal: Negative for abdominal distention, abdominal pain, constipation, diarrhea, nausea and vomiting  Endocrine: Negative for polydipsia     Genitourinary: Negative for difficulty urinating, dysuria, flank pain, frequency, hematuria and urgency  Musculoskeletal: Negative for back pain, gait problem, joint swelling, myalgias, neck pain and neck stiffness  Skin: Negative for pallor and rash  Neurological: Negative for dizziness, tremors, syncope, speech difficulty, weakness, numbness and headaches  Hematological: Negative for adenopathy  Psychiatric/Behavioral: Negative for confusion, hallucinations, self-injury and suicidal ideas  The patient is not nervous/anxious  Physical Exam  Physical Exam   Constitutional: She is oriented to person, place, and time  Vital signs are normal  She appears well-developed and well-nourished  No distress  HENT:   Head: Normocephalic and atraumatic  Right Ear: External ear normal    Left Ear: External ear normal    Nose: Nose normal    Mouth/Throat: Oropharynx is clear and moist    Eyes: Pupils are equal, round, and reactive to light  Patient does have redness swelling irritation to the left eye as well as rash around the eye and up into the forehead consistent with possible zoster  Neck: Normal range of motion  Neck supple  Cardiovascular: Normal rate, regular rhythm, normal heart sounds and intact distal pulses  Pulmonary/Chest: Effort normal and breath sounds normal    Abdominal: Soft  Bowel sounds are normal    Musculoskeletal: Normal range of motion  Neurological: She is alert and oriented to person, place, and time  She has normal reflexes  Skin: Skin is warm and dry  She is not diaphoretic  Psychiatric: She has a normal mood and affect  Nursing note and vitals reviewed        Vital Signs  ED Triage Vitals [01/24/20 1241]   Temperature Pulse Respirations Blood Pressure SpO2   99 °F (37 2 °C) 70 18 (!) 178/85 95 %      Temp Source Heart Rate Source Patient Position - Orthostatic VS BP Location FiO2 (%)   Tympanic Monitor Sitting Left arm --      Pain Score       5           Vitals:    01/24/20 1241 01/24/20 1500   BP: (!) 178/85 (!) 172/84 Pulse: 70 68   Patient Position - Orthostatic VS: Sitting Lying         Visual Acuity      ED Medications  Medications   tobramycin-dexamethasone (TOBRADEX) 0 3-0 1 % ophthalmic suspension 1 drop (1 drop Ophthalmic Given 1/24/20 1436)   sodium chloride 0 9 % bolus 500 mL (0 mL Intravenous Stopped 1/24/20 1505)   acyclovir (ZOVIRAX) 500 mg in sodium chloride 0 9 % 100 mL IVPB (500 mg Intravenous New Bag 1/24/20 1404)       Diagnostic Studies  Results Reviewed     Procedure Component Value Units Date/Time    Protime-INR [[id number]]  (Normal) Collected:  01/24/20 1312    Lab Status:  Final result Specimen:  Blood from Arm, Right Updated:  01/24/20 1350     Protime 10 9 seconds      INR 1 01    APTT [[id number]]  (Normal) Collected:  01/24/20 1312    Lab Status:  Final result Specimen:  Blood from Arm, Right Updated:  01/24/20 1350     PTT 27 seconds     Comprehensive metabolic panel [[id number]]  (Abnormal) Collected:  01/24/20 1312    Lab Status:  Final result Specimen:  Blood from Arm, Right Updated:  01/24/20 1341     Sodium 140 mmol/L      Potassium 4 0 mmol/L      Chloride 102 mmol/L      CO2 30 mmol/L      ANION GAP 8 mmol/L      BUN 24 mg/dL      Creatinine 1 39 mg/dL      Glucose 89 mg/dL      Calcium 8 9 mg/dL      AST 19 U/L      ALT 30 U/L      Alkaline Phosphatase 99 U/L      Total Protein 7 3 g/dL      Albumin 3 3 g/dL      Total Bilirubin 0 50 mg/dL      eGFR 35 ml/min/1 73sq m     Narrative:       Shey guidelines for Chronic Kidney Disease (CKD):     Stage 1 with normal or high GFR (GFR > 90 mL/min/1 73 square meters)    Stage 2 Mild CKD (GFR = 60-89 mL/min/1 73 square meters)    Stage 3A Moderate CKD (GFR = 45-59 mL/min/1 73 square meters)    Stage 3B Moderate CKD (GFR = 30-44 mL/min/1 73 square meters)    Stage 4 Severe CKD (GFR = 15-29 mL/min/1 73 square meters)    Stage 5 End Stage CKD (GFR <15 mL/min/1 73 square meters)  Note: GFR calculation is accurate only with a steady state creatinine    Blood culture #1 [[id number]] Collected:  01/24/20 1308    Lab Status: In process Specimen:  Blood from Arm, Left Updated:  01/24/20 1322    Blood culture #2 [[id number]] Collected:  01/24/20 1312    Lab Status:   In process Specimen:  Blood from Arm, Right Updated:  01/24/20 1322    CBC and differential [[id number]]  (Abnormal) Collected:  01/24/20 1312    Lab Status:  Final result Specimen:  Blood from Arm, Right Updated:  01/24/20 1320     WBC 7 86 Thousand/uL      RBC 4 58 Million/uL      Hemoglobin 14 6 g/dL      Hematocrit 44 7 %      MCV 98 fL      MCH 31 9 pg      MCHC 32 7 g/dL      RDW 13 2 %      MPV 9 5 fL      Platelets 881 Thousands/uL      nRBC 0 /100 WBCs      Neutrophils Relative 62 %      Immat GRANS % 1 %      Lymphocytes Relative 18 %      Monocytes Relative 16 %      Eosinophils Relative 2 %      Basophils Relative 1 %      Neutrophils Absolute 4 98 Thousands/µL      Immature Grans Absolute 0 05 Thousand/uL      Lymphocytes Absolute 1 38 Thousands/µL      Monocytes Absolute 1 23 Thousand/µL      Eosinophils Absolute 0 17 Thousand/µL      Basophils Absolute 0 05 Thousands/µL                  CT orbits/temporal bones/skull base wo contrast   Final Result by Benjie Wade MD (01/24 1414)   Diffuse acute preseptal cellulitis with periorbital edema      No intrinsic globe pathology identified            Workstation performed: [id number]                    Procedures  Procedures         ED Course  ED Course as of Jan 24 1505   Fri Jan 24, 2020   1453 Patient accepted by Dr James Glass at [address]      Disposition  Final diagnoses:   Zoster ocular disease     Time reflects when diagnosis was documented in both MDM as applicable and the Disposition within this note     Time User Action Codes Description Comment    1/24/2020  2:52 PM Destinee Nations Add [B02 39] Zoster ocular disease       ED Disposition     ED Disposition Condition Date/Time Comment    Transfer to Another Facility-In Network  Fri Jan 24, 2020  2:51 PM Ivett Pop should be transferred out to  [facility]        MD Documentation      Most Recent Value   Patient Condition  The patient has been stabilized such that within reasonable medical probability, no material deterioration of the patient condition or the condition of the unborn child(josh) is likely to result from the transfer   Reason for Transfer  Level of Care needed not available at this facility   Benefits of Transfer  Specialized equipment and/or services available at the receiving facility (Include comment)________________________   Risks of Transfer  Increased discomfort during transfer   Accepting Physician  Dr Aparna Hale Name, [address] [place] [address]    (Name & Tel number)  pacs   Transported by (Company and Unit #)  slets   Sending MD Dr Cristin Saleh   Provider Certification  General risk, such as traffic hazards, adverse weather conditions, rough terrain or turbulence, possible failure of equipment (including vehicle or aircraft), or consequences of actions of persons outside the control of the transport personnel      RN Documentation      [address] Name, [facility]    (Name & Tel number)  pacs   Transported by [employer] and Unit #)  slets      Follow-up Information    None         Patient's Medications   Discharge Prescriptions    No medications on file     No discharge procedures on file      ED Provider  Electronically Signed by           Damian Gonsalez DO  01/24/20 2874

## 2020-01-24 NOTE — PLAN OF CARE
Problem: Prexisting or High Potential for Compromised Skin Integrity  Goal: Skin integrity is maintained or improved  Description  INTERVENTIONS:  - Identify patients at risk for skin breakdown  - Assess and monitor skin integrity  - Assess and monitor nutrition and hydration status  - Monitor labs   - Assess for incontinence   - Turn and reposition patient  - Assist with mobility/ambulation  - Relieve pressure over bony prominences  - Avoid friction and shearing  - Provide appropriate hygiene as needed including keeping skin clean and dry  - Evaluate need for skin moisturizer/barrier cream  - Collaborate with interdisciplinary team   - Patient/family teaching  - Consider wound care consult   Outcome: Progressing     Problem: Potential for Falls  Goal: Patient will remain free of falls  Description  INTERVENTIONS:  - Assess patient frequently for physical needs  -  Identify cognitive and physical deficits and behaviors that affect risk of falls    -  Braman fall precautions as indicated by assessment   - Educate patient/family on patient safety including physical limitations  - Instruct patient to call for assistance with activity based on assessment  - Modify environment to reduce risk of injury  - Consider OT/PT consult to assist with strengthening/mobility  Outcome: Progressing

## 2020-01-24 NOTE — ASSESSMENT & PLAN NOTE
Spoke with patient's daughter and son, they reported that patient has been having disorientation, jerky arms movement/tremors and slurred/incoherent speech for the last 6 months    · Continue home medication (RAZADYNE)  · Will consider ordering CT scan of head however based on patient's children is not acute, might consider LP for viral encephalopathy?

## 2020-01-24 NOTE — ASSESSMENT & PLAN NOTE
POA, left periorbital rash with yellowish pustules and yellowish discharge from the left eye with ecchymosis of the left eye for the last 4 days    Was associated with pain 1st day however patient denies any current pain    · CT orbits/temporal bones/skull base wo contrast: Diffuse acute preseptal cellulitis with periorbital edema  · Possibly bacterial periorbital cellulitis superimposed on shingles    Plan:  · Follow-up on blood cultures  · Start cefazolin IV (renal dosed)  · Start acyclovir IV  · Airborne and contact isolation  · Ophthalmology consult  · Will consider ID consult if no improvement

## 2020-01-24 NOTE — ASSESSMENT & PLAN NOTE
Blood pressure currently stable      · Hold lisinopril for now due to elevated creatinine  · Continue nebivolol  · Add hydralazine p r n    · Monitor blood pressure

## 2020-01-24 NOTE — EMTALA/ACUTE CARE TRANSFER
[address] [facility] [address]  [place] [address]  Dept: [phone]      EMTALA TRANSFER CONSENT    NAME Katya Blanton                                         1935                              MRN [id number]    I have been informed of my rights regarding examination, treatment, and transfer   by Dr Stephanie Naranjo DO    Benefits: Specialized equipment and/or services available at the receiving facility (Include comment)________________________    Risks: Increased discomfort during transfer      Consent for Transfer:  I acknowledge that my medical condition has been evaluated and explained to me by the emergency department physician or other qualified medical person and/or my attending physician, who has recommended that I be transferred to the service of  Accepting Physician: Dr Matteo Cope at [address] [place] [address] Name, [address] : [facility]  The above potential benefits of such transfer, the potential risks associated with such transfer, and the probable risks of not being transferred have been explained to me, and I fully understand them  The doctor has explained that, in my case, the benefits of transfer outweigh the risks  I agree to be transferred  I authorize the performance of emergency medical procedures and treatments upon me in both transit and upon arrival at the receiving facility  Additionally, I authorize the release of any and all medical records to the receiving facility and request they be transported with me, if possible  I understand that the safest mode of transportation during a medical emergency is an ambulance and that the Hospital advocates the use of this mode of transport  Risks of traveling to the receiving facility by car, including absence of medical control, life sustaining equipment, such as oxygen, and medical personnel has been explained to me and I fully understand them      (DAGOBERTO CORRECT BOX BELOW)  [  ]  I consent to the stated transfer and to be transported by ambulance/helicopter  [  ]  I consent to the stated transfer, but refuse transportation by ambulance and accept full responsibility for my transportation by car  I understand the risks of non-ambulance transfers and I exonerate the Hospital and its staff from any deterioration in my condition that results from this refusal     X___________________________________________    DATE  20  TIME________  Signature of patient or legally responsible individual signing on patient behalf           RELATIONSHIP TO PATIENT_________________________          Provider Certification    NAME Lexi Johnson                                         1935                              MRN [id number]    A medical screening exam was performed on the above named patient  Based on the examination:    Condition Necessitating Transfer The encounter diagnosis was Zoster ocular disease  Patient Condition: The patient has been stabilized such that within reasonable medical probability, no material deterioration of the patient condition or the condition of the unborn child(josh) is likely to result from the transfer    Reason for Transfer: Level of Care needed not available at this facility    Transfer Requirements: [facility] available and qualified personnel available for treatment as acknowledged by pacs  · Agreed to accept transfer and to provide appropriate medical treatment as acknowledged by       Dr Hazel Ortiz  · Appropriate medical records of the examination and treatment of the patient are provided at the time of transfer   [address] [facility], [address] _______  · Transfer will be performed by qualified personnel from slets  and appropriate transfer equipment as required, including the use of necessary and appropriate life support measures      Provider Certification: I have examined the patient and explained the following risks and benefits of being transferred/refusing transfer to the patient/family:  General risk, such as traffic hazards, adverse weather conditions, rough terrain or turbulence, possible failure of equipment (including vehicle or aircraft), or consequences of actions of persons outside the control of the transport personnel      Based on these reasonable risks and benefits to the patient and/or the unborn child(josh), and based upon the information available at the time of the patients examination, I certify that the medical benefits reasonably to be expected from the provision of appropriate medical treatments at another medical facility outweigh the increasing risks, if any, to the individuals medical condition, and in the case of labor to the unborn child, from effecting the transfer      X____________________________________________ DATE 01/24/20        TIME_______      ORIGINAL - SEND TO MEDICAL RECORDS   COPY - SEND WITH PATIENT DURING TRANSFER

## 2020-01-24 NOTE — PATIENT INSTRUCTIONS
Pt was sent to the ER for further evaluation and admission with consult to ophthamalogy  Pt may need trasnfer for emergent ophth  Consult  IV viral meds and possibly IV antibiotics needed    ER called

## 2020-01-24 NOTE — ASSESSMENT & PLAN NOTE
Baseline creatinine around 1 1    Creatinine on admission 1 39    · Will start gentle hydration  · Avoid hypotension/nephrotoxin (will hold lisinopril for now)  · Monitor I&Os  · Monitor BMP

## 2020-01-24 NOTE — PROGRESS NOTES
Assessment/Plan:    No problem-specific Assessment & Plan notes found for this encounter  Diagnoses and all orders for this visit:    Orbital cellulitis on left    Herpes zoster conjunctivitis of left eye    Other orders  -     traZODone (DESYREL) 50 mg tablet        Subjective:      Patient ID: Gloria Jackson is a 81 y o  female  This is a f/u appt for this 80 yo female who was seen Wednesday night in the office and diagnosed with early orbital cellulitis   She was started on Cephalexin 500 mg bid and took 4 doses  Since that time the rash/redness has spread with new crusting but no discrete lesions of breaks in the skin  The pt has developed worsening left eye conjunctiivitis  The patient denies vision problems but she does have dementia which could cloud her answers  The following portions of the patient's history were reviewed and updated as appropriate: allergies, current medications, past family history, past medical history, past social history, past surgical history and problem list     Review of Systems   Constitutional: Negative  Eyes: Positive for redness and itching  Very erythematous left eye conjunctiva with upper lid swelling   Skin: Positive for rash  Erythematous rash on the left side of her forehead and scalp with crusty areas above the eye  Psychiatric/Behavioral: Positive for confusion          Pt has difficulty answering questions because of dementia         Objective:      Pulse 63   Resp 16   Wt 68 5 kg (151 lb)   SpO2 96%   BMI 27 62 kg/m²          Physical Exam

## 2020-01-25 LAB
ALBUMIN SERPL BCP-MCNC: 2.7 G/DL (ref 3.5–5)
ALP SERPL-CCNC: 90 U/L (ref 46–116)
ALT SERPL W P-5'-P-CCNC: 21 U/L (ref 12–78)
ANION GAP SERPL CALCULATED.3IONS-SCNC: 10 MMOL/L (ref 4–13)
AST SERPL W P-5'-P-CCNC: 22 U/L (ref 5–45)
BASOPHILS # BLD AUTO: 0.06 THOUSANDS/ΜL (ref 0–0.1)
BASOPHILS NFR BLD AUTO: 1 % (ref 0–1)
BILIRUB SERPL-MCNC: 0.37 MG/DL (ref 0.2–1)
BUN SERPL-MCNC: 21 MG/DL (ref 5–25)
CALCIUM SERPL-MCNC: 8.5 MG/DL (ref 8.3–10.1)
CHLORIDE SERPL-SCNC: 106 MMOL/L (ref 100–108)
CO2 SERPL-SCNC: 26 MMOL/L (ref 21–32)
CREAT SERPL-MCNC: 1.46 MG/DL (ref 0.6–1.3)
EOSINOPHIL # BLD AUTO: 0.23 THOUSAND/ΜL (ref 0–0.61)
EOSINOPHIL NFR BLD AUTO: 4 % (ref 0–6)
ERYTHROCYTE [DISTWIDTH] IN BLOOD BY AUTOMATED COUNT: 13.2 % (ref 11.6–15.1)
GFR SERPL CREATININE-BSD FRML MDRD: 33 ML/MIN/1.73SQ M
GLUCOSE SERPL-MCNC: 92 MG/DL (ref 65–140)
HCT VFR BLD AUTO: 40.4 % (ref 34.8–46.1)
HGB BLD-MCNC: 13 G/DL (ref 11.5–15.4)
IMM GRANULOCYTES # BLD AUTO: 0.04 THOUSAND/UL (ref 0–0.2)
IMM GRANULOCYTES NFR BLD AUTO: 1 % (ref 0–2)
LYMPHOCYTES # BLD AUTO: 1.2 THOUSANDS/ΜL (ref 0.6–4.47)
LYMPHOCYTES NFR BLD AUTO: 19 % (ref 14–44)
MCH RBC QN AUTO: 30.7 PG (ref 26.8–34.3)
MCHC RBC AUTO-ENTMCNC: 32.2 G/DL (ref 31.4–37.4)
MCV RBC AUTO: 95 FL (ref 82–98)
MONOCYTES # BLD AUTO: 1.12 THOUSAND/ΜL (ref 0.17–1.22)
MONOCYTES NFR BLD AUTO: 17 % (ref 4–12)
NEUTROPHILS # BLD AUTO: 3.79 THOUSANDS/ΜL (ref 1.85–7.62)
NEUTS SEG NFR BLD AUTO: 58 % (ref 43–75)
NRBC BLD AUTO-RTO: 0 /100 WBCS
PLATELET # BLD AUTO: 282 THOUSANDS/UL (ref 149–390)
PMV BLD AUTO: 9.5 FL (ref 8.9–12.7)
POTASSIUM SERPL-SCNC: 3.5 MMOL/L (ref 3.5–5.3)
PROT SERPL-MCNC: 6.5 G/DL (ref 6.4–8.2)
RBC # BLD AUTO: 4.24 MILLION/UL (ref 3.81–5.12)
SODIUM SERPL-SCNC: 142 MMOL/L (ref 136–145)
WBC # BLD AUTO: 6.44 THOUSAND/UL (ref 4.31–10.16)

## 2020-01-25 PROCEDURE — 80053 COMPREHEN METABOLIC PANEL: CPT | Performed by: INTERNAL MEDICINE

## 2020-01-25 PROCEDURE — 85025 COMPLETE CBC W/AUTO DIFF WBC: CPT | Performed by: INTERNAL MEDICINE

## 2020-01-25 PROCEDURE — 99232 SBSQ HOSP IP/OBS MODERATE 35: CPT | Performed by: INTERNAL MEDICINE

## 2020-01-25 RX ORDER — SODIUM CHLORIDE 9 MG/ML
75 INJECTION, SOLUTION INTRAVENOUS CONTINUOUS
Status: DISCONTINUED | OUTPATIENT
Start: 2020-01-25 | End: 2020-02-01

## 2020-01-25 RX ORDER — TOBRAMYCIN AND DEXAMETHASONE 3; 1 MG/ML; MG/ML
1 SUSPENSION/ DROPS OPHTHALMIC
Status: DISCONTINUED | OUTPATIENT
Start: 2020-01-25 | End: 2020-02-03 | Stop reason: HOSPADM

## 2020-01-25 RX ADMIN — Medication 500 MG: at 04:00

## 2020-01-25 RX ADMIN — NEBIVOLOL HYDROCHLORIDE 5 MG: 5 TABLET ORAL at 11:20

## 2020-01-25 RX ADMIN — TOBRAMYCIN AND DEXAMETHASONE 1 DROP: 3; 1 SUSPENSION/ DROPS OPHTHALMIC at 21:01

## 2020-01-25 RX ADMIN — TRAZODONE HYDROCHLORIDE 50 MG: 50 TABLET ORAL at 21:01

## 2020-01-25 RX ADMIN — ASPIRIN 81 MG 81 MG: 81 TABLET ORAL at 11:20

## 2020-01-25 RX ADMIN — CEFAZOLIN SODIUM 1000 MG: 1 SOLUTION INTRAVENOUS at 23:49

## 2020-01-25 RX ADMIN — TOBRAMYCIN AND DEXAMETHASONE 1 DROP: 3; 1 SUSPENSION/ DROPS OPHTHALMIC at 17:05

## 2020-01-25 RX ADMIN — Medication 500 MG: at 15:38

## 2020-01-25 RX ADMIN — SODIUM CHLORIDE 75 ML/HR: 0.9 INJECTION, SOLUTION INTRAVENOUS at 21:46

## 2020-01-25 RX ADMIN — HEPARIN SODIUM 5000 UNITS: 5000 INJECTION INTRAVENOUS; SUBCUTANEOUS at 21:01

## 2020-01-25 RX ADMIN — HEPARIN SODIUM 5000 UNITS: 5000 INJECTION INTRAVENOUS; SUBCUTANEOUS at 05:32

## 2020-01-25 RX ADMIN — ATORVASTATIN CALCIUM 80 MG: 40 TABLET, FILM COATED ORAL at 11:21

## 2020-01-25 RX ADMIN — CEFAZOLIN SODIUM 1000 MG: 1 SOLUTION INTRAVENOUS at 11:20

## 2020-01-25 NOTE — ASSESSMENT & PLAN NOTE
Herpes Zoster ophthalmicus    · Continue IV acyclovir  · Ophthalmology evaluation pending  · Airborne and contact isolation

## 2020-01-25 NOTE — PROGRESS NOTES
Acyclovir as a read only order due to multiple timing changes, Spoke to pharmacy OK to give  Unable to scan into computer  Double check completed  Medication hung   Billy Addison RN

## 2020-01-25 NOTE — PROGRESS NOTES
Patient awake in bed, moaning and moving restlessly in bed  Repositioned patient in bed  Removed SCD pumps as patient was scratching at them and at her legs  Patient denying any pain at this time  Patient now resting in bed with eyes closed  Bed alarm on  Bed low and locked; call bell within reach  Will continue to monitor   Alayna Hector RN

## 2020-01-25 NOTE — CONSULTS
This 50-year-old woman was diagnosed with preseptal cellulitis and then herpes zoster ophthalmicus at the [facility] on 01/24  She was transferred to [facility] for further care and ophthalmic evaluation  The patient is moderately demented and unable to provide history  On examination, the patient appears to be resting comfortably in bed at this time  There is a left V1 macular papular rash which involves the tip of her nose in her left upper lid  There is yellow crusting discharge on the lashes  The left eye is mildly injected  The cornea is clear  The anterior chamber is formed  The left eye was dilated with Mydriacyl and Mike-Synephrine  The vitreous cavity is clear  The optic nerve is pink and flat with 0 25 physiologic cupping  The macula vessels and periphery are unremarkable  Impression:  Left V1 herpes zoster ophthalmicus, anterior segment involvement  No retinopathy  Plan:  Agree with IV antibiotics and IV acyclovir  Continue tobramycin dexamethasone topically q i d  For 10 days  Please re-consult p r n  For signs and symptoms

## 2020-01-25 NOTE — ASSESSMENT & PLAN NOTE
Baseline creatinine around 1 1    Creatinine on admission 1 39    · Avoid hypotension/nephrotoxin (will hold lisinopril for now)  · Monitor I&Os  · Monitor BMP

## 2020-01-25 NOTE — PLAN OF CARE
Problem: Prexisting or High Potential for Compromised Skin Integrity  Goal: Skin integrity is maintained or improved  Description  INTERVENTIONS:  - Identify patients at risk for skin breakdown  - Assess and monitor skin integrity  - Assess and monitor nutrition and hydration status  - Monitor labs   - Assess for incontinence   - Turn and reposition patient  - Assist with mobility/ambulation  - Relieve pressure over bony prominences  - Avoid friction and shearing  - Provide appropriate hygiene as needed including keeping skin clean and dry  - Evaluate need for skin moisturizer/barrier cream  - Collaborate with interdisciplinary team   - Patient/family teaching  - Consider wound care consult   Outcome: Progressing     Problem: Potential for Falls  Goal: Patient will remain free of falls  Description  INTERVENTIONS:  - Assess patient frequently for physical needs  -  Identify cognitive and physical deficits and behaviors that affect risk of falls    -  [place] fall precautions as indicated by assessment   - Educate patient/family on patient safety including physical limitations  - Instruct patient to call for assistance with activity based on assessment  - Modify environment to reduce risk of injury  - Consider OT/PT consult to assist with strengthening/mobility  Outcome: Progressing

## 2020-01-25 NOTE — H&P
H&P- Earle Avalos 1935, 81 y o  female MRN: [id number]    Unit/Bed#: S -01 Encounter: [id number]    Primary Care Provider: Cesilia Reagan DO   Date and time admitted to hospital: 1/24/2020  5:50 PM        * Periorbital cellulitis of left eye  Assessment & Plan  POA, left periorbital rash with yellowish pustules and yellowish discharge from the left eye with ecchymosis of the left eye for the last 4 days  Was associated with pain 1st day however patient denies any current pain    Possibly bacterial periorbital cellulitis superimposed on shingles    Plan:  · Follow-up on blood cultures  · Start cefazolin IV (renal dosed)  · Start acyclovir IV  · Airborne and contact isolation  · Ophthalmology consult  · Will consider ID consult if no improvement    Herpes zoster conjunctivitis of left eye  Assessment & Plan  Left periorbital rash with pain started 4 days ago however patient denies any current pain    · Start IV acyclovir  · Ophthalmology consult  · Airborne and contact isolation    Elevated serum creatinine with CKD stage 3  Assessment & Plan  Baseline creatinine around 1 1  Creatinine on admission 1 39    · Will start gentle hydration  · Avoid hypotension/nephrotoxin (will hold lisinopril for now)  · Monitor I&Os  · Monitor BMP    Hyperlipidemia  Assessment & Plan  Continue Lipitor    Late onset Alzheimer's disease without behavioral disturbance ([facility] [address] )  Assessment & Plan  Spoke with patient's daughter and son, they reported that patient has been having disorientation, jerky arms movement/tremors and slurred/incoherent speech for the last 6 months    · Continue home medication (RAZADYNE)  · Will consider ordering CT scan of head however based on patient's children is not acute, might consider LP for viral encephalopathy? Benign essential hypertension  Assessment & Plan  Blood pressure currently stable      · Hold lisinopril for now due to elevated creatinine  · Continue nebivolol  · Add hydralazine p r n    · Monitor blood pressure      VTE Prophylaxis: Heparin  / sequential compression device   Code Status:  Level 1 -full code  POLST: POLST form is not discussed and not completed at this time  Anticipated Length of Stay:  Patient will be admitted on an Inpatient basis with an anticipated length of stay of  greater than 2 midnights  Justification for Hospital Stay:  Shingles/periorbital cellulitis requiring IV medications    Chief Complaint:   Left periorbital rash/pain    History of Present Illness:    Iván Christianson is a 81 y o  female with history of dementia, hypertension, hyperlipidemia who presents with 4 days of left periorbital rash and pain  Due to patient dementia and she was only oriented to self, was unable to obtain detailed history  After speaking with her son and daughter, they stated that she lives with a son and she has been having slurry speech, confusion and upper extremity tremors for the last 6 months  Four days ago she started to complain of left eye pain and developed rash around her eye, the denied any fever or sweating  [facility] management of shingles and possible bacterial periorbital cellulitis  Review of Systems:    Review of Systems   Unable to perform ROS: Dementia       Past Medical and Surgical History:     Past Medical History:   Diagnosis Date    Alzheimer disease ([facility] [address] ) 2018    Arthritis     Dementia ([facility] [address] )     Encounter for screening for osteoporosis     HL (hearing loss)     Hyperlipidemia     Hypertension     Sciatica 2018       Past Surgical History:   Procedure Laterality Date    SPINE SURGERY  1980    lumbar surgery for pinched nerve        Meds/Allergies:    Prior to Admission medications    Medication Sig Start Date End Date Taking?  Authorizing Provider   aspirin 81 mg chewable tablet Chew 81 mg daily    Historical Provider, MD   atorvastatin (LIPITOR) 80 mg tablet take 1 tablet by mouth once daily 1/2/20   Cipriano Acevedo Pretty Doherty DO   BYSTOLIC 10 MG tablet  9/24/09   Historical Provider, MD   BYSTOLIC 5 MG tablet take 1 tablet by mouth once daily 10/29/19   Franky Chente, DO   cephalexin [facility]) 500 mg capsule Take 1 capsule (500 mg total) by mouth every 12 (twelve) hours for 7 days 1/22/20 1/29/20  Franky Chente, DO   fluticasone [facility]) 50 mcg/act nasal spray 2 sprays into each nostril daily 1/23/19   Gris Ludwig NP   galantamine (RAZADYNE) 8 mg tablet TO BE USED ON WEEK 3 AND UP OF TITRATION  TAKE 1 TABLET BY MOUTH TWICE A DAY 11/14/19   Chris Jones MD   lisinopril (ZESTRIL) 10 mg tablet take 1 tablet by mouth once daily 10/5/19   Franky Eldridge DO   LORazepam (ATIVAN) 1 mg tablet Take 1 tablet (1 mg total) by mouth every 15 (fifteen) minutes as needed for anxiety (take one 15 min prior to MRI, the other if needed) 10/29/19   Chris Jones MD   mirtazapine (REMERON) 15 mg tablet Follow clinic instructions for titration; 1st week 1/2 tab qHS, 2nd wk 1 tab qHS, 3rd wk 2 tab qHS, 4th wk 3 tab qHS 12/12/19   Chris Jones MD   Misc  Devices MISC by Does not apply route daily [employer]  Available at [employer] 9/23/19   Chris Jones MD   naproxen (NAPROSYN) 375 mg tablet Take 1 tablet by mouth every 12 (twelve) hours as needed 10/19/17   Historical Provider, MD   traZODone (DESYREL) 50 mg tablet  1/1/20   Historical Provider, MD     I have reviewed home medications with patient personally  Allergies: Allergies   Allergen Reactions    Other      Permanent hair dye       Social History:     Marital Status:     Occupation:  Noncontributory  Patient Pre-hospital Living Situation:  Home with son  Patient Pre-hospital Level of Mobility:  With assistance  Patient Pre-hospital Diet Restrictions:  No restriction but with assistance  Substance Use History:   Social History     Substance and Sexual Activity   Alcohol Use Not Currently     Social History     Tobacco Use   Smoking Status Former Smoker    Packs/day: 1 00  Years: 68 00    Pack years: 68 00    Start date: 2019    Last attempt to quit: 2019    Years since quittin 0   Smokeless Tobacco Never Used     Social History     Substance and Sexual Activity   Drug Use Not Currently       Family History:    Family History   Problem Relation Age of Onset    Alzheimer's disease Mother     Heart disease Father     Alzheimer's disease Family     Heart disease Family     Alzheimer's disease Sister     Stroke Sister        Physical Exam:     Vitals:   Blood Pressure: (!) 172/80 (20)  Pulse: 70 (20)  Temperature: 98 2 °F (36 8 °C) (20)  Temp Source: Oral (20)  Respirations: 18 (20)  Weight - Scale: 68 kg (149 lb 14 6 oz) (20)  SpO2: 91 % (20)    Physical Exam   Constitutional: She appears well-developed and well-nourished  No distress  HENT:   Head: Normocephalic  Right Ear: External ear normal    Left Ear: External ear normal    Mouth/Throat: Oropharynx is clear and moist  No oropharyngeal exudate  Eyes: Pupils are equal, round, and reactive to light  Left eye exhibits chemosis, discharge and exudate  Left conjunctiva is injected  Right eye exhibits normal extraocular motion  Left eye exhibits abnormal extraocular motion  Left periorbital yellowish pustular rash   Neck: Neck supple  No JVD present  No neck stiffness or meningeal irritation signs   Cardiovascular: Normal rate, regular rhythm and normal heart sounds  No murmur heard  Pulmonary/Chest: Effort normal and breath sounds normal  No respiratory distress  She has no wheezes  She has no rales  Abdominal: Soft  Bowel sounds are normal  There is no tenderness  Musculoskeletal: She exhibits no edema  Neurological: She is alert  Oriented to self only (baseline)  Discoordination, jerky movement of both arms, strength 3/5 all over  Patient was able to squeeze my fingers due to discoordination      Skin: Skin is warm and dry  Capillary refill takes less than 2 seconds  She is not diaphoretic  Periorbital pustular rash and erythema around left eye with yellowish drainage and chemosis of the left eye   Nursing note and vitals reviewed  Additional Data:     Lab Results: I have personally reviewed pertinent reports  Results from last 7 days   Lab Units 01/24/20  1312   WBC Thousand/uL 7 86   HEMOGLOBIN g/dL 14 6   HEMATOCRIT % 44 7   PLATELETS Thousands/uL 302   NEUTROS PCT % 62   LYMPHS PCT % 18   MONOS PCT % 16*   EOS PCT % 2     Results from last 7 days   Lab Units 01/24/20  1312   POTASSIUM mmol/L 4 0   CHLORIDE mmol/L 102   CO2 mmol/L 30   BUN mg/dL 24   CREATININE mg/dL 1 39*   CALCIUM mg/dL 8 9   ALK PHOS U/L 99   ALT U/L 30   AST U/L 19     Results from last 7 days   Lab Units 01/24/20  1312   INR  1 01       Imaging: I have personally reviewed pertinent reports  Ct Orbits/temporal Bones/skull Base Wo Contrast    Result Date: 1/24/2020  Narrative: CT ORBITS WITHOUT CONTRAST INDICATION:   left eye infection vs zoster  COMPARISON: None TECHNIQUE: Axial CT imaging through the orbits was performed  In addition, sagittal and coronal reformatted images were submitted for interpretation  Radiation dose length product (DLP) for this visit:  274 81 mGy-cm   This examination, like all CT scans performed in the [facility], was performed utilizing techniques to minimize radiation dose exposure, including the use of iterative  reconstruction and automated exposure control  IMAGE QUALITY: Diagnostic  FINDINGS: ORBITS: Normal orbital structures  No mass or collection  SINUSES: Clear paranasal sinuses  Nasal septal deviation  SOFT TISSUES: Diffuse preseptal soft tissue swelling consistent with cellulitis  Adjacent periorbital edema  BONY STRUCTURES: Normal bony structures       Impression: Diffuse acute preseptal cellulitis with periorbital edema No intrinsic globe pathology identified Workstation performed: [id number]       EKG, Pathology, and Other Studies Reviewed on Admission:   CT orbits/temporal bones/skull base wo contrast: Diffuse acute preseptal cellulitis with periorbital edema    Epic / Care Everywhere Records Reviewed: Yes     ** Please Note: This note has been constructed using a voice recognition system   **

## 2020-01-25 NOTE — ASSESSMENT & PLAN NOTE
POA, left periorbital rash with yellowish pustules and yellowish discharge from the left eye with ecchymosis of the left eye for the last 4 days    Was associated with pain 1st day however patient denies any current pain    · CT orbits/temporal bones/skull base wo contrast: Diffuse acute preseptal cellulitis with periorbital edema  · Possibly bacterial periorbital cellulitis superimposed on shingles    Plan:  · Continue IV cefazolin for now  · Follow cultures

## 2020-01-25 NOTE — PROGRESS NOTES
Progress Note - Eriberto Desouza 1935, 81 y o  female MRN: [id number]    Unit/Bed#: S -01 Encounter: [id number]    Primary Care Provider: Pantera Jacobs DO   Date and time admitted to hospital: 1/24/2020  5:50 PM      Herpes zoster conjunctivitis of left eye  Assessment & Plan  Herpes Zoster ophthalmicus    · Continue IV acyclovir  · Ophthalmology evaluation pending  · Airborne and contact isolation    * Periorbital cellulitis of left eye  Assessment & Plan  POA, left periorbital rash with yellowish pustules and yellowish discharge from the left eye with ecchymosis of the left eye for the last 4 days  Was associated with pain 1st day however patient denies any current pain    · CT orbits/temporal bones/skull base wo contrast: Diffuse acute preseptal cellulitis with periorbital edema  · Possibly bacterial periorbital cellulitis superimposed on shingles    Plan:  · Continue IV cefazolin for now  · Follow cultures    Elevated serum creatinine with CKD stage 3  Assessment & Plan  Baseline creatinine around 1 1  Creatinine on admission 1 39    · Avoid hypotension/nephrotoxin (will hold lisinopril for now)  · Monitor I&Os  · Monitor BMP    Benign essential hypertension  Assessment & Plan  Blood pressure currently stable  · Hold lisinopril for now due to elevated creatinine  · Continue nebivolol  · Add hydralazine p r n    · Monitor blood pressure    Hyperlipidemia  Assessment & Plan  Continue Lipitor    [facility]  Assessment & Plan  Spoke with patient's daughter and son, they reported that patient has been having disorientation, jerky arms movement/tremors and slurred/incoherent speech for the last 6 months    · Continue home medication (RAZADYNE)    VTE Pharmacologic Prophylaxis:   Pharmacologic: Heparin  Mechanical VTE Prophylaxis in Place: Yes    Patient Centered Rounds: I have performed bedside rounds with nursing staff today      Discussions with Specialists or Other Care Team Provider:     Education and Discussions with Family / Patient:  Discussed with son and daughter at bedside    Time Spent for Care: 30 minutes  More than 50% of total time spent on counseling and coordination of care as described above  Current Length of Stay: 1 day(s)    Current Patient Status: Inpatient   Certification Statement: The patient will continue to require additional inpatient hospital stay due to Zoster infection    Discharge Plan:  Pending clinical improvement    Code Status: Level 3 - DNAR and DNI      Subjective:   Patient sitting in chair  No acute distress noted  Objective:     Vitals:   Temp (24hrs), Av 6 °F (37 °C), Min:97 7 °F (36 5 °C), Max:99 1 °F (37 3 °C)    Temp:  [97 7 °F (36 5 °C)-99 1 °F (37 3 °C)] 97 7 °F (36 5 °C)  HR:  [64-86] 86  Resp:  [17-20] 17  BP: (162-178)/(80-88) 162/88  SpO2:  [91 %-95 %] 92 %  Body mass index is 27 42 kg/m²  Input and Output Summary (last 24 hours): Intake/Output Summary (Last 24 hours) at 2020 1159  Last data filed at 2020 1120  Gross per 24 hour   Intake    Output 650 ml   Net -650 ml       Physical Exam:     Physical Exam     Gen -Patient comfortable at rest  Dermatomal rash around periorbital region on left  Some vesicles and crusted lesions  Neck- Supple  No thyromegaly or lymphadenopathy  Lungs-Clear bilaterally without any wheeze or rales   Heart S1-S2, regular rate and rhythm, no murmurs  Abdomen-soft nontender, no organomegaly   Bowel sounds present  Extremities-no cyanosi,  clubbing or edema  Skin- dermatomal rash as noted above  Neuro-awake alert and oriented x1     Additional Data:     Labs:    Results from last 7 days   Lab Units 20  0505   WBC Thousand/uL 6 44   HEMOGLOBIN g/dL 13 0   HEMATOCRIT % 40 4   PLATELETS Thousands/uL 282   NEUTROS PCT % 58   LYMPHS PCT % 19   MONOS PCT % 17*   EOS PCT % 4     Results from last 7 days   Lab Units 20  0505   SODIUM mmol/L 142 POTASSIUM mmol/L 3 5   CHLORIDE mmol/L 106   CO2 mmol/L 26   BUN mg/dL 21   CREATININE mg/dL 1 46*   ANION GAP mmol/L 10   CALCIUM mg/dL 8 5   ALBUMIN g/dL 2 7*   TOTAL BILIRUBIN mg/dL 0 37   ALK PHOS U/L 90   ALT U/L 21   AST U/L 22   GLUCOSE RANDOM mg/dL 92     Results from last 7 days   Lab Units 01/24/20  1312   INR  1 01                       * I Have Reviewed All Lab Data Listed Above  * Additional Pertinent Lab Tests Reviewed: All Labs Within Last 24 Hours Reviewed    Imaging:    Imaging Reports Reviewed Today Include:   Imaging Personally Reviewed by Myself Includes:      Recent Cultures (last 7 days):     Results from last 7 days   Lab Units 01/24/20  1312 01/24/20  1308   BLOOD CULTURE  Received in Microbiology Lab  Culture in Progress  Received in Microbiology Lab  Culture in Progress         Last 24 Hours Medication List:     Current Facility-Administered Medications:  acetaminophen 975 mg Oral Q6H PRN Dhruv Francisco MD    acyclovir 10 mg/kg (Ideal) Intravenous Q12H Donovan Bernal MD Last Rate: 500 mg (01/25/20 0400)   aspirin 81 mg Oral Daily Dhruv Francisco MD    atorvastatin 80 mg Oral Daily Dhruv Francisco MD    cefazolin 1,000 mg Intravenous Q12H Fatmata Barreto MD Last Rate: 1,000 mg (01/25/20 1120)   fluticasone 2 spray Nasal Daily Dhruv Francisco MD    galantamine 8 mg Oral BID With Meals Dhruv Francisco MD    heparin (porcine) 5,000 Units Subcutaneous Q8H [address] Dhruv Francisco MD    HYDROmorphone 0 2 mg Intravenous Q4H PRN Ethelytamika Francisco MD    LORazepam 1 mg Oral Q15 Min PRN Ethelytamika Francisco MD    naloxone 0 04 mg Intravenous Q1MIN PRN Ethelytamika Francisco MD    nebivolol 5 mg Oral Daily Ethelytamika Francisco MD    oxyCODONE 2 5 mg Oral Q4H PRN Ethelyn MD Maryam    oxyCODONE 5 mg Oral Q4H PRN Ethelyn MD Maryam    polyethylene glycol 17 g Oral Daily PRN Dhruv Francisco MD    traZODone 50 mg Oral HS Ethkatie Francisco MD         Today, Patient Was Seen By: Isabel Edge MD    ** Please Note: Dictation voice to text software may have been used in the creation of this document   **

## 2020-01-25 NOTE — ASSESSMENT & PLAN NOTE
Spoke with patient's daughter and son, they reported that patient has been having disorientation, jerky arms movement/tremors and slurred/incoherent speech for the last 6 months    · Continue home medication (Hui Book)

## 2020-01-26 LAB
ANION GAP SERPL CALCULATED.3IONS-SCNC: 10 MMOL/L (ref 4–13)
BUN SERPL-MCNC: 17 MG/DL (ref 5–25)
CALCIUM SERPL-MCNC: 8.5 MG/DL (ref 8.3–10.1)
CHLORIDE SERPL-SCNC: 104 MMOL/L (ref 100–108)
CO2 SERPL-SCNC: 24 MMOL/L (ref 21–32)
CREAT SERPL-MCNC: 1.28 MG/DL (ref 0.6–1.3)
ERYTHROCYTE [DISTWIDTH] IN BLOOD BY AUTOMATED COUNT: 13.2 % (ref 11.6–15.1)
GFR SERPL CREATININE-BSD FRML MDRD: 39 ML/MIN/1.73SQ M
GLUCOSE SERPL-MCNC: 96 MG/DL (ref 65–140)
HCT VFR BLD AUTO: 42.2 % (ref 34.8–46.1)
HGB BLD-MCNC: 14.1 G/DL (ref 11.5–15.4)
MCH RBC QN AUTO: 31.7 PG (ref 26.8–34.3)
MCHC RBC AUTO-ENTMCNC: 33.4 G/DL (ref 31.4–37.4)
MCV RBC AUTO: 95 FL (ref 82–98)
PLATELET # BLD AUTO: 258 THOUSANDS/UL (ref 149–390)
PMV BLD AUTO: 9.8 FL (ref 8.9–12.7)
POTASSIUM SERPL-SCNC: 3.4 MMOL/L (ref 3.5–5.3)
RBC # BLD AUTO: 4.45 MILLION/UL (ref 3.81–5.12)
SODIUM SERPL-SCNC: 138 MMOL/L (ref 136–145)
WBC # BLD AUTO: 7.46 THOUSAND/UL (ref 4.31–10.16)

## 2020-01-26 PROCEDURE — 99232 SBSQ HOSP IP/OBS MODERATE 35: CPT | Performed by: INTERNAL MEDICINE

## 2020-01-26 PROCEDURE — 85027 COMPLETE CBC AUTOMATED: CPT | Performed by: INTERNAL MEDICINE

## 2020-01-26 PROCEDURE — 80048 BASIC METABOLIC PNL TOTAL CA: CPT | Performed by: INTERNAL MEDICINE

## 2020-01-26 RX ADMIN — ASPIRIN 81 MG 81 MG: 81 TABLET ORAL at 09:38

## 2020-01-26 RX ADMIN — Medication 500 MG: at 02:51

## 2020-01-26 RX ADMIN — HEPARIN SODIUM 5000 UNITS: 5000 INJECTION INTRAVENOUS; SUBCUTANEOUS at 15:01

## 2020-01-26 RX ADMIN — HEPARIN SODIUM 5000 UNITS: 5000 INJECTION INTRAVENOUS; SUBCUTANEOUS at 21:05

## 2020-01-26 RX ADMIN — FLUTICASONE PROPIONATE 2 SPRAY: 50 SPRAY, METERED NASAL at 09:38

## 2020-01-26 RX ADMIN — CEFAZOLIN SODIUM 1000 MG: 1 SOLUTION INTRAVENOUS at 22:44

## 2020-01-26 RX ADMIN — HEPARIN SODIUM 5000 UNITS: 5000 INJECTION INTRAVENOUS; SUBCUTANEOUS at 06:09

## 2020-01-26 RX ADMIN — TOBRAMYCIN AND DEXAMETHASONE 1 DROP: 3; 1 SUSPENSION/ DROPS OPHTHALMIC at 18:38

## 2020-01-26 RX ADMIN — TOBRAMYCIN AND DEXAMETHASONE 1 DROP: 3; 1 SUSPENSION/ DROPS OPHTHALMIC at 06:09

## 2020-01-26 RX ADMIN — Medication 500 MG: at 15:02

## 2020-01-26 RX ADMIN — TRAZODONE HYDROCHLORIDE 50 MG: 50 TABLET ORAL at 21:06

## 2020-01-26 RX ADMIN — TOBRAMYCIN AND DEXAMETHASONE 1 DROP: 3; 1 SUSPENSION/ DROPS OPHTHALMIC at 22:45

## 2020-01-26 RX ADMIN — CEFAZOLIN SODIUM 1000 MG: 1 SOLUTION INTRAVENOUS at 10:42

## 2020-01-26 RX ADMIN — NEBIVOLOL HYDROCHLORIDE 5 MG: 5 TABLET ORAL at 09:38

## 2020-01-26 RX ADMIN — SODIUM CHLORIDE 75 ML/HR: 0.9 INJECTION, SOLUTION INTRAVENOUS at 15:01

## 2020-01-26 RX ADMIN — TOBRAMYCIN AND DEXAMETHASONE 1 DROP: 3; 1 SUSPENSION/ DROPS OPHTHALMIC at 09:38

## 2020-01-26 RX ADMIN — TOBRAMYCIN AND DEXAMETHASONE 1 DROP: 3; 1 SUSPENSION/ DROPS OPHTHALMIC at 15:02

## 2020-01-26 RX ADMIN — ATORVASTATIN CALCIUM 80 MG: 40 TABLET, FILM COATED ORAL at 09:38

## 2020-01-26 NOTE — ASSESSMENT & PLAN NOTE
Herpes Zoster ophthalmicus    · Continue IV acyclovir  · Ophthalmology evaluation noted  · Airborne and contact isolation

## 2020-01-26 NOTE — ASSESSMENT & PLAN NOTE
Baseline creatinine around 1 1    Creatinine improving    · Avoid hypotension/nephrotoxin (will hold lisinopril for now)  · Monitor I&Os  · Monitor BMP

## 2020-01-26 NOTE — ASSESSMENT & PLAN NOTE
Spoke with patient's daughter and son, they reported that patient has been having disorientation, jerky arms movement/tremors and slurred/incoherent speech for the last 6 months    · Continue home medication (Mason Arana)

## 2020-01-26 NOTE — PLAN OF CARE
Problem: Prexisting or High Potential for Compromised Skin Integrity  Goal: Skin integrity is maintained or improved  Description  INTERVENTIONS:  - Identify patients at risk for skin breakdown  - Assess and monitor skin integrity  - Assess and monitor nutrition and hydration status  - Monitor labs   - Assess for incontinence   - Turn and reposition patient  - Assist with mobility/ambulation  - Relieve pressure over bony prominences  - Avoid friction and shearing  - Provide appropriate hygiene as needed including keeping skin clean and dry  - Evaluate need for skin moisturizer/barrier cream  - Collaborate with interdisciplinary team   - Patient/family teaching  - Consider wound care consult   Outcome: Progressing     Problem: Potential for Falls  Goal: Patient will remain free of falls  Description  INTERVENTIONS:  - Assess patient frequently for physical needs  -  Identify cognitive and physical deficits and behaviors that affect risk of falls    -  [place] fall precautions as indicated by assessment   - Educate patient/family on patient safety including physical limitations  - Instruct patient to call for assistance with activity based on assessment  - Modify environment to reduce risk of injury  - Consider OT/PT consult to assist with strengthening/mobility  Outcome: Progressing

## 2020-01-26 NOTE — PROGRESS NOTES
Progress Note - Danica Honeycutt 1935, 81 y o  female MRN: [id number]    Unit/Bed#: S -01 Encounter: [id number]    Primary Care Provider: Marlo Landin DO   Date and time admitted to hospital: 1/24/2020  5:50 PM    Herpes zoster conjunctivitis of left eye  Assessment & Plan  Herpes Zoster ophthalmicus    · Continue IV acyclovir  · Ophthalmology evaluation noted  · Airborne and contact isolation    * Periorbital cellulitis of left eye  Assessment & Plan  POA, left periorbital rash with yellowish pustules and yellowish discharge from the left eye with ecchymosis of the left eye for the last 4 days  Was associated with pain 1st day however patient denies any current pain    · CT orbits/temporal bones/skull base wo contrast: Diffuse acute preseptal cellulitis with periorbital edema  · Possibly bacterial periorbital cellulitis superimposed on shingles    Plan:  · Continue IV cefazolin for now  · Follow cultures    Elevated serum creatinine with CKD stage 3  Assessment & Plan  Baseline creatinine around 1 1  Creatinine improving    · Avoid hypotension/nephrotoxin (will hold lisinopril for now)  · Monitor I&Os  · Monitor BMP    Benign essential hypertension  Assessment & Plan  Blood pressure currently stable  · Hold lisinopril for now due to elevated creatinine  · Continue nebivolol  · Add hydralazine p r n    · Monitor blood pressure    Hyperlipidemia  Assessment & Plan  Continue Lipitor    Late onset Alzheimer's disease without behavioral disturbance ([facility] [facility] )  Assessment & Plan  Spoke with patient's daughter and son, they reported that patient has been having disorientation, jerky arms movement/tremors and slurred/incoherent speech for the last 6 months    · Continue home medication (RAZADYNE)    VTE Pharmacologic Prophylaxis:   Pharmacologic: Heparin  Mechanical VTE Prophylaxis in Place: Yes    Patient Centered Rounds: I have performed bedside rounds with nursing staff today      Discussions with Specialists or Other Care Team Provider:     Education and Discussions with Family / Patient:  Updated family yesterday    Time Spent for Care: 30 minutes  More than 50% of total time spent on counseling and coordination of care as described above  Current Length of Stay: 2 day(s)    Current Patient Status: Inpatient   Certification Statement: The patient will continue to require additional inpatient hospital stay due to Cellulitis    Discharge Plan:  Next 24 hours    Code Status: Level 3 - DNAR and DNI      Subjective:   No events reported  Remains confused  Objective:     Vitals:   Temp (24hrs), Av 5 °F (36 9 °C), Min:97 8 °F (36 6 °C), Max:99 1 °F (37 3 °C)    Temp:  [97 8 °F (36 6 °C)-99 1 °F (37 3 °C)] 97 8 °F (36 6 °C)  HR:  [64-77] 64  Resp:  [16] 16  BP: (150-168)/(70-92) 168/92  SpO2:  [94 %] 94 %  Body mass index is 27 42 kg/m²  Input and Output Summary (last 24 hours): Intake/Output Summary (Last 24 hours) at 2020 1054  Last data filed at 2020 0500  Gross per 24 hour   Intake 550 ml   Output 1300 ml   Net -750 ml       Physical Exam:     Physical Exam     Gen -Patient comfortable in bed  Rash around left periorbital area extending to left side off scalp  Crusted lesions with yellowish discharge around eye leads  Crusting noted on nose  Neck- Supple  No thyromegaly or lymphadenopathy  Lungs-Clear bilaterally without any wheeze or rales   Heart S1-S2, regular rate and rhythm, no murmurs  Abdomen-soft nontender, no organomegaly   Bowel sounds present  Extremities-no cyanosi,  clubbing or edema  Skin- no rash  Neuro-awake and alert not oriented, intermittent jerky movements         Additional Data:     Labs:    Results from last 7 days   Lab Units 20  0603 20  0505   WBC Thousand/uL 7 46 6 44   HEMOGLOBIN g/dL 14 1 13 0   HEMATOCRIT % 42 2 40 4   PLATELETS Thousands/uL 258 282   NEUTROS PCT %  --  58   LYMPHS PCT %  --  19   MONOS PCT %  --  17*   EOS PCT %  --  4 Results from last 7 days   Lab Units 01/26/20  0603 01/25/20  0505   SODIUM mmol/L 138 142   POTASSIUM mmol/L 3 4* 3 5   CHLORIDE mmol/L 104 106   CO2 mmol/L 24 26   BUN mg/dL 17 21   CREATININE mg/dL 1 28 1 46*   ANION GAP mmol/L 10 10   CALCIUM mg/dL 8 5 8 5   ALBUMIN g/dL  --  2 7*   TOTAL BILIRUBIN mg/dL  --  0 37   ALK PHOS U/L  --  90   ALT U/L  --  21   AST U/L  --  22   GLUCOSE RANDOM mg/dL 96 92     Results from last 7 days   Lab Units 01/24/20  1312   INR  1 01                       * I Have Reviewed All Lab Data Listed Above  * Additional Pertinent Lab Tests Reviewed: All Labs Within Last 24 Hours Reviewed    Imaging:    Imaging Reports Reviewed Today Include:   Imaging Personally Reviewed by Myself Includes:      Recent Cultures (last 7 days):     Results from last 7 days   Lab Units 01/24/20  1312 01/24/20  1308   BLOOD CULTURE  No Growth at 24 hrs  No Growth at 24 hrs         Last 24 Hours Medication List:     Current Facility-Administered Medications:  acetaminophen 975 mg Oral Q6H PRN Ashleigh Johnson MD    acyclovir 10 mg/kg (Ideal) Intravenous Q12H Donovan Bernal MD Last Rate: 500 mg (01/26/20 0251)   aspirin 81 mg Oral Daily Ashleigh Johnson MD    atorvastatin 80 mg Oral Daily Ashleigh Johnson MD    cefazolin 1,000 mg Intravenous Q12H Fatmata Barreto MD Last Rate: 1,000 mg (01/26/20 1042)   fluticasone 2 spray Nasal Daily Ashleigh Johnson MD    galantamine 8 mg Oral BID With Meals Ashleigh Johnson MD    heparin (porcine) 5,000 Units Subcutaneous Q8H [address] Ashleigh Johnson MD    HYDROmorphone 0 2 mg Intravenous Q4H PRROGER Johnson MD    LORazepam 1 mg Oral Q15 Min PRN Ashleigh Johnson MD    naloxone 0 04 mg Intravenous Q1MIN PRN Ashleigh Johnson MD    nebivolol 5 mg Oral Daily Ashleigh Johnson MD    oxyCODONE 2 5 mg Oral Q4H PRN Ashleigh Johnson MD    oxyCODONE 5 mg Oral Q4H PRN Ashleigh Johnson MD    polyethylene glycol 17 g Oral Daily PRN Ashleigh Johnson MD    sodium chloride 75 mL/hr Intravenous Continuous Donovan Bernal MD Last Rate: 75 mL/hr (01/25/20 2146)   tobramycin-dexamethasone 1 drop Left Eye Q4H While Awake Fili Morse MD    traZODone 50 mg Oral HS Emerson Barney MD         Today, Patient Was Seen By: Louisa Perez MD    ** Please Note: Dictation voice to text software may have been used in the creation of this document   **

## 2020-01-26 NOTE — PLAN OF CARE
Problem: Prexisting or High Potential for Compromised Skin Integrity  Goal: Skin integrity is maintained or improved  Description  INTERVENTIONS:  - Identify patients at risk for skin breakdown  - Assess and monitor skin integrity  - Assess and monitor nutrition and hydration status  - Monitor labs   - Assess for incontinence   - Turn and reposition patient  - Assist with mobility/ambulation  - Relieve pressure over bony prominences  - Avoid friction and shearing  - Provide appropriate hygiene as needed including keeping skin clean and dry  - Evaluate need for skin moisturizer/barrier cream  - Collaborate with interdisciplinary team   - Patient/family teaching  - Consider wound care consult   Outcome: Progressing     Problem: Potential for Falls  Goal: Patient will remain free of falls  Description  INTERVENTIONS:  - Assess patient frequently for physical needs  -  Identify cognitive and physical deficits and behaviors that affect risk of falls    -  [place] fall precautions as indicated by assessment   - Educate patient/family on patient safety including physical limitations  - Instruct patient to call for assistance with activity based on assessment  - Modify environment to reduce risk of injury  - Consider OT/PT consult to assist with strengthening/mobility  Outcome: Progressing

## 2020-01-27 PROBLEM — N18.9 CKD (CHRONIC KIDNEY DISEASE): Status: ACTIVE | Noted: 2020-01-24

## 2020-01-27 PROBLEM — R26.2 AMBULATORY DYSFUNCTION: Status: ACTIVE | Noted: 2020-01-27

## 2020-01-27 PROBLEM — R13.10 DYSPHAGIA: Status: ACTIVE | Noted: 2020-01-27

## 2020-01-27 LAB
ANION GAP SERPL CALCULATED.3IONS-SCNC: 12 MMOL/L (ref 4–13)
BUN SERPL-MCNC: 13 MG/DL (ref 5–25)
CALCIUM SERPL-MCNC: 8.4 MG/DL (ref 8.3–10.1)
CHLORIDE SERPL-SCNC: 103 MMOL/L (ref 100–108)
CO2 SERPL-SCNC: 24 MMOL/L (ref 21–32)
CREAT SERPL-MCNC: 1.13 MG/DL (ref 0.6–1.3)
GFR SERPL CREATININE-BSD FRML MDRD: 45 ML/MIN/1.73SQ M
GLUCOSE SERPL-MCNC: 111 MG/DL (ref 65–140)
POTASSIUM SERPL-SCNC: 3.3 MMOL/L (ref 3.5–5.3)
SODIUM SERPL-SCNC: 139 MMOL/L (ref 136–145)

## 2020-01-27 PROCEDURE — 97163 PT EVAL HIGH COMPLEX 45 MIN: CPT

## 2020-01-27 PROCEDURE — 97167 OT EVAL HIGH COMPLEX 60 MIN: CPT

## 2020-01-27 PROCEDURE — 99232 SBSQ HOSP IP/OBS MODERATE 35: CPT | Performed by: INTERNAL MEDICINE

## 2020-01-27 PROCEDURE — 92610 EVALUATE SWALLOWING FUNCTION: CPT

## 2020-01-27 PROCEDURE — 80048 BASIC METABOLIC PNL TOTAL CA: CPT | Performed by: INTERNAL MEDICINE

## 2020-01-27 RX ORDER — POTASSIUM CHLORIDE 20 MEQ/1
40 TABLET, EXTENDED RELEASE ORAL ONCE
Status: COMPLETED | OUTPATIENT
Start: 2020-01-27 | End: 2020-01-27

## 2020-01-27 RX ORDER — GALANTAMINE HYDROBROMIDE 8 MG/1
8 TABLET, FILM COATED ORAL 2 TIMES DAILY WITH MEALS
Status: DISCONTINUED | OUTPATIENT
Start: 2020-01-27 | End: 2020-01-28

## 2020-01-27 RX ADMIN — Medication 500 MG: at 14:28

## 2020-01-27 RX ADMIN — GALANTAMINE 8 MG: 8 TABLET, FILM COATED ORAL at 19:18

## 2020-01-27 RX ADMIN — HEPARIN SODIUM 5000 UNITS: 5000 INJECTION INTRAVENOUS; SUBCUTANEOUS at 05:33

## 2020-01-27 RX ADMIN — TOBRAMYCIN AND DEXAMETHASONE 1 DROP: 3; 1 SUSPENSION/ DROPS OPHTHALMIC at 05:33

## 2020-01-27 RX ADMIN — TOBRAMYCIN AND DEXAMETHASONE 1 DROP: 3; 1 SUSPENSION/ DROPS OPHTHALMIC at 17:59

## 2020-01-27 RX ADMIN — HEPARIN SODIUM 5000 UNITS: 5000 INJECTION INTRAVENOUS; SUBCUTANEOUS at 22:31

## 2020-01-27 RX ADMIN — HEPARIN SODIUM 5000 UNITS: 5000 INJECTION INTRAVENOUS; SUBCUTANEOUS at 14:28

## 2020-01-27 RX ADMIN — TOBRAMYCIN AND DEXAMETHASONE 1 DROP: 3; 1 SUSPENSION/ DROPS OPHTHALMIC at 22:31

## 2020-01-27 RX ADMIN — ATORVASTATIN CALCIUM 80 MG: 40 TABLET, FILM COATED ORAL at 10:14

## 2020-01-27 RX ADMIN — NEBIVOLOL HYDROCHLORIDE 5 MG: 5 TABLET ORAL at 10:14

## 2020-01-27 RX ADMIN — SODIUM CHLORIDE 75 ML/HR: 0.9 INJECTION, SOLUTION INTRAVENOUS at 03:00

## 2020-01-27 RX ADMIN — FLUTICASONE PROPIONATE 2 SPRAY: 50 SPRAY, METERED NASAL at 10:15

## 2020-01-27 RX ADMIN — POTASSIUM CHLORIDE 40 MEQ: 1500 TABLET, EXTENDED RELEASE ORAL at 10:14

## 2020-01-27 RX ADMIN — TOBRAMYCIN AND DEXAMETHASONE 1 DROP: 3; 1 SUSPENSION/ DROPS OPHTHALMIC at 14:29

## 2020-01-27 RX ADMIN — ASPIRIN 81 MG 81 MG: 81 TABLET ORAL at 10:14

## 2020-01-27 RX ADMIN — CEFAZOLIN SODIUM 1000 MG: 1 SOLUTION INTRAVENOUS at 10:48

## 2020-01-27 RX ADMIN — TOBRAMYCIN AND DEXAMETHASONE 1 DROP: 3; 1 SUSPENSION/ DROPS OPHTHALMIC at 10:15

## 2020-01-27 RX ADMIN — CEFAZOLIN SODIUM 1000 MG: 1 SOLUTION INTRAVENOUS at 22:31

## 2020-01-27 RX ADMIN — Medication 500 MG: at 02:57

## 2020-01-27 RX ADMIN — TRAZODONE HYDROCHLORIDE 50 MG: 50 TABLET ORAL at 22:31

## 2020-01-27 NOTE — PLAN OF CARE
Rec dysphagia level 2 w/ thin liquids  Meds whole in puree/crush large pills  Full feed, aspiration precautions  Will cont to follow

## 2020-01-27 NOTE — PLAN OF CARE
Problem: Prexisting or High Potential for Compromised Skin Integrity  Goal: Skin integrity is maintained or improved  Description  INTERVENTIONS:  - Identify patients at risk for skin breakdown  - Assess and monitor skin integrity  - Assess and monitor nutrition and hydration status  - Monitor labs   - Assess for incontinence   - Turn and reposition patient  - Assist with mobility/ambulation  - Relieve pressure over bony prominences  - Avoid friction and shearing  - Provide appropriate hygiene as needed including keeping skin clean and dry  - Evaluate need for skin moisturizer/barrier cream  - Collaborate with interdisciplinary team   - Patient/family teaching  - Consider wound care consult   Outcome: Progressing     Problem: Potential for Falls  Goal: Patient will remain free of falls  Description  INTERVENTIONS:  - Assess patient frequently for physical needs  -  Identify cognitive and physical deficits and behaviors that affect risk of falls    -  [place] fall precautions as indicated by assessment   - Educate patient/family on patient safety including physical limitations  - Instruct patient to call for assistance with activity based on assessment  - Modify environment to reduce risk of injury  - Consider OT/PT consult to assist with strengthening/mobility  Outcome: Progressing     Problem: SKIN/TISSUE INTEGRITY - ADULT  Goal: Skin integrity remains intact  Description  INTERVENTIONS  - Identify patients at risk for skin breakdown  - Assess and monitor skin integrity  - Assess and monitor nutrition and hydration status  - Monitor labs (i e  albumin)  - Assess for incontinence   - Turn and reposition patient  - Assist with mobility/ambulation  - Relieve pressure over bony prominences  - Avoid friction and shearing  - Provide appropriate hygiene as needed including keeping skin clean and dry  - Evaluate need for skin moisturizer/barrier cream  - Collaborate with interdisciplinary team (i e  Nutrition, Rehabilitation, etc )   - Patient/family teaching  Outcome: Progressing  Goal: Incision(s), wounds(s) or drain site(s) healing without S/S of infection  Description  INTERVENTIONS  - Assess and document risk factors for skin impairment   - Assess and document dressing, incision, wound bed, drain sites and surrounding tissue  - Consider nutrition services referral as needed  - Oral mucous membranes remain intact  - Provide patient/ family education  Outcome: Progressing     Problem: PAIN - ADULT  Goal: Verbalizes/displays adequate comfort level or baseline comfort level  Description  Interventions:  - Encourage patient to monitor pain and request assistance  - Assess pain using appropriate pain scale  - Administer analgesics based on type and severity of pain and evaluate response  - Implement non-pharmacological measures as appropriate and evaluate response  - Consider cultural and social influences on pain and pain management  - Notify physician/advanced practitioner if interventions unsuccessful or patient reports new pain  Outcome: Progressing     Problem: INFECTION - ADULT  Goal: Absence or prevention of progression during hospitalization  Description  INTERVENTIONS:  - Assess and monitor for signs and symptoms of infection  - Monitor lab/diagnostic results  - Monitor all insertion sites, i e  indwelling lines, tubes, and drains  - Monitor endotracheal if appropriate and nasal secretions for changes in amount and color  - [place] appropriate cooling/warming therapies per order  - Administer medications as ordered  - Instruct and encourage patient and family to use good hand hygiene technique  - Identify and instruct in appropriate isolation precautions for identified infection/condition  Outcome: Progressing     Problem: SAFETY ADULT  Goal: Maintain or return to baseline ADL function  Description  INTERVENTIONS:  -  Assess patient's ability to carry out ADLs; assess patient's baseline for ADL function and identify physical deficits which impact ability to perform ADLs (bathing, care of mouth/teeth, toileting, grooming, dressing, etc )  - Assess/evaluate cause of self-care deficits   - Assess range of motion  - Assess patient's mobility; develop plan if impaired  - Assess patient's need for assistive devices and provide as appropriate  - Encourage maximum independence but intervene and supervise when necessary  - Involve family in performance of ADLs  - Assess for home care needs following discharge   - Consider OT consult to assist with ADL evaluation and planning for discharge  - Provide patient education as appropriate  Outcome: Progressing  Goal: Maintain or return mobility status to optimal level  Description  INTERVENTIONS:  - Assess patient's baseline mobility status (ambulation, transfers, stairs, etc )    - Identify cognitive and physical deficits and behaviors that affect mobility  - Identify mobility aids required to assist with transfers and/or ambulation (gait belt, sit-to-stand, lift, walker, cane, etc )  - [place] fall precautions as indicated by assessment  - Record patient progress and toleration of activity level on Mobility SBAR; progress patient to next Phase/Stage  - Instruct patient to call for assistance with activity based on assessment  - Consider rehabilitation consult to assist with strengthening/weightbearing, etc   Outcome: Progressing     Problem: DISCHARGE PLANNING  Goal: Discharge to home or other facility with appropriate resources  Description  INTERVENTIONS:  - Identify barriers to discharge w/patient and caregiver  - Arrange for needed discharge resources and transportation as appropriate  - Identify discharge learning needs (meds, wound care, etc )  - Arrange for interpretive services to assist at discharge as needed  - Refer to Case Management Department for coordinating discharge planning if the patient needs post-hospital services based on physician/advanced practitioner order or complex needs related to functional status, cognitive ability, or social support system  Outcome: Progressing     Problem: Knowledge Deficit  Goal: Patient/family/caregiver demonstrates understanding of disease process, treatment plan, medications, and discharge instructions  Description  Complete learning assessment and assess knowledge base    Interventions:  - Provide teaching at level of understanding  - Provide teaching via preferred learning methods  Outcome: Progressing

## 2020-01-27 NOTE — PROGRESS NOTES
Progress Note - Keiko Schmitz 1935, 81 y o  female MRN: [id number]    Unit/Bed#: S -01 Encounter: [id number]    Primary Care Provider: Kev Connell DO   Date and time admitted to hospital: 1/24/2020  5:50 PM        * Periorbital cellulitis of left eye  Assessment & Plan  POA, left periorbital rash with yellowish pustules and yellowish discharge from the left eye with ecchymosis of the left eye for the last 4 days  Was associated with pain 1st day however patient denies any current pain    · CT orbits/temporal bones/skull base wo contrast: Diffuse acute preseptal cellulitis with periorbital edema  · Possibly bacterial periorbital cellulitis superimposed on shingles    Plan:  · Continue IV cefazolin for now pending speech eval  · Consider switching to PO Kelfex tomorrow  · Follow cultures    Herpes zoster conjunctivitis of left eye  Assessment & Plan  Herpes Zoster ophthalmicus    · Continue IV acyclovir for now pending speech eval   · Ophthalmology evaluation noted  · Airborne and contact isolation    Ambulatory dysfunction  Assessment & Plan  Appreciate OT and PT eval   PT recommends short-term skilled PT, 24 hour supervision/assist, as son is not unable to care for patient at this moment  Dysphagia  Assessment & Plan  · Patient noted to be tolerating meals poorly  · Appreciate eval and recommendation    · Assessment limited due to patient's poor cooperation secondary to advanced dementia  · SL   Recommend level 2 diet and thin liquids   · Meds: whole with puree/crush large pills   · Frequent Oral care: 4x/day  · Aspiration precautions    CKD (chronic kidney disease)  Assessment & Plan  Baseline creatinine around 1 1    Creatinine improving    · Avoid hypotension/nephrotoxin (will hold lisinopril for now)  · Monitor I&Os  · Monitor BMP    KEYONNA (acute kidney injury) ([facility] [address] )  Assessment & Plan  Workup:   Present on admission (POA) ; admission creatinine: 1 46 (baseline around 1 1),  Creatinine now at 1 13 from  yesterday  · Resolved with IVF hydration  · Continue with IVF as patient is not tolerating meal well   NS at 75 ml/hr   Monitor BMP daily and observe for downward trend of creatinine   Avoid hypoperfusion of the kidneys, minimize nephrotoxins    Hyperlipidemia  Assessment & Plan  Continue Lipitor    Late onset Alzheimer's disease without behavioral disturbance ([facility] [address] )  Assessment & Plan  Spoke with patient's daughter and son, they reported that patient has been having disorientation, jerky arms movement/tremors and slurred/incoherent speech for the last 6 months    · Continue home medication (RAZADYNE)    Benign essential hypertension  Assessment & Plan  Blood pressure currently stable  · Continue to hold lisinopril for now due to elevated creatinine  · Continue nebivolol  · Add hydralazine p r n    · Monitor blood pressure          VTE Pharmacologic Prophylaxis:   Pharmacologic: Heparin  Mechanical VTE Prophylaxis in Place: Yes    Discussions with Specialists or Other Care Team Provider:  Nursing  Education and Discussions with Family / Patient:   Discussed with son at bedside  Current Length of Stay: 3 day(s)    Current Patient Status: Inpatient     Discharge Plan / Estimated Discharge Date:   Likely in the next 24-48 hours  Code Status: Level 3 - DNAR and DNI      Subjective:     Patient seen and examined at bedside  She does not appear to be in any acute distress or obvious pain  Review of systems limited due to advanced dementia  Objective:     Vitals:   Temp (24hrs), Av 3 °F (36 8 °C), Min:97 9 °F (36 6 °C), Max:98 9 °F (37 2 °C)    Temp:  [97 9 °F (36 6 °C)-98 9 °F (37 2 °C)] 97 9 °F (36 6 °C)  HR:  [65-68] 66  Resp:  [16-20] 18  BP: (140-175)/(60-89) 140/60  SpO2:  [91 %-93 %] 91 %  Body mass index is 27 42 kg/m²  Input and Output Summary (last 24 hours):        Intake/Output Summary (Last 24 hours) at 2020 1303  Last data filed at 1/27/2020 0501  Gross per 24 hour   Intake 360 ml   Output 1150 ml   Net -790 ml       Physical Exam:     Physical Exam   Constitutional: She appears well-developed and well-nourished  HENT:   Head: Normocephalic and atraumatic  Eyes: Pupils are equal, round, and reactive to light  EOM are normal    Yellow crusting noted around the eyelids  Injected conjuctiva  Open sores with crusting noted on a dermatomal  V1 distribution on the left forehead  Extraocular muscle movement appears normal      Cardiovascular: Normal rate, regular rhythm and normal heart sounds  No murmur heard  Pulmonary/Chest: Effort normal and breath sounds normal  She has no wheezes  She has no rales  Abdominal: Soft  Bowel sounds are normal  She exhibits no distension  There is no tenderness  Musculoskeletal: She exhibits no edema  Neurological: She is alert  She is disoriented  Skin: Skin is warm and dry  Rash noted  There is erythema  Psychiatric: Her mood appears anxious  Nursing note and vitals reviewed  Additional Data:     Labs:    Results from last 7 days   Lab Units 01/26/20  0603 01/25/20  0505   WBC Thousand/uL 7 46 6 44   HEMOGLOBIN g/dL 14 1 13 0   HEMATOCRIT % 42 2 40 4   PLATELETS Thousands/uL 258 282   NEUTROS PCT %  --  58   LYMPHS PCT %  --  19   MONOS PCT %  --  17*   EOS PCT %  --  4     Results from last 7 days   Lab Units 01/27/20  0602  01/25/20  0505   POTASSIUM mmol/L 3 3*   < > 3 5   CHLORIDE mmol/L 103   < > 106   CO2 mmol/L 24   < > 26   BUN mg/dL 13   < > 21   CREATININE mg/dL 1 13   < > 1 46*   CALCIUM mg/dL 8 4   < > 8 5   ALK PHOS U/L  --   --  90   ALT U/L  --   --  21   AST U/L  --   --  22    < > = values in this interval not displayed  Results from last 7 days   Lab Units 01/24/20  1312   INR  1 01       * I Have Reviewed All Lab Data Listed Above  * Additional Pertinent Lab Tests Reviewed:  Marnie [facility] Admission Reviewed    Imaging:    Imaging Reports Reviewed Today Include:   None  Imaging Personally Reviewed by Myself Includes:    None    Recent Cultures (last 7 days):     Results from last 7 days   Lab Units 01/24/20  1312 01/24/20  1308   BLOOD CULTURE  No Growth at 48 hrs  No Growth at 48 hrs  Last 24 Hours Medication List:     Current Facility-Administered Medications:  acetaminophen 975 mg Oral Q6H PRN Arsh Pollock MD    acyclovir 10 mg/kg (Ideal) Intravenous Q12H Donovan Bernal MD Last Rate: 500 mg (01/27/20 0257)   aspirin 81 mg Oral Daily Arsh Pollock MD    atorvastatin 80 mg Oral Daily Arsh Pollock MD    cefazolin 1,000 mg Intravenous Q12H Alpadriel Barreto MD Last Rate: 1,000 mg (01/27/20 1048)   fluticasone 2 spray Nasal Daily Arsh Pollock MD    galantamine 8 mg Oral BID With Meals Arsh Pollock MD    heparin (porcine) 5,000 Units Subcutaneous Q8H [facility] Arsh Pollock MD    HYDROmorphone 0 2 mg Intravenous Q4H PRN Arsh Pollock MD    LORazepam 1 mg Oral Q15 Min PRN Arsh Pollock MD    naloxone 0 04 mg Intravenous Q1MIN PRN Arsh Pollock MD    nebivolol 5 mg Oral Daily Arsh Pollock MD    oxyCODONE 2 5 mg Oral Q4H PRN Arsh Pollock MD    oxyCODONE 5 mg Oral Q4H PRN Arsh Pollock MD    polyethylene glycol 17 g Oral Daily PRN Arsh Pollock MD    sodium chloride 75 mL/hr Intravenous Continuous Donovan Bernla MD Last Rate: 75 mL/hr (01/27/20 0300)   tobramycin-dexamethasone 1 drop Left Eye Q4H While Awake Dedrick Lainez MD    traZODone 50 mg Oral HS Arsh Pollock MD         Today, Patient Was Seen By: Sussy Johnston MD    ** Please Note: This note has been constructed using a voice recognition system   **

## 2020-01-27 NOTE — PLAN OF CARE
Problem: PHYSICAL THERAPY ADULT  Goal: Performs mobility at highest level of function for planned discharge setting  See evaluation for individualized goals  Description  Treatment/Interventions: Functional transfer training, LE strengthening/ROM, Therapeutic exercise, Endurance training, Cognitive reorientation, Patient/family training, Equipment eval/education, Bed mobility(PT to see for gait training)          See flowsheet documentation for full assessment, interventions and recommendations  Note:   Prognosis: Fair  Problem List: Decreased strength, Decreased range of motion, Decreased endurance, Impaired balance, Decreased mobility, Decreased cognition, Decreased safety awareness, Impaired judgement  Assessment: Maureen Walker is a 82 y/o Female who [facility] on 1/24/2020 from her PCP's office with c/o periorbital cellulitis of L eye, with a diagnosis of periorbital cellulitis of L eye and herpes zoster  Received orders for PT eval and treat, with activity order(s) of up w/ A and fall precautions with contact and airborne precautions  This patient presents w/ comorbidities of Alzheimer's disease, HTN, HLD,  and has personal factors of advanced age, mobilizing w/ assistive device, stair(s) to enter home, communication issues, inability to navigate community distances, decreased cognition, limited home support, inability to perform IADLs and inability to perform ADLs  Patient presents with the following impairments at time of evaluation including: weakness, decreased endurance, impaired cognition, impaired balance, decreased safety awareness and fall risk  These impairments are evident in findings from the physical examination (weakness), mobility assessment (need for total A x 2 assist w/ all phases of mobility when usually mobilizing independently and inability to ambulate when usually ambulating w/ min assistance from son/caretaker), and objective measure(s) Barthel Index: 0/100   During session, pt needed input for task input and mobility technique/safety  Due to physical, safety awareness and cognition deficits, patient is at an increased risk for falls  This patient's clinical presentation is unstable/unpredictable, which is evident in need for assist w/ all phases of mobility when usually mobilizing independently and need for input for mobility technique/safety, decreased cognition impacting mobility  At this time patient would benefit from skilled inpatient PT in order to address abovementioned deficits in order to improve overall function and mobility  Discharge recommendation is for inpatient rehab and 24 hour supervision/care in order to reduce fall risk and maximize level of functional independence  Barriers to Discharge Comments: Barriers to Discharge home including: decreased cognition, need for 2 person skilled assist for bed mobility when usually performing bed mobility independently or with assist of 1, inability to ambulate safely when usually ambulating short distances, decreased home support (son physically unable to care for mother at this time)  Recommendation: Short-term skilled PT, 24 hour supervision/assist          See flowsheet documentation for full assessment

## 2020-01-27 NOTE — ASSESSMENT & PLAN NOTE
POA, left periorbital rash with yellowish pustules and yellowish discharge from the left eye with ecchymosis of the left eye for the last 4 days    Was associated with pain 1st day however patient denies any current pain    · CT orbits/temporal bones/skull base wo contrast: Diffuse acute preseptal cellulitis with periorbital edema  · Possibly bacterial periorbital cellulitis superimposed on shingles    Plan:  · Continue IV cefazolin for now pending speech eval  · Consider switching to PO Kelfex tomorrow  · Follow cultures

## 2020-01-27 NOTE — ASSESSMENT & PLAN NOTE
· Patient noted to be tolerating meals poorly  · Appreciate eval and recommendation    · Assessment limited due to patient's poor cooperation secondary to advanced dementia    · SL   Recommend level 2 diet and thin liquids   · Meds: whole with puree/crush large pills   · Frequent Oral care: 4x/day  · Aspiration precautions

## 2020-01-27 NOTE — PHYSICAL THERAPY NOTE
PHYSICAL THERAPY EVALUATION NOTE    Patient Name: Kristina Bowie  [id number] Date: 1/27/2020    AGE:   81 y o  Mrn:   [id number]  ADMIT DX:  Periorbital cellulitis of left eye [P57 [id number]]    Past Medical History:   Diagnosis Date    Alzheimer disease ([facility] ) 2018    Arthritis     Dementia ([facility] )     Encounter for screening for osteoporosis     HL (hearing loss)     Hyperlipidemia     Hypertension     Sciatica 2018     Length Of Stay: 3  PHYSICAL THERAPY EVALUATION :        01/27/20 1014   Note Type   Note type Eval only   Pain Assessment   Pain Assessment FLACC   Pain Score No Pain   Pain Rating: FLACC (Rest) - Face 0   Pain Rating: FLACC (Rest) - Legs 0   Pain Rating: FLACC (Rest) - Activity 0   Pain Rating: FLACC (Rest) - Cry 0   Pain Rating: FLACC (Rest) - Consolability 0   Score: FLACC (Rest) 0   Pain Rating: FLACC (Activity) - Face 0   Pain Rating: FLACC (Activity) - Legs 0   Pain Rating: FLACC (Activity) - Activity 0   Pain Rating: FLACC (Activity) - Cry 0   Pain Rating: FLACC (Activity) - Consolability 0   Score: FLACC (Activity) 0   Home Living   Type of Home House   Home Layout One level;Stairs to enter with rails  (2-3 JULIANNA)   Bathroom Toilet Standard   Home Equipment Walker   Prior Function   Level of [place] Needs assistance with ADLs and functional mobility; Needs assistance with IADLs   Lives With Son   Receives Help From Family   ADL Assistance Needs assistance   IADLs Needs assistance   Falls in the last 6 months 0   Restrictions/Precautions   Weight Bearing Precautions Per Order No   Other Precautions Contact/isolation; Airborne/isolation;Cognitive; Bed Alarm; Fall Risk;Multiple lines  ([place])   General   Additional Pertinent History Pt on airborne/contact precautions for herpes zoster   Family/Caregiver Present Yes  (Son is present)   Cognition   Overall Cognitive Status Impaired   Arousal/Participation Lethargic   Orientation Level Oriented to person;Disoriented to place; Disoriented to time;Disoriented to situation   Memory Decreased short term memory;Decreased recall of recent events;Decreased recall of precautions   Following Commands Follows one step commands inconsistently   Comments Pt supine in bed  She is agreeable to PT intervention  Pt unable to answer questions or follow commands, although son answers majority of questions despite being directed at the pt  She appears pleasantly confused   RLE Assessment   RLE Assessment X   Strength RLE   RLE Overall Strength 3/5  (pt unable to follow commands)   LLE Assessment   LLE Assessment X   Strength LLE   LLE Overall Strength 3/5  (pt unable to follow commands)   Coordination   Movements are Fluid and Coordinated 0   Coordination and Movement Description Pt with intermittent extremity tremors throughout session w/ various amplitudes  Also noted ridigity    Bed Mobility   Supine to Sit 1  Dependent   Additional items Assist x 2   Sit to Supine 1  Dependent   Additional items Assist x 2   Additional Comments Pt sat EOB x 3 min w/ total A x 2  Transfers   Sit to Stand Unable to assess  (Not appropriate)   Ambulation/Elevation   Gait pattern Not appropriate   Balance   Static Sitting Zero   Activity Tolerance   Activity Tolerance Patient limited by fatigue; Other (Comment)  (Treatment limited secondary to decreased cognition)   Medical Staff Made Aware Spoke to Radha Veras, Aida Reardon, Dr Cipriano Alexis + Dr Iris Adame to [place] Argueta   Assessment   Prognosis Fair   Problem List Decreased strength;Decreased range of motion;Decreased endurance; Impaired balance;Decreased mobility; Decreased cognition;Decreased safety awareness; Impaired judgement   Assessment Babar Campbell is a 82 y/o Female who [facility] to [facility] [place] on 1/24/2020 from her PCP's office with c/o periorbital cellulitis of L eye, with a diagnosis of periorbital cellulitis of L eye and herpes zoster   Received orders for PT eval and treat, with activity order(s) of up w/ A and fall precautions with contact and airborne precautions  This patient presents w/ comorbidities of Alzheimer's disease, HTN, HLD,  and has personal factors of advanced age, mobilizing w/ assistive device, stair(s) to enter home, communication issues, inability to navigate community distances, decreased cognition, limited home support, inability to perform IADLs and inability to perform ADLs  Patient presents with the following impairments at time of evaluation including: weakness, decreased endurance, impaired cognition, impaired balance, decreased safety awareness and fall risk  These impairments are evident in findings from the physical examination (weakness), mobility assessment (need for total A x 2 assist w/ all phases of mobility when usually mobilizing independently and inability to ambulate when usually ambulating w/ min assistance from son/caretaker), and objective measure(s) Barthel Index: 0/100  During session, pt needed input for task input and mobility technique/safety  Due to physical, safety awareness and cognition deficits, patient is at an increased risk for falls  This patient's clinical presentation is unstable/unpredictable, which is evident in need for assist w/ all phases of mobility when usually mobilizing independently and need for input for mobility technique/safety, decreased cognition impacting mobility  At this time patient would benefit from skilled inpatient PT in order to address abovementioned deficits in order to improve overall function and mobility  Discharge recommendation is for inpatient rehab and 24 hour supervision/care in order to reduce fall risk and maximize level of functional independence      Barriers to Discharge Comments Barriers to Discharge home including: decreased cognition, need for 2 person skilled assist for bed mobility when usually performing bed mobility independently or with assist of 1, inability to ambulate safely when usually ambulating short distances, decreased home support (son physically unable to care for mother at this time)   Goals   Patient Goals none expressed, unable to express   STG Expiration Date 02/06/20   Short Term Goal #1 Patient will: Increase bilateral LE strength 1/2 grade to facilitate independent mobility, Perform all bed mobility tasks w/ mod -> minx1 to decrease fall risk factors, Perform all transfers w/ w/ mod -> minx1 to improve independence, Tolerate 3 hr OOB to faciliate upright tolerance and Improve Barthel Index score to 25 or greater to facilitate independence, & PT to assess mobility/OOB function when appropriate   PT Treatment Day 0   Plan   Treatment/Interventions Functional transfer training;LE strengthening/ROM; Therapeutic exercise; Endurance training;Cognitive reorientation;Patient/family training;Equipment eval/education; Bed mobility  (PT to see for gait training)   PT Frequency 2-3x/wk   Recommendation   Recommendation Short-term skilled PT;24 hour supervision/assist   Additional Comments At this time, pt requires mechanical lift or hovermat w/ shuttle chair for OOB   Barthel Index   Feeding 0   Bathing 0   Grooming Score 0   Dressing Score 0   Bladder Score 0   Bowels Score 0   Toilet Use Score 0   Transfers (Bed/Chair) Score 0   Mobility (Level Surface) Score 0   Stairs Score 0   Barthel Index Score 0     Skilled PT recommended while in hospital and upon DC to progress pt toward treatment goals       Cheryle Kay, PT

## 2020-01-27 NOTE — ASSESSMENT & PLAN NOTE
Spoke with patient's daughter and son, they reported that patient has been having disorientation, jerky arms movement/tremors and slurred/incoherent speech for the last 6 months    · Continue home medication (Tracey Prescott)

## 2020-01-27 NOTE — ASSESSMENT & PLAN NOTE
Herpes Zoster ophthalmicus    · Continue IV acyclovir for now pending speech eval   · Ophthalmology evaluation noted  · Airborne and contact isolation

## 2020-01-27 NOTE — SPEECH THERAPY NOTE
Speech Language/Pathology    Speech-Language Pathology Bedside Swallow Evaluation        Patient Name: Eriberto Desouza    PQTKX'[id number] Date: 1/27/2020     Problem List  Principal Problem:    Periorbital cellulitis of left eye  Active Problems:    Benign essential hypertension    Late onset Alzheimer's disease without behavioral disturbance (HCC)    Hyperlipidemia    Herpes zoster conjunctivitis of left eye    Elevated serum creatinine with CKD stage 3         Past Medical History  Past Medical History:   Diagnosis Date    Alzheimer disease ([facility] [address] ) 2018    Arthritis     Dementia ([facility] )     Encounter for screening for osteoporosis     HL (hearing loss)     Hyperlipidemia     Hypertension     Sciatica 2018       Past Surgical History  Past Surgical History:   Procedure Laterality Date    SPINE SURGERY  1980    lumbar surgery for pinched nerve        Summary    Limited assessment 2/2 pt reduced cooperation  Pt presents with moderate oral and suspected functional pharyngeal stages of swallow  Poor attention to task/bolus awareness  Mastication was slow/disorganized w/ no attempt to transfer regular solid food, but effective with soft solid  Wet vocal quality x1 w/ initial sip of thin, though not repeated w/ additional trials  Recommendations:   Diet: mechanically altered/level 2 diet and thin liquids   Meds: whole with puree/crush large pills   Frequent Oral care: 4x/day  Aspiration precautions and compensatory swallowing strategies: full feed, upright posture, only feed when fully alert, slow rate of feeding and small bites/sips  Other Recommendations/ considerations: ST will cont to follow to assess tolerance of diet preferably at full meal      Current Medical Status  Pt is a 81 y o  female who presented to [facility] with periorbital cellulitis of left eye    Patient has history of significant dementia and according to the patient's son and daughter at bedside, she has baseline confusion and disorientation and at times jibberish speech since 2018  Patient's children also told me that patient had occasional tremors/jerks since about 6 months ago  Pt presented w/ with preseptal cellulitis and then herpes zoster ophthalmicus at the [facility] on 01/24  She was transferred to [address] [place] [address] for further care and ophthalmic evaluation  ST consulted for swallow evaluation as son reports pt has difficulty chewing/takes a long time to ea her food  No family was present during ST evaluation  Per RN, pt has been tolerating regular diet/thin liquids (though reports feeding softer foods - I e  Oatmeal) and taking pills whole in puree w/o difficulty       Past medical history:   Please see H&P for details    Special Studies:  MRI brain pending    Social/Education/Vocational Hx:  Pt lives home w/ son with 24-hour caregiver     Swallow Information   Current Risks for Dysphagia & Aspiration: dementia  Current Symptoms/Concerns: difficulty chewing per son  Current Diet: regular diet and thin liquids   Baseline Diet: regular diet and thin liquids  Takes pills-per RN, whole w/ puree - taking pills w/o difficulty     Baseline Assessment   Behavior/Cognition: alert and decreased attention  Speech/Language Status: not able to to follow commands and limited verbal output (speech slurred/nonsensical)   Patient Positioning: upright in bed     Swallow Mechanism Exam   Facial: symmetrical  Labial: unable to test 2/2 limited command following  Lingual: unable to test 2/2 limited command following  Velum: unable to visualize  Mandible:  decreased ROM  Dentition: upper dentures - no lower dentures present in room - pt responded "no" when asked if she wears lower dentures, however pt is poor historian   Vocal quality:clear/adequate   Volitional Cough: unable to initiate volitional cough   Respiratory: RA    Consistencies Assessed and Performance   Consistencies Administered: puree, soft solid, chopped pear, shay cracker, thin liquids by straw (limited amounts trialed with each consistency as pt w/ reduced cooperation during eval)     Oral Stage: Poor awareness to task/retrieval from spoon and straw with reduced oral opening  Anterior loss with puree  Slow draw from straw requiring frequent verbal cues to attend to task  Pt spit applesauce out of oral cavity w/ facial grimacing (suspect 2/2 to taste)  Pt took oatmeal w/ slow but functional manipulation  Disorganized, but functional mastication with soft solid  Slow/decreased mastication w/ regular solid with no attempt to transfer bolus - use of liquid wash to clear  Cannot rule out reduced oral control w/ thin liquids  Pharyngeal Stage: Swallow initiation appeared timely  Laryngeal rise present to palpation  Wet vocal quality x1 following initial sip of thin liquids  No overt s/s aspiration following additional trials of thin or w/ other consistencies         Esophageal Concerns: none reported      Results Reviewed with: patient(no evidence of learning) and RN   Dysphagia Goals: pt will tolerate dysphagia level 2 with thin liquids without s/s of aspiration x48 hours

## 2020-01-27 NOTE — PLAN OF CARE
Problem: OCCUPATIONAL THERAPY ADULT  Goal: Performs self-care activities at highest level of function for planned discharge setting  See evaluation for individualized goals  Description  Treatment Interventions: ADL retraining, Functional transfer training, UE strengthening/ROM, Endurance training, Cognitive reorientation, Patient/family training, Compensatory technique education          See flowsheet documentation for full assessment, interventions and recommendations  Note:   Limitation: Decreased ADL status, Decreased Safe judgement during ADL, Decreased cognition, Decreased endurance, Decreased self-care trans, Decreased high-level ADLs     Assessment: Pt is a 81 y o  female seen for OT evaluation s/p admit to [facility] on 1/24/2020 w/ Periorbital cellulitis of left eye  Pt is currently on contact and airborne precautions  Comorbidities affecting pt's functional performance at time of assessment include: HTN, alzheimers disease, CKD, urinary incontinence, falls  Personal factors affecting pt at time of IE include:steps to enter environment, difficulty performing ADLS, difficulty performing IADLS , limited insight into deficits and decreased initiation and engagement   Prior to admission, pt was living at home w her son as well as a 24/7 live in aide  She was apparently able to walk, but needing assist w self care due to her dementia  Son reports pt being unable to feed herself the past 3-4 weeks  Upon evaluation: Pt requires total assist of 2 for bedmobiltiy, dependent for all self care  She does not follow any commands (very inconsistent), is basically non-verbal  Dependent x 2 for sitting at EOB, poor/zero balance, jerky movements, tremors, rigid  Son asked to get patient up to the chair  Therapist explained it is currently not safe as patient follows zero commands, jerky movements and zero balance at EOB    decreased strength, decreased balance, decreased tolerance, impaired attention, impaired initiation, impaired memory, impaired sequencing, impaired problem solving and decreased safety awareness  Pt to benefit from trial of  skilled OT tx while in the hospital to address deficits as defined above and maximize level of functional independence w ADL's and functional mobility  Occupational Performance areas to address include: eating, grooming, functional mobility and assist w safest dc recommendation  Pt scored   0/100 on the barthel index  Based on findings from OT evaluation and functional performance deficits, pt has been identified as a  High complexity evaluation   From OT standpoint, recommendation at time of d/c would be short term rehab vs 24/7 assist vs LTC      OT Discharge Recommendation: 24 hour supervision/assist(vs rehab vs 24/care)

## 2020-01-27 NOTE — PLAN OF CARE
Problem: Prexisting or High Potential for Compromised Skin Integrity  Goal: Skin integrity is maintained or improved  Description  INTERVENTIONS:  - Identify patients at risk for skin breakdown  - Assess and monitor skin integrity  - Assess and monitor nutrition and hydration status  - Monitor labs   - Assess for incontinence   - Turn and reposition patient  - Assist with mobility/ambulation  - Relieve pressure over bony prominences  - Avoid friction and shearing  - Provide appropriate hygiene as needed including keeping skin clean and dry  - Evaluate need for skin moisturizer/barrier cream  - Collaborate with interdisciplinary team   - Patient/family teaching  - Consider wound care consult   Outcome: Progressing     Problem: Potential for Falls  Goal: Patient will remain free of falls  Description  INTERVENTIONS:  - Assess patient frequently for physical needs  -  Identify cognitive and physical deficits and behaviors that affect risk of falls    -  [place] fall precautions as indicated by assessment   - Educate patient/family on patient safety including physical limitations  - Instruct patient to call for assistance with activity based on assessment  - Modify environment to reduce risk of injury  - Consider OT/PT consult to assist with strengthening/mobility  Outcome: Progressing     Problem: SKIN/TISSUE INTEGRITY - ADULT  Goal: Skin integrity remains intact  Description  INTERVENTIONS  - Identify patients at risk for skin breakdown  - Assess and monitor skin integrity  - Assess and monitor nutrition and hydration status  - Monitor labs (i e  albumin)  - Assess for incontinence   - Turn and reposition patient  - Assist with mobility/ambulation  - Relieve pressure over bony prominences  - Avoid friction and shearing  - Provide appropriate hygiene as needed including keeping skin clean and dry  - Evaluate need for skin moisturizer/barrier cream  - Collaborate with interdisciplinary team (i e  Nutrition, Rehabilitation, etc )   - Patient/family teaching  Outcome: Progressing  Goal: Incision(s), wounds(s) or drain site(s) healing without S/S of infection  Description  INTERVENTIONS  - Assess and document risk factors for skin impairment   - Assess and document dressing, incision, wound bed, drain sites and surrounding tissue  - Consider nutrition services referral as needed  - Oral mucous membranes remain intact  - Provide patient/ family education  Outcome: Progressing     Problem: PAIN - ADULT  Goal: Verbalizes/displays adequate comfort level or baseline comfort level  Description  Interventions:  - Encourage patient to monitor pain and request assistance  - Assess pain using appropriate pain scale  - Administer analgesics based on type and severity of pain and evaluate response  - Implement non-pharmacological measures as appropriate and evaluate response  - Consider cultural and social influences on pain and pain management  - Notify physician/advanced practitioner if interventions unsuccessful or patient reports new pain  Outcome: Progressing     Problem: INFECTION - ADULT  Goal: Absence or prevention of progression during hospitalization  Description  INTERVENTIONS:  - Assess and monitor for signs and symptoms of infection  - Monitor lab/diagnostic results  - Monitor all insertion sites, i e  indwelling lines, tubes, and drains  - Monitor endotracheal if appropriate and nasal secretions for changes in amount and color  - Venus appropriate cooling/warming therapies per order  - Administer medications as ordered  - Instruct and encourage patient and family to use good hand hygiene technique  - Identify and instruct in appropriate isolation precautions for identified infection/condition  Outcome: Progressing     Problem: SAFETY ADULT  Goal: Maintain or return to baseline ADL function  Description  INTERVENTIONS:  -  Assess patient's ability to carry out ADLs; assess patient's baseline for ADL function and identify physical deficits which impact ability to perform ADLs (bathing, care of mouth/teeth, toileting, grooming, dressing, etc )  - Assess/evaluate cause of self-care deficits   - Assess range of motion  - Assess patient's mobility; develop plan if impaired  - Assess patient's need for assistive devices and provide as appropriate  - Encourage maximum independence but intervene and supervise when necessary  - Involve family in performance of ADLs  - Assess for home care needs following discharge   - Consider OT consult to assist with ADL evaluation and planning for discharge  - Provide patient education as appropriate  Outcome: Progressing  Goal: Maintain or return mobility status to optimal level  Description  INTERVENTIONS:  - Assess patient's baseline mobility status (ambulation, transfers, stairs, etc )    - Identify cognitive and physical deficits and behaviors that affect mobility  - Identify mobility aids required to assist with transfers and/or ambulation (gait belt, sit-to-stand, lift, walker, cane, etc )  - [place] fall precautions as indicated by assessment  - Record patient progress and toleration of activity level on Mobility SBAR; progress patient to next Phase/Stage  - Instruct patient to call for assistance with activity based on assessment  - Consider rehabilitation consult to assist with strengthening/weightbearing, etc   Outcome: Progressing     Problem: DISCHARGE PLANNING  Goal: Discharge to home or other facility with appropriate resources  Description  INTERVENTIONS:  - Identify barriers to discharge w/patient and caregiver  - Arrange for needed discharge resources and transportation as appropriate  - Identify discharge learning needs (meds, wound care, etc )  - Arrange for interpretive services to assist at discharge as needed  - Refer to Case Management Department for coordinating discharge planning if the patient needs post-hospital services based on physician/advanced practitioner order or complex needs related to functional status, cognitive ability, or social support system  Outcome: Progressing     Problem: Knowledge Deficit  Goal: Patient/family/caregiver demonstrates understanding of disease process, treatment plan, medications, and discharge instructions  Description  Complete learning assessment and assess knowledge base    Interventions:  - Provide teaching at level of understanding  - Provide teaching via preferred learning methods  Outcome: Progressing

## 2020-01-27 NOTE — ASSESSMENT & PLAN NOTE
Workup:   Present on admission (POA) ; admission creatinine: 1 46 (baseline around 1 1),    Creatinine now at 1 13 from 1 28 yesterday    · Resolved with IVF hydration  · Continue with IVF as patient is not tolerating meal well   NS at 75 ml/hr   Monitor BMP daily and observe for downward trend of creatinine   Avoid hypoperfusion of the kidneys, minimize nephrotoxins

## 2020-01-27 NOTE — ASSESSMENT & PLAN NOTE
Appreciate OT and PT eval   PT recommends short-term skilled PT, 24 hour supervision/assist, as son is not unable to care for patient at this moment

## 2020-01-27 NOTE — ASSESSMENT & PLAN NOTE
Blood pressure currently stable      · Continue to hold lisinopril for now due to elevated creatinine  · Continue nebivolol  · Add hydralazine p r n    · Monitor blood pressure

## 2020-01-28 LAB
ANION GAP SERPL CALCULATED.3IONS-SCNC: 11 MMOL/L (ref 4–13)
BUN SERPL-MCNC: 11 MG/DL (ref 5–25)
CALCIUM SERPL-MCNC: 7.8 MG/DL (ref 8.3–10.1)
CHLORIDE SERPL-SCNC: 103 MMOL/L (ref 100–108)
CO2 SERPL-SCNC: 23 MMOL/L (ref 21–32)
CREAT SERPL-MCNC: 0.98 MG/DL (ref 0.6–1.3)
GFR SERPL CREATININE-BSD FRML MDRD: 54 ML/MIN/1.73SQ M
GLUCOSE SERPL-MCNC: 112 MG/DL (ref 65–140)
POTASSIUM SERPL-SCNC: 3.4 MMOL/L (ref 3.5–5.3)
SODIUM SERPL-SCNC: 137 MMOL/L (ref 136–145)

## 2020-01-28 PROCEDURE — 80048 BASIC METABOLIC PNL TOTAL CA: CPT | Performed by: INTERNAL MEDICINE

## 2020-01-28 PROCEDURE — 99232 SBSQ HOSP IP/OBS MODERATE 35: CPT | Performed by: INTERNAL MEDICINE

## 2020-01-28 RX ORDER — LISINOPRIL 10 MG/1
10 TABLET ORAL DAILY
Status: DISCONTINUED | OUTPATIENT
Start: 2020-01-28 | End: 2020-02-03 | Stop reason: HOSPADM

## 2020-01-28 RX ORDER — HYDRALAZINE HYDROCHLORIDE 20 MG/ML
5 INJECTION INTRAMUSCULAR; INTRAVENOUS EVERY 6 HOURS PRN
Status: DISCONTINUED | OUTPATIENT
Start: 2020-01-28 | End: 2020-02-03 | Stop reason: HOSPADM

## 2020-01-28 RX ORDER — GALANTAMINE HYDROBROMIDE 8 MG/1
8 TABLET, FILM COATED ORAL 2 TIMES DAILY WITH MEALS
Status: DISCONTINUED | OUTPATIENT
Start: 2020-01-29 | End: 2020-02-03 | Stop reason: HOSPADM

## 2020-01-28 RX ORDER — POTASSIUM CHLORIDE 20 MEQ/1
40 TABLET, EXTENDED RELEASE ORAL ONCE
Status: COMPLETED | OUTPATIENT
Start: 2020-01-28 | End: 2020-01-28

## 2020-01-28 RX ADMIN — ASPIRIN 81 MG 81 MG: 81 TABLET ORAL at 09:48

## 2020-01-28 RX ADMIN — Medication 500 MG: at 15:33

## 2020-01-28 RX ADMIN — FLUTICASONE PROPIONATE 2 SPRAY: 50 SPRAY, METERED NASAL at 09:51

## 2020-01-28 RX ADMIN — HEPARIN SODIUM 5000 UNITS: 5000 INJECTION INTRAVENOUS; SUBCUTANEOUS at 15:27

## 2020-01-28 RX ADMIN — TOBRAMYCIN AND DEXAMETHASONE 1 DROP: 3; 1 SUSPENSION/ DROPS OPHTHALMIC at 15:30

## 2020-01-28 RX ADMIN — HEPARIN SODIUM 5000 UNITS: 5000 INJECTION INTRAVENOUS; SUBCUTANEOUS at 22:51

## 2020-01-28 RX ADMIN — CEFAZOLIN SODIUM 1000 MG: 1 SOLUTION INTRAVENOUS at 22:40

## 2020-01-28 RX ADMIN — TOBRAMYCIN AND DEXAMETHASONE 1 DROP: 3; 1 SUSPENSION/ DROPS OPHTHALMIC at 09:51

## 2020-01-28 RX ADMIN — CEFAZOLIN SODIUM 1000 MG: 1 SOLUTION INTRAVENOUS at 12:15

## 2020-01-28 RX ADMIN — TRAZODONE HYDROCHLORIDE 50 MG: 50 TABLET ORAL at 22:49

## 2020-01-28 RX ADMIN — NEBIVOLOL HYDROCHLORIDE 5 MG: 5 TABLET ORAL at 09:48

## 2020-01-28 RX ADMIN — HEPARIN SODIUM 5000 UNITS: 5000 INJECTION INTRAVENOUS; SUBCUTANEOUS at 04:27

## 2020-01-28 RX ADMIN — Medication 500 MG: at 04:12

## 2020-01-28 RX ADMIN — GALANTAMINE 8 MG: 8 TABLET, FILM COATED ORAL at 09:49

## 2020-01-28 RX ADMIN — SODIUM CHLORIDE 75 ML/HR: 0.9 INJECTION, SOLUTION INTRAVENOUS at 12:36

## 2020-01-28 RX ADMIN — TOBRAMYCIN AND DEXAMETHASONE 1 DROP: 3; 1 SUSPENSION/ DROPS OPHTHALMIC at 04:28

## 2020-01-28 RX ADMIN — GALANTAMINE 8 MG: 8 TABLET, FILM COATED ORAL at 15:31

## 2020-01-28 RX ADMIN — ATORVASTATIN CALCIUM 80 MG: 40 TABLET, FILM COATED ORAL at 09:48

## 2020-01-28 RX ADMIN — POTASSIUM CHLORIDE 40 MEQ: 1500 TABLET, EXTENDED RELEASE ORAL at 09:48

## 2020-01-28 RX ADMIN — TOBRAMYCIN AND DEXAMETHASONE 1 DROP: 3; 1 SUSPENSION/ DROPS OPHTHALMIC at 22:51

## 2020-01-28 RX ADMIN — TOBRAMYCIN AND DEXAMETHASONE 1 DROP: 3; 1 SUSPENSION/ DROPS OPHTHALMIC at 17:54

## 2020-01-28 RX ADMIN — SODIUM CHLORIDE 75 ML/HR: 0.9 INJECTION, SOLUTION INTRAVENOUS at 22:40

## 2020-01-28 RX ADMIN — HYDRALAZINE HYDROCHLORIDE 5 MG: 20 INJECTION INTRAMUSCULAR; INTRAVENOUS at 23:23

## 2020-01-28 NOTE — PROGRESS NOTES
Progress Note - [facility], 81 y o  female MRN: [id number]    Unit/Bed#: S -01 Encounter: [id number]    Primary Care Provider: Veronica Ríos DO   Date and time admitted to hospital: 1/24/2020  5:50 PM        * Periorbital cellulitis of left eye  Assessment & Plan  POA, left periorbital rash with yellowish pustules and yellowish discharge from the left eye with ecchymosis of the left eye for the last 4 days  Was associated with pain 1st day however patient denies any current pain    · CT orbits/temporal bones/skull base wo contrast: Diffuse acute preseptal cellulitis with periorbital edema  · Possibly bacterial periorbital cellulitis superimposed on shingles  · Blood cultures negative at 4 days  Plan:  · Continue with IV Ancef  · Complete a total of 7 days antibiotic treatment  Herpes zoster conjunctivitis of left eye  Assessment & Plan  Herpes Zoster ophthalmicus    · Continue IV acyclovir  · Ophthalmology evaluation noted  · Airborne and contact isolation  · Complete a total 7 days of antiviral treatment    Ambulatory dysfunction  Assessment & Plan  Appreciate OT and PT eval   PT recommends short-term skilled PT, 24 hour supervision/assist, as son is not unable to care for patient at this moment  Dysphagia  Assessment & Plan  · Patient noted to be tolerating meals poorly  · Appreciate eval and recommendation    · Assessment limited due to patient's poor cooperation secondary to advanced dementia  · SL   Recommend level 2 diet and thin liquids   · Meds: whole with puree/crush large pills   · Frequent Oral care: 4x/day  · Aspiration precautions    CKD (chronic kidney disease)  Assessment & Plan  Baseline creatinine around 1 1  Creatinine improving    · Avoid hypotension/nephrotoxin   · Monitor I&Os  · Monitor BMP    KEYONNA (acute kidney injury) ([facility] [address] )  Assessment & Plan  Workup:   Present on admission (POA) ; admission creatinine: 1 46 (baseline around 1 1),    Improved  · Resolved with IVF hydration  · Continue with IVF as patient is not tolerating meals well   NS at 75 ml/hr   Monitor BMP daily and observe for downward trend of creatinine   Avoid hypoperfusion of the kidneys, minimize nephrotoxins    Hyperlipidemia  Assessment & Plan  Continue Lipitor    Late onset Alzheimer's disease without behavioral disturbance ([facility] [address] )  Assessment & Plan  Spoke with patient's daughter and son, they reported that patient has been having disorientation, jerky arms movement/tremors and slurred/incoherent speech for the last 6 months    · Continue home medication (RAZADYNE)    Benign essential hypertension  Assessment & Plan  Blood pressure currently stable  · Will restart home lisinopril  · Continue nebivolol  · Add hydralazine p r n    · Monitor blood pressure          VTE Pharmacologic Prophylaxis:   Pharmacologic: Heparin  Mechanical VTE Prophylaxis in Place: Yes    Discussions with Specialists or Other Care Team Provider:  Nursing    Education and Discussions with Family / Patient:  Attempted to call son  However, call went unanswered  Will attempt again tomorrow  Current Length of Stay: 4 day(s)    Current Patient Status: Inpatient     Discharge Plan / Estimated Discharge Date:  Likely in the next 48 hours, pending placement  Code Status: Level 3 - DNAR and DNI      Subjective:   Patient seen and examined at bedside  She appeared anxious this morning  Review of systems limited due to advanced dementia  Patient however did not appear to be in any obvious pain  Objective:     Vitals:   Temp (24hrs), Av 8 °F (36 6 °C), Min:97 7 °F (36 5 °C), Max:98 °F (36 7 °C)    Temp:  [97 7 °F (36 5 °C)-98 °F (36 7 °C)] 98 °F (36 7 °C)  HR:  [70-72] 72  Resp:  [17-18] 18  BP: (140-168)/(64-90) 140/64  SpO2:  [90 %-92 %] 90 %  Body mass index is 27 42 kg/m²  Input and Output Summary (last 24 hours):        Intake/Output Summary (Last 24 hours) at 2020 1601  Last data filed at 1/28/2020 0335  Gross per 24 hour   Intake    Output 600 ml   Net -600 ml       Physical Exam:     Physical Exam   Constitutional: She appears well-developed and well-nourished  She appears distressed  Intermittent jerking movements   HENT:   Head: Normocephalic and atraumatic  Healing erythematous rash with crusting observed the trigeminal V1 dermatome  Eyes: Pupils are equal, round, and reactive to light  EOM are normal    Cardiovascular: Normal rate, regular rhythm and normal heart sounds  No murmur heard  Pulmonary/Chest: Effort normal and breath sounds normal  She has no wheezes  She has no rales  Abdominal: Soft  Bowel sounds are normal  There is no tenderness  There is no guarding  Musculoskeletal: She exhibits no edema  Neurological: She is alert  She is not disoriented  Skin: Skin is warm and dry  She is not diaphoretic  Psychiatric: Her mood appears anxious  Nursing note and vitals reviewed  Additional Data:     Labs:    Results from last 7 days   Lab Units 01/26/20  0603 01/25/20  0505   WBC Thousand/uL 7 46 6 44   HEMOGLOBIN g/dL 14 1 13 0   HEMATOCRIT % 42 2 40 4   PLATELETS Thousands/uL 258 282   NEUTROS PCT %  --  58   LYMPHS PCT %  --  19   MONOS PCT %  --  17*   EOS PCT %  --  4     Results from last 7 days   Lab Units 01/28/20  0430  01/25/20  0505   POTASSIUM mmol/L 3 4*   < > 3 5   CHLORIDE mmol/L 103   < > 106   CO2 mmol/L 23   < > 26   BUN mg/dL 11   < > 21   CREATININE mg/dL 0 98   < > 1 46*   CALCIUM mg/dL 7 8*   < > 8 5   ALK PHOS U/L  --   --  90   ALT U/L  --   --  21   AST U/L  --   --  22    < > = values in this interval not displayed  Results from last 7 days   Lab Units 01/24/20  1312   INR  1 01       * I Have Reviewed All Lab Data Listed Above  * Additional Pertinent Lab Tests Reviewed: All Labs For Current Hospital Admission Reviewed    Imaging:    Imaging Reports Reviewed Today Include:  None    Imaging Personally Reviewed by Myself Includes: None     Recent Cultures (last 7 days):     Results from last 7 days   Lab Units 01/24/20  1312 01/24/20  1308   BLOOD CULTURE  No Growth After 4 Days  No Growth After 4 Days  Last 24 Hours Medication List:     Current Facility-Administered Medications:  acetaminophen 975 mg Oral Q6H PRN Patricia Amos MD    acyclovir 10 mg/kg (Ideal) Intravenous Q12H Donovan Bernal MD Last Rate: 500 mg (01/28/20 1533)   aspirin 81 mg Oral Daily Patricia Amos MD    atorvastatin 80 mg Oral Daily Patricia Amos MD    cefazolin 1,000 mg Intravenous Q12H Fatmata Barreto MD Last Rate: 1,000 mg (01/28/20 1215)   fluticasone 2 spray Nasal Daily Patricia Amos MD    galantamine 8 mg Oral BID With Meals Patricia Amos MD    heparin (porcine) 5,000 Units Subcutaneous Q8H [address] Patricia Amos MD    HYDROmorphone 0 2 mg Intravenous Q4H PRN Patricia Amos MD    lisinopril 10 mg Oral Daily Sophy Fry MD    LORazepam 1 mg Oral Q15 Min PRN Patricia Amos MD    naloxone 0 04 mg Intravenous Q1MIN PRN Patricia Amos MD    nebivolol 5 mg Oral Daily Patricia Amos MD    oxyCODONE 2 5 mg Oral Q4H PRN Patricia Amos MD    oxyCODONE 5 mg Oral Q4H PRN Patricia Amos MD    polyethylene glycol 17 g Oral Daily PRN Patricia Amos MD    sodium chloride 75 mL/hr Intravenous Continuous Donovan Bernal MD Last Rate: 75 mL/hr (01/28/20 1236)   tobramycin-dexamethasone 1 drop Left Eye Q4H While Awake Randi Bacon MD    traZODone 50 mg Oral HS Patricia Amos MD         Today, Patient Was Seen By: Raj Tapia MD    ** Please Note: This note has been constructed using a voice recognition system   **

## 2020-01-28 NOTE — ASSESSMENT & PLAN NOTE
POA, left periorbital rash with yellowish pustules and yellowish discharge from the left eye with ecchymosis of the left eye for the last 4 days  Was associated with pain 1st day however patient denies any current pain    · CT orbits/temporal bones/skull base wo contrast: Diffuse acute preseptal cellulitis with periorbital edema  · Possibly bacterial periorbital cellulitis superimposed on shingles  · Blood cultures negative at 4 days  Plan:  · Continue with IV Ancef  · Complete a total of 7 days antibiotic treatment

## 2020-01-28 NOTE — SOCIAL WORK
LOS 4, patient is not a bundle, patient is not a 30 day readmission  CM spoke with patient's son Dominguez Mccracken on the telephone and in person to discuss discharge planning  CM name and role introduced  Johnnie Mccracken states that his sister Pratima Araiza (patient's daughter) has POA  Son Dominguez Mccracken states that patient lives with him in a 2 story home with a first floor bedroom/bath set up  Son Dominguez Mccracken states that patient has a walker which she reportedly refuses to use and has had recent falls  Son reports that patient has a 24/7 private aide  Income source is social security, prescriptions are filled at [facility] in [place], [place]  Denies hx of VNA, denies hx of STR, denies D&A, denies mental health; patient does have Alzheimer's dementia  CM notified son that a post acute care recommendation was made by the care team for STR  Son is in agreement with the STR recommendation and reports a health condition of his own  Discussed Freedom of Choice with son  List of facilities given to the son  Son was made aware that the list is custom filtered for them by zip code and that [facility] post acute providers are designated  Son also requests a list of facilities closer to patient's daughter in the [facility] zip code which CM provided  Johnnie Mccracken states that his sister (patient's daughter) Pratima Araiza will be available tomorrow to look at the list and provide CM with choices  Johnnie Mccracken does not want to make the decision of facility without his sister Lisa's input  CM reviewed discharge planning process including the following: identifying caregivers at home, preference for d/c planning needs, [facility] Meds to Bed program, availability of treatment team to discuss questions or concerns patient and/or family may have regarding diagnosis, plan of care, old or new medications and discharge planning   CM will continue to follow for care coordination and update assessment as necessary

## 2020-01-28 NOTE — ASSESSMENT & PLAN NOTE
Spoke with patient's daughter and son, they reported that patient has been having disorientation, jerky arms movement/tremors and slurred/incoherent speech for the last 6 months    · Continue home medication (Yonatan Lees)

## 2020-01-28 NOTE — ASSESSMENT & PLAN NOTE
Baseline creatinine around 1 1    Creatinine improving    · Avoid hypotension/nephrotoxin   · Monitor I&Os  · Monitor BMP

## 2020-01-28 NOTE — ASSESSMENT & PLAN NOTE
Blood pressure currently stable  · Will restart home lisinopril    · Continue nebivolol  · Add hydralazine p r n    · Monitor blood pressure

## 2020-01-28 NOTE — PLAN OF CARE
Problem: Prexisting or High Potential for Compromised Skin Integrity  Goal: Skin integrity is maintained or improved  Description  INTERVENTIONS:  - Identify patients at risk for skin breakdown  - Assess and monitor skin integrity  - Assess and monitor nutrition and hydration status  - Monitor labs   - Assess for incontinence   - Turn and reposition patient  - Assist with mobility/ambulation  - Relieve pressure over bony prominences  - Avoid friction and shearing  - Provide appropriate hygiene as needed including keeping skin clean and dry  - Evaluate need for skin moisturizer/barrier cream  - Collaborate with interdisciplinary team   - Patient/family teaching  - Consider wound care consult   Outcome: Progressing     Problem: Potential for Falls  Goal: Patient will remain free of falls  Description  INTERVENTIONS:  - Assess patient frequently for physical needs  -  Identify cognitive and physical deficits and behaviors that affect risk of falls    -  [place] fall precautions as indicated by assessment   - Educate patient/family on patient safety including physical limitations  - Instruct patient to call for assistance with activity based on assessment  - Modify environment to reduce risk of injury  - Consider OT/PT consult to assist with strengthening/mobility  Outcome: Progressing     Problem: SKIN/TISSUE INTEGRITY - ADULT  Goal: Skin integrity remains intact  Description  INTERVENTIONS  - Identify patients at risk for skin breakdown  - Assess and monitor skin integrity  - Assess and monitor nutrition and hydration status  - Monitor labs (i e  albumin)  - Assess for incontinence   - Turn and reposition patient  - Assist with mobility/ambulation  - Relieve pressure over bony prominences  - Avoid friction and shearing  - Provide appropriate hygiene as needed including keeping skin clean and dry  - Evaluate need for skin moisturizer/barrier cream  - Collaborate with interdisciplinary team (i e  Nutrition, Rehabilitation, etc )   - Patient/family teaching  Outcome: Progressing  Goal: Incision(s), wounds(s) or drain site(s) healing without S/S of infection  Description  INTERVENTIONS  - Assess and document risk factors for skin impairment   - Assess and document dressing, incision, wound bed, drain sites and surrounding tissue  - Consider nutrition services referral as needed  - Oral mucous membranes remain intact  - Provide patient/ family education  Outcome: Progressing     Problem: PAIN - ADULT  Goal: Verbalizes/displays adequate comfort level or baseline comfort level  Description  Interventions:  - Encourage patient to monitor pain and request assistance  - Assess pain using appropriate pain scale  - Administer analgesics based on type and severity of pain and evaluate response  - Implement non-pharmacological measures as appropriate and evaluate response  - Consider cultural and social influences on pain and pain management  - Notify physician/advanced practitioner if interventions unsuccessful or patient reports new pain  Outcome: Progressing     Problem: INFECTION - ADULT  Goal: Absence or prevention of progression during hospitalization  Description  INTERVENTIONS:  - Assess and monitor for signs and symptoms of infection  - Monitor lab/diagnostic results  - Monitor all insertion sites, i e  indwelling lines, tubes, and drains  - Monitor endotracheal if appropriate and nasal secretions for changes in amount and color  - [place] appropriate cooling/warming therapies per order  - Administer medications as ordered  - Instruct and encourage patient and family to use good hand hygiene technique  - Identify and instruct in appropriate isolation precautions for identified infection/condition  Outcome: Progressing     Problem: SAFETY ADULT  Goal: Maintain or return to baseline ADL function  Description  INTERVENTIONS:  -  Assess patient's ability to carry out ADLs; assess patient's baseline for ADL function and identify physical deficits which impact ability to perform ADLs (bathing, care of mouth/teeth, toileting, grooming, dressing, etc )  - Assess/evaluate cause of self-care deficits   - Assess range of motion  - Assess patient's mobility; develop plan if impaired  - Assess patient's need for assistive devices and provide as appropriate  - Encourage maximum independence but intervene and supervise when necessary  - Involve family in performance of ADLs  - Assess for home care needs following discharge   - Consider OT consult to assist with ADL evaluation and planning for discharge  - Provide patient education as appropriate  Outcome: Progressing  Goal: Maintain or return mobility status to optimal level  Description  INTERVENTIONS:  - Assess patient's baseline mobility status (ambulation, transfers, stairs, etc )    - Identify cognitive and physical deficits and behaviors that affect mobility  - Identify mobility aids required to assist with transfers and/or ambulation (gait belt, sit-to-stand, lift, walker, cane, etc )  - [place] fall precautions as indicated by assessment  - Record patient progress and toleration of activity level on Mobility SBAR; progress patient to next Phase/Stage  - Instruct patient to call for assistance with activity based on assessment  - Consider rehabilitation consult to assist with strengthening/weightbearing, etc   Outcome: Progressing     Problem: DISCHARGE PLANNING  Goal: Discharge to home or other facility with appropriate resources  Description  INTERVENTIONS:  - Identify barriers to discharge w/patient and caregiver  - Arrange for needed discharge resources and transportation as appropriate  - Identify discharge learning needs (meds, wound care, etc )  - Arrange for interpretive services to assist at discharge as needed  - Refer to Case Management Department for coordinating discharge planning if the patient needs post-hospital services based on physician/advanced practitioner order or complex needs related to functional status, cognitive ability, or social support system  Outcome: Progressing     Problem: Knowledge Deficit  Goal: Patient/family/caregiver demonstrates understanding of disease process, treatment plan, medications, and discharge instructions  Description  Complete learning assessment and assess knowledge base    Interventions:  - Provide teaching at level of understanding  - Provide teaching via preferred learning methods  Outcome: Progressing

## 2020-01-28 NOTE — ASSESSMENT & PLAN NOTE
Workup:   Present on admission (POA) ; admission creatinine: 1 46 (baseline around 1 1),    Improved     · Resolved with IVF hydration  · Continue with IVF as patient is not tolerating meals well   NS at 75 ml/hr   Monitor BMP daily and observe for downward trend of creatinine   Avoid hypoperfusion of the kidneys, minimize nephrotoxins

## 2020-01-28 NOTE — ASSESSMENT & PLAN NOTE
Herpes Zoster ophthalmicus    · Continue IV acyclovir  · Ophthalmology evaluation noted  · Airborne and contact isolation  · Complete a total 7 days of antiviral treatment

## 2020-01-29 LAB
ANION GAP SERPL CALCULATED.3IONS-SCNC: 10 MMOL/L (ref 4–13)
BACTERIA BLD CULT: NORMAL
BACTERIA BLD CULT: NORMAL
BUN SERPL-MCNC: 11 MG/DL (ref 5–25)
CALCIUM SERPL-MCNC: 8.7 MG/DL (ref 8.3–10.1)
CHLORIDE SERPL-SCNC: 103 MMOL/L (ref 100–108)
CO2 SERPL-SCNC: 24 MMOL/L (ref 21–32)
CREAT SERPL-MCNC: 1.05 MG/DL (ref 0.6–1.3)
GFR SERPL CREATININE-BSD FRML MDRD: 49 ML/MIN/1.73SQ M
GLUCOSE SERPL-MCNC: 127 MG/DL (ref 65–140)
POTASSIUM SERPL-SCNC: 3.1 MMOL/L (ref 3.5–5.3)
SODIUM SERPL-SCNC: 137 MMOL/L (ref 136–145)

## 2020-01-29 PROCEDURE — 92526 ORAL FUNCTION THERAPY: CPT

## 2020-01-29 PROCEDURE — 99232 SBSQ HOSP IP/OBS MODERATE 35: CPT | Performed by: INTERNAL MEDICINE

## 2020-01-29 PROCEDURE — 80048 BASIC METABOLIC PNL TOTAL CA: CPT | Performed by: INTERNAL MEDICINE

## 2020-01-29 RX ORDER — POTASSIUM CHLORIDE 20 MEQ/1
40 TABLET, EXTENDED RELEASE ORAL ONCE
Status: COMPLETED | OUTPATIENT
Start: 2020-01-29 | End: 2020-01-29

## 2020-01-29 RX ORDER — VALACYCLOVIR HYDROCHLORIDE 500 MG/1
1000 TABLET, FILM COATED ORAL EVERY 12 HOURS SCHEDULED
Status: DISCONTINUED | OUTPATIENT
Start: 2020-01-29 | End: 2020-01-30

## 2020-01-29 RX ORDER — CEPHALEXIN 500 MG/1
500 CAPSULE ORAL EVERY 8 HOURS SCHEDULED
Status: DISCONTINUED | OUTPATIENT
Start: 2020-01-29 | End: 2020-01-30

## 2020-01-29 RX ADMIN — ASPIRIN 81 MG 81 MG: 81 TABLET ORAL at 09:16

## 2020-01-29 RX ADMIN — TOBRAMYCIN AND DEXAMETHASONE 1 DROP: 3; 1 SUSPENSION/ DROPS OPHTHALMIC at 21:50

## 2020-01-29 RX ADMIN — HEPARIN SODIUM 5000 UNITS: 5000 INJECTION INTRAVENOUS; SUBCUTANEOUS at 21:47

## 2020-01-29 RX ADMIN — HEPARIN SODIUM 5000 UNITS: 5000 INJECTION INTRAVENOUS; SUBCUTANEOUS at 06:38

## 2020-01-29 RX ADMIN — Medication 500 MG: at 02:32

## 2020-01-29 RX ADMIN — TOBRAMYCIN AND DEXAMETHASONE 1 DROP: 3; 1 SUSPENSION/ DROPS OPHTHALMIC at 13:51

## 2020-01-29 RX ADMIN — TOBRAMYCIN AND DEXAMETHASONE 1 DROP: 3; 1 SUSPENSION/ DROPS OPHTHALMIC at 17:36

## 2020-01-29 RX ADMIN — VALACYCLOVIR HYDROCHLORIDE 1000 MG: 500 TABLET, FILM COATED ORAL at 13:50

## 2020-01-29 RX ADMIN — TRAZODONE HYDROCHLORIDE 50 MG: 50 TABLET ORAL at 21:37

## 2020-01-29 RX ADMIN — LISINOPRIL 10 MG: 10 TABLET ORAL at 09:16

## 2020-01-29 RX ADMIN — TOBRAMYCIN AND DEXAMETHASONE 1 DROP: 3; 1 SUSPENSION/ DROPS OPHTHALMIC at 09:17

## 2020-01-29 RX ADMIN — CEPHALEXIN 500 MG: 500 CAPSULE ORAL at 13:50

## 2020-01-29 RX ADMIN — HEPARIN SODIUM 5000 UNITS: 5000 INJECTION INTRAVENOUS; SUBCUTANEOUS at 13:49

## 2020-01-29 RX ADMIN — GALANTAMINE 8 MG: 8 TABLET, FILM COATED ORAL at 09:17

## 2020-01-29 RX ADMIN — GALANTAMINE 8 MG: 8 TABLET, FILM COATED ORAL at 19:13

## 2020-01-29 RX ADMIN — NEBIVOLOL HYDROCHLORIDE 5 MG: 5 TABLET ORAL at 09:16

## 2020-01-29 RX ADMIN — ATORVASTATIN CALCIUM 80 MG: 40 TABLET, FILM COATED ORAL at 09:16

## 2020-01-29 RX ADMIN — TOBRAMYCIN AND DEXAMETHASONE 1 DROP: 3; 1 SUSPENSION/ DROPS OPHTHALMIC at 06:38

## 2020-01-29 RX ADMIN — CEPHALEXIN 500 MG: 500 CAPSULE ORAL at 21:37

## 2020-01-29 RX ADMIN — POTASSIUM CHLORIDE 40 MEQ: 1500 TABLET, EXTENDED RELEASE ORAL at 09:16

## 2020-01-29 NOTE — PROGRESS NOTES
Pt refusing to eat most of her lunch and her afternoon medications  Will try to feed her more later when she is more awake  Son is bedside

## 2020-01-29 NOTE — PROGRESS NOTES
Progress Note - Elijah Cristobal 1935, 81 y o  female MRN: [id number]    Unit/Bed#: S -01 Encounter: [id number]    Primary Care Provider: Edwin Solitario DO   Date and time admitted to hospital: 1/24/2020  5:50 PM        * Periorbital cellulitis of left eye  Assessment & Plan  POA, left periorbital rash with yellowish pustules and yellowish discharge from the left eye with ecchymosis of the left eye for the last 4 days  Was associated with pain 1st day however patient denies any current pain    · CT orbits/temporal bones/skull base wo contrast: Diffuse acute preseptal cellulitis with periorbital edema  · Possibly bacterial periorbital cellulitis superimposed on shingles  · Blood cultures negative  Plan:  · Will switch patient to Keflex  · Complete a total of 7 days antibiotic treatment  Herpes zoster conjunctivitis of left eye  Assessment & Plan  Herpes Zoster ophthalmicus    · Switch patient to Valtrex  · Ophthalmology evaluation noted  · Airborne and contact isolation  · Complete a total 7 days of antiviral treatment    Ambulatory dysfunction  Assessment & Plan  Appreciate OT and PT eval   PT recommends short-term skilled PT, 24 hour supervision/assist, as son is not unable to care for patient at this moment  Dysphagia  Assessment & Plan  · Patient noted to be tolerating meals poorly  · Appreciate eval and recommendation    · Assessment limited due to patient's poor cooperation secondary to advanced dementia  · SL   Recommend level 2 diet and thin liquids   · Meds: whole with puree/crush large pills   · Frequent Oral care: 4x/day  · Aspiration precautions    CKD (chronic kidney disease)  Assessment & Plan  Baseline creatinine around 1 1  Creatinine improving    · Avoid hypotension/nephrotoxin   · Monitor I&Os  · Monitor BMP    KEYONNA (acute kidney injury) ([facility] [address] )  Assessment & Plan  Workup:   Present on admission (POA) ; admission creatinine: 1 46 (baseline around 1 1),    Improved  · Resolved with IVF hydration  · Continue with IVF as patient is not tolerating meals well   NS at 75 ml/hr   Monitor BMP daily and observe for downward trend of creatinine   Avoid hypoperfusion of the kidneys, minimize nephrotoxins    Hyperlipidemia  Assessment & Plan  Continue Lipitor    Late onset Alzheimer's disease without behavioral disturbance ([facility] [address] )  Assessment & Plan  Spoke with patient's daughter and son, they reported that patient has been having disorientation, jerky arms movement/tremors and slurred/incoherent speech for the last 6 months    · Continue home medication (RAZADYNE)    Benign essential hypertension  Assessment & Plan  Blood pressure currently stable  · Continue nebivolol and lisinopril  · hydralazine p r n    · Monitor blood pressure          VTE Pharmacologic Prophylaxis:   Pharmacologic: Heparin  Mechanical VTE Prophylaxis in Place: Yes    Discussions with Specialists or Other Care Team Provider:  None    Education and Discussions with Family / Patient:  Daughter called and informed during rounds  Current Length of Stay: 5 day(s)    Current Patient Status: Inpatient     Discharge Plan / Estimated Discharge Date:  Likely in the next 24-48 hours, placement pending  Code Status: Level 3 - DNAR and DNI      Subjective:   Patient seen and examined at bedside  Does not appear to be in any acute distress or obvious pain  Review of systems limited by advanced dementia  Objective:     Vitals:   Temp (24hrs), Av 9 °F (36 6 °C), Min:97 7 °F (36 5 °C), Max:98 °F (36 7 °C)    Temp:  [97 7 °F (36 5 °C)-98 °F (36 7 °C)] 98 °F (36 7 °C)  HR:  [69-73] 73  Resp:  [18] 18  BP: (131-181)/(64-90) 160/90  SpO2:  [90 %-92 %] 91 %  Body mass index is 27 42 kg/m²  Input and Output Summary (last 24 hours):        Intake/Output Summary (Last 24 hours) at 2020 1106  Last data filed at 2020 0700  Gross per 24 hour   Intake 0 ml   Output 1635 ml   Net -1635 ml       Physical Exam: Physical Exam   Constitutional: She appears well-developed and well-nourished  No distress  Intermittent jerking movement  HENT:   Head: Normocephalic and atraumatic  Crusted and healing shingles rash in the trigeminal V1 area  Erythema noted around the rash  Injected conjunctiva  Eyes: Pupils are equal, round, and reactive to light  EOM are normal    Cardiovascular: Normal rate, regular rhythm and normal heart sounds  No murmur heard  Pulmonary/Chest: Effort normal and breath sounds normal  No respiratory distress  She has no wheezes  She has no rales  Abdominal: Soft  Bowel sounds are normal  She exhibits no distension  There is no tenderness  Musculoskeletal: She exhibits no edema  Neurological: She is alert  She is disoriented  Skin: Skin is warm and dry  She is not diaphoretic  Psychiatric: She has a normal mood and affect  Nursing note and vitals reviewed  Additional Data:     Labs:    Results from last 7 days   Lab Units 01/26/20  0603 01/25/20  0505   WBC Thousand/uL 7 46 6 44   HEMOGLOBIN g/dL 14 1 13 0   HEMATOCRIT % 42 2 40 4   PLATELETS Thousands/uL 258 282   NEUTROS PCT %  --  58   LYMPHS PCT %  --  19   MONOS PCT %  --  17*   EOS PCT %  --  4     Results from last 7 days   Lab Units 01/29/20  0612  01/25/20  0505   POTASSIUM mmol/L 3 1*   < > 3 5   CHLORIDE mmol/L 103   < > 106   CO2 mmol/L 24   < > 26   BUN mg/dL 11   < > 21   CREATININE mg/dL 1 05   < > 1 46*   CALCIUM mg/dL 8 7   < > 8 5   ALK PHOS U/L  --   --  90   ALT U/L  --   --  21   AST U/L  --   --  22    < > = values in this interval not displayed  Results from last 7 days   Lab Units 01/24/20  1312   INR  1 01       * I Have Reviewed All Lab Data Listed Above  * Additional Pertinent Lab Tests Reviewed:  Marnie [facility] Admission Reviewed    Imaging:    Imaging Reports Reviewed Today Include:  None  Imaging Personally Reviewed by Myself Includes:  None    Recent Cultures (last 7 days): Results from last 7 days   Lab Units 01/24/20  1312 01/24/20  1308   BLOOD CULTURE  No Growth After 4 Days  No Growth After 4 Days  Last 24 Hours Medication List:     Current Facility-Administered Medications:  acetaminophen 975 mg Oral Q6H PRN Roney Estrada MD    aspirin 81 mg Oral Daily Roney Estrada MD    atorvastatin 80 mg Oral Daily Roney Estrada MD    cephalexin 500 mg Oral [facility] Tristan Kelly MD    fluticasone 2 spray Nasal Daily Roney Estrada MD    galantamine 8 mg Oral BID With Meals Tristan Kelly MD    heparin (porcine) 5,000 Units Subcutaneous Q8H [address] Roney Estrada MD    hydrALAZINE 5 mg Intravenous Q6H PRN Darius Quigley PA-C    HYDROmorphone 0 2 mg Intravenous Q4H PRN Roney Estrada MD    lisinopril 10 mg Oral Daily Tristan Kelly MD    LORazepam 1 mg Oral Q15 Min PRN Roney Estrada MD    naloxone 0 04 mg Intravenous Q1MIN PRN Roney Estrada MD    nebivolol 5 mg Oral Daily Roney Estrada MD    oxyCODONE 2 5 mg Oral Q4H PRN Roney Estrada MD    oxyCODONE 5 mg Oral Q4H PRN Roney Estrada MD    polyethylene glycol 17 g Oral Daily PRN Roney Estrada MD    sodium chloride 75 mL/hr Intravenous Continuous Donovan Bernal MD Last Rate: 75 mL/hr (01/28/20 2240)   tobramycin-dexamethasone 1 drop Left Eye Q4H While Awake Casey Garcia MD    traZODone 50 mg Oral HS Roney Estrada MD    valACYclovir 1,000 mg Oral [facility] Tristan Kelly MD         Today, Patient Was Seen By: Zeina Hernandez MD    ** Please Note: This note has been constructed using a voice recognition system   **

## 2020-01-29 NOTE — ASSESSMENT & PLAN NOTE
Herpes Zoster ophthalmicus    · Switch patient to Valtrex  · Ophthalmology evaluation noted  · Airborne and contact isolation  · Complete a total 7 days of antiviral treatment

## 2020-01-29 NOTE — SOCIAL WORK
CM spoke with patient's daughter Kary Joseph [phone]  who is the decision maker on STR according to patient's son  Daughter Kary Joseph requests referrals to [address] [place] [address]  Daughter reports that she is looking for a facility that can accommodate the Alzheimers and is unfamiliar with SNF facilities, so if neither [facility] nor [address] [place], [place] [address]  are able to accept, she chooses a blanket referral to facilities in [place], [place] between [place] [address] and her [place] [address]  Multiple referrals placed within a 20 mile radius of [address] per daughter's choice  Daughter states that the intention is for patient to return home with patient's son following rehab  Awaiting responses from multiple SNF referrals placed

## 2020-01-29 NOTE — PLAN OF CARE
Problem: Prexisting or High Potential for Compromised Skin Integrity  Goal: Skin integrity is maintained or improved  Description  INTERVENTIONS:  - Identify patients at risk for skin breakdown  - Assess and monitor skin integrity  - Assess and monitor nutrition and hydration status  - Monitor labs   - Assess for incontinence   - Turn and reposition patient  - Assist with mobility/ambulation  - Relieve pressure over bony prominences  - Avoid friction and shearing  - Provide appropriate hygiene as needed including keeping skin clean and dry  - Evaluate need for skin moisturizer/barrier cream  - Collaborate with interdisciplinary team   - Patient/family teaching  - Consider wound care consult   Outcome: Progressing     Problem: Potential for Falls  Goal: Patient will remain free of falls  Description  INTERVENTIONS:  - Assess patient frequently for physical needs  -  Identify cognitive and physical deficits and behaviors that affect risk of falls    -  [place] fall precautions as indicated by assessment   - Educate patient/family on patient safety including physical limitations  - Instruct patient to call for assistance with activity based on assessment  - Modify environment to reduce risk of injury  - Consider OT/PT consult to assist with strengthening/mobility  Outcome: Progressing     Problem: SKIN/TISSUE INTEGRITY - ADULT  Goal: Skin integrity remains intact  Description  INTERVENTIONS  - Identify patients at risk for skin breakdown  - Assess and monitor skin integrity  - Assess and monitor nutrition and hydration status  - Monitor labs (i e  albumin)  - Assess for incontinence   - Turn and reposition patient  - Assist with mobility/ambulation  - Relieve pressure over bony prominences  - Avoid friction and shearing  - Provide appropriate hygiene as needed including keeping skin clean and dry  - Evaluate need for skin moisturizer/barrier cream  - Collaborate with interdisciplinary team (i e  Nutrition, Rehabilitation, etc )   - Patient/family teaching  Outcome: Progressing  Goal: Incision(s), wounds(s) or drain site(s) healing without S/S of infection  Description  INTERVENTIONS  - Assess and document risk factors for skin impairment   - Assess and document dressing, incision, wound bed, drain sites and surrounding tissue  - Consider nutrition services referral as needed  - Oral mucous membranes remain intact  - Provide patient/ family education  Outcome: Progressing     Problem: PAIN - ADULT  Goal: Verbalizes/displays adequate comfort level or baseline comfort level  Description  Interventions:  - Encourage patient to monitor pain and request assistance  - Assess pain using appropriate pain scale  - Administer analgesics based on type and severity of pain and evaluate response  - Implement non-pharmacological measures as appropriate and evaluate response  - Consider cultural and social influences on pain and pain management  - Notify physician/advanced practitioner if interventions unsuccessful or patient reports new pain  Outcome: Progressing     Problem: INFECTION - ADULT  Goal: Absence or prevention of progression during hospitalization  Description  INTERVENTIONS:  - Assess and monitor for signs and symptoms of infection  - Monitor lab/diagnostic results  - Monitor all insertion sites, i e  indwelling lines, tubes, and drains  - Monitor endotracheal if appropriate and nasal secretions for changes in amount and color  - [place] appropriate cooling/warming therapies per order  - Administer medications as ordered  - Instruct and encourage patient and family to use good hand hygiene technique  - Identify and instruct in appropriate isolation precautions for identified infection/condition  Outcome: Progressing     Problem: SAFETY ADULT  Goal: Maintain or return to baseline ADL function  Description  INTERVENTIONS:  -  Assess patient's ability to carry out ADLs; assess patient's baseline for ADL function and identify physical deficits which impact ability to perform ADLs (bathing, care of mouth/teeth, toileting, grooming, dressing, etc )  - Assess/evaluate cause of self-care deficits   - Assess range of motion  - Assess patient's mobility; develop plan if impaired  - Assess patient's need for assistive devices and provide as appropriate  - Encourage maximum independence but intervene and supervise when necessary  - Involve family in performance of ADLs  - Assess for home care needs following discharge   - Consider OT consult to assist with ADL evaluation and planning for discharge  - Provide patient education as appropriate  Outcome: Progressing  Goal: Maintain or return mobility status to optimal level  Description  INTERVENTIONS:  - Assess patient's baseline mobility status (ambulation, transfers, stairs, etc )    - Identify cognitive and physical deficits and behaviors that affect mobility  - Identify mobility aids required to assist with transfers and/or ambulation (gait belt, sit-to-stand, lift, walker, cane, etc )  - [place] fall precautions as indicated by assessment  - Record patient progress and toleration of activity level on Mobility SBAR; progress patient to next Phase/Stage  - Instruct patient to call for assistance with activity based on assessment  - Consider rehabilitation consult to assist with strengthening/weightbearing, etc   Outcome: Progressing     Problem: DISCHARGE PLANNING  Goal: Discharge to home or other facility with appropriate resources  Description  INTERVENTIONS:  - Identify barriers to discharge w/patient and caregiver  - Arrange for needed discharge resources and transportation as appropriate  - Identify discharge learning needs (meds, wound care, etc )  - Arrange for interpretive services to assist at discharge as needed  - Refer to Case Management Department for coordinating discharge planning if the patient needs post-hospital services based on physician/advanced practitioner order or complex needs related to functional status, cognitive ability, or social support system  Outcome: Progressing     Problem: Knowledge Deficit  Goal: Patient/family/caregiver demonstrates understanding of disease process, treatment plan, medications, and discharge instructions  Description  Complete learning assessment and assess knowledge base  Interventions:  - Provide teaching at level of understanding  - Provide teaching via preferred learning methods  Outcome: Progressing     Problem: Nutrition/Hydration-ADULT  Goal: Nutrient/Hydration intake appropriate for improving, restoring or maintaining nutritional needs  Description  Monitor and assess patient's nutrition/hydration status for malnutrition  Collaborate with interdisciplinary team and initiate plan and interventions as ordered  Monitor patient's weight and dietary intake as ordered or per policy  Utilize nutrition screening tool and intervene as necessary  Determine patient's food preferences and provide high-protein, high-caloric foods as appropriate       INTERVENTIONS:  - Monitor oral intake, urinary output, labs, and treatment plans  - Assess nutrition and hydration status and recommend course of action  - Evaluate amount of meals eaten  - Assist patient with eating if necessary   - Allow adequate time for meals  - Recommend/ encourage appropriate diets, oral nutritional supplements, and vitamin/mineral supplements  - Order, calculate, and assess calorie counts as needed  - Recommend, monitor, and adjust tube feedings and TPN/PPN based on assessed needs  - Assess need for intravenous fluids  - Provide specific nutrition/hydration education as appropriate  - Include patient/family/caregiver in decisions related to nutrition  Outcome: Progressing

## 2020-01-29 NOTE — ASSESSMENT & PLAN NOTE
Spoke with patient's daughter and son, they reported that patient has been having disorientation, jerky arms movement/tremors and slurred/incoherent speech for the last 6 months    · Continue home medication (Earnest Moras)

## 2020-01-29 NOTE — ASSESSMENT & PLAN NOTE
POA, left periorbital rash with yellowish pustules and yellowish discharge from the left eye with ecchymosis of the left eye for the last 4 days  Was associated with pain 1st day however patient denies any current pain    · CT orbits/temporal bones/skull base wo contrast: Diffuse acute preseptal cellulitis with periorbital edema  · Possibly bacterial periorbital cellulitis superimposed on shingles  · Blood cultures negative  Plan:  · Will switch patient to Keflex  · Complete a total of 7 days antibiotic treatment

## 2020-01-29 NOTE — SPEECH THERAPY NOTE
Speech Language/Pathology    Speech/Language Pathology Progress Note    Patient Name: Elijah Cristobal  CGTKS'C Date: 1/29/2020      Subjective:  Per PCA/RN, pt w/ decreased po intake  Son present and reports pt has difficulty chewing certain foods - discussed option of puree diet, however son happy w/ dysphagia level 2 and SLP educated on choosing softer food options, ordering pt's favorite foods, using nutritional supplements, and providing smaller frequent meals throughout day  Objective:  Pt seen for dysphagia follow up  Assessed tolerance of current diet at breakfast w/ 1/2 banana, 2 tsps cream of wheat, Ensure, and water by straw  Pt required verbal encouragement to accept po  Dependent for feeding  Able to bite banana  Mastication was slow but functional w/ soft solid  No significant oral residue  Swallow initiation appeared mildly delayed; laryngeal rise present to palpation  Audible swallow w/ thins  No overt s/s aspiration throughout po intake  Assessment:  Pt tolerating dysphagia level 2 w/ thin liquids w/o overt s/s aspiration, though has poor po intake  Plan/Recommendations:  -Cont current diet; meds whole in puree  -Full feed, aspiration precautions (small bites/sips, slow rate, fully upright, only feed when fully awake/alert, provide verbal cues to focus on chewing/swallowing)  -Encourage po intake/cont nutritional supplements, small/frequent meals throughout day   -No further ST f/u indicated  Will sign off  Reconsult if needed

## 2020-01-29 NOTE — ASSESSMENT & PLAN NOTE
Blood pressure currently stable      · Continue nebivolol and lisinopril  · hydralazine p r n    · Monitor blood pressure

## 2020-01-30 PROBLEM — Z71.89 GOALS OF CARE, COUNSELING/DISCUSSION: Status: ACTIVE | Noted: 2020-01-30

## 2020-01-30 LAB
ANION GAP SERPL CALCULATED.3IONS-SCNC: 10 MMOL/L (ref 4–13)
BUN SERPL-MCNC: 16 MG/DL (ref 5–25)
CALCIUM SERPL-MCNC: 8.4 MG/DL (ref 8.3–10.1)
CHLORIDE SERPL-SCNC: 106 MMOL/L (ref 100–108)
CO2 SERPL-SCNC: 26 MMOL/L (ref 21–32)
CREAT SERPL-MCNC: 1.07 MG/DL (ref 0.6–1.3)
GFR SERPL CREATININE-BSD FRML MDRD: 48 ML/MIN/1.73SQ M
GLUCOSE SERPL-MCNC: 95 MG/DL (ref 65–140)
MAGNESIUM SERPL-MCNC: 1.9 MG/DL (ref 1.6–2.6)
POTASSIUM SERPL-SCNC: 3 MMOL/L (ref 3.5–5.3)
SODIUM SERPL-SCNC: 142 MMOL/L (ref 136–145)

## 2020-01-30 PROCEDURE — 80048 BASIC METABOLIC PNL TOTAL CA: CPT | Performed by: INTERNAL MEDICINE

## 2020-01-30 PROCEDURE — 99223 1ST HOSP IP/OBS HIGH 75: CPT | Performed by: NURSE PRACTITIONER

## 2020-01-30 PROCEDURE — 99232 SBSQ HOSP IP/OBS MODERATE 35: CPT | Performed by: INTERNAL MEDICINE

## 2020-01-30 PROCEDURE — 83735 ASSAY OF MAGNESIUM: CPT | Performed by: INTERNAL MEDICINE

## 2020-01-30 RX ORDER — POTASSIUM CHLORIDE 20 MEQ/1
40 TABLET, EXTENDED RELEASE ORAL ONCE
Status: DISCONTINUED | OUTPATIENT
Start: 2020-01-30 | End: 2020-01-30

## 2020-01-30 RX ORDER — POTASSIUM CHLORIDE 14.9 MG/ML
20 INJECTION INTRAVENOUS
Status: ACTIVE | OUTPATIENT
Start: 2020-01-30 | End: 2020-01-30

## 2020-01-30 RX ORDER — POTASSIUM CHLORIDE 14.9 MG/ML
20 INJECTION INTRAVENOUS
Status: DISPENSED | OUTPATIENT
Start: 2020-01-30 | End: 2020-01-30

## 2020-01-30 RX ORDER — POTASSIUM CHLORIDE 20 MEQ/1
40 TABLET, EXTENDED RELEASE ORAL ONCE
Status: COMPLETED | OUTPATIENT
Start: 2020-01-30 | End: 2020-01-30

## 2020-01-30 RX ORDER — CEFAZOLIN SODIUM 1 G/50ML
1000 SOLUTION INTRAVENOUS EVERY 8 HOURS
Status: COMPLETED | OUTPATIENT
Start: 2020-01-30 | End: 2020-01-31

## 2020-01-30 RX ADMIN — POTASSIUM CHLORIDE 40 MEQ: 1500 TABLET, EXTENDED RELEASE ORAL at 09:28

## 2020-01-30 RX ADMIN — ASPIRIN 81 MG 81 MG: 81 TABLET ORAL at 09:28

## 2020-01-30 RX ADMIN — ACYCLOVIR SODIUM 500 MG: 50 INJECTION, SOLUTION INTRAVENOUS at 15:38

## 2020-01-30 RX ADMIN — HEPARIN SODIUM 5000 UNITS: 5000 INJECTION INTRAVENOUS; SUBCUTANEOUS at 20:40

## 2020-01-30 RX ADMIN — TOBRAMYCIN AND DEXAMETHASONE 1 DROP: 3; 1 SUSPENSION/ DROPS OPHTHALMIC at 15:39

## 2020-01-30 RX ADMIN — CEFAZOLIN SODIUM 1000 MG: 1 SOLUTION INTRAVENOUS at 14:13

## 2020-01-30 RX ADMIN — TRAZODONE HYDROCHLORIDE 50 MG: 50 TABLET ORAL at 20:41

## 2020-01-30 RX ADMIN — CEPHALEXIN 500 MG: 500 CAPSULE ORAL at 06:40

## 2020-01-30 RX ADMIN — GALANTAMINE 8 MG: 8 TABLET, FILM COATED ORAL at 09:29

## 2020-01-30 RX ADMIN — TOBRAMYCIN AND DEXAMETHASONE 1 DROP: 3; 1 SUSPENSION/ DROPS OPHTHALMIC at 09:30

## 2020-01-30 RX ADMIN — TOBRAMYCIN AND DEXAMETHASONE 1 DROP: 3; 1 SUSPENSION/ DROPS OPHTHALMIC at 18:58

## 2020-01-30 RX ADMIN — ATORVASTATIN CALCIUM 80 MG: 40 TABLET, FILM COATED ORAL at 09:28

## 2020-01-30 RX ADMIN — HEPARIN SODIUM 5000 UNITS: 5000 INJECTION INTRAVENOUS; SUBCUTANEOUS at 06:39

## 2020-01-30 RX ADMIN — TOBRAMYCIN AND DEXAMETHASONE 1 DROP: 3; 1 SUSPENSION/ DROPS OPHTHALMIC at 20:44

## 2020-01-30 RX ADMIN — LISINOPRIL 10 MG: 10 TABLET ORAL at 09:28

## 2020-01-30 RX ADMIN — POTASSIUM CHLORIDE 20 MEQ: 14.9 INJECTION, SOLUTION INTRAVENOUS at 18:54

## 2020-01-30 RX ADMIN — HEPARIN SODIUM 5000 UNITS: 5000 INJECTION INTRAVENOUS; SUBCUTANEOUS at 14:08

## 2020-01-30 RX ADMIN — TOBRAMYCIN AND DEXAMETHASONE 1 DROP: 3; 1 SUSPENSION/ DROPS OPHTHALMIC at 06:40

## 2020-01-30 RX ADMIN — SODIUM CHLORIDE 75 ML/HR: 0.9 INJECTION, SOLUTION INTRAVENOUS at 20:34

## 2020-01-30 RX ADMIN — VALACYCLOVIR HYDROCHLORIDE 1000 MG: 500 TABLET, FILM COATED ORAL at 09:28

## 2020-01-30 RX ADMIN — NEBIVOLOL HYDROCHLORIDE 5 MG: 5 TABLET ORAL at 09:28

## 2020-01-30 RX ADMIN — CEFAZOLIN SODIUM 1000 MG: 1 SOLUTION INTRAVENOUS at 20:34

## 2020-01-30 RX ADMIN — FLUTICASONE PROPIONATE 2 SPRAY: 50 SPRAY, METERED NASAL at 09:30

## 2020-01-30 NOTE — SOCIAL WORK
CM received the following responses in [employer] for the SNF choices provided by daughter Ban Conway:  [facility] family[facility]  CM called admissions and left a message requesting callback  [facility] is still reviewing  The [facility] facilities in [place] are still reviewing  CM will continue to follow

## 2020-01-30 NOTE — SOCIAL WORK
CM received phonecall from [facility], stating that [facility] is willing to accept patient and will have an bed tomorrow (Friday)  CM left message with daughter, awaiting callback  CM to complete NJPASSR

## 2020-01-30 NOTE — PROGRESS NOTES
Attempted to change pt IV without success  ICU team contacted to assist with new IV insertion  New IV placed via ultrasound guidance  New IV positional difficulties with running fluids  Venous access consult placed

## 2020-01-30 NOTE — PALLIATIVE CARE CONFERENCE
[facility] team met with pt, son Zander Holder), and daughter Art Villalobos) was called via Paddy's phone  Pt pleasantly confused, but appeared uncomfortable seated in chair  LSW and PCA assisted in repositioning pt during exam   Introduced palliative service to family  Paddy explained pt lives with him in [place], [place]  He is currently being treated for a significant medical issue and the family has a hired live-in aide named Brandt Montenegro to help with pt's care  Roberta Fitzgerald stated she is concerned about pt's ability to tolerate STR and having poor PO intake  Paddy shared the same concerns, however when hospice care was discussed, he did not appear to be amenable to committing to this for pt  Pt's children agreed to discuss further as a family  Jennifermarshal  stated she will be speaking with their aide, Brandt Montenegro about caring for pt at home since she has shown a decline physically since admission to hospital   Roberta Fitzgerald then stated she will be present in her mother's room tomorrow to continue discussions with medical team  [facility] will follow and continue support

## 2020-01-30 NOTE — PROGRESS NOTES
Progress Note - Karla Holden 1935, 81 y o  female MRN: [id number]    Unit/Bed#: S -01 Encounter: [id number]    Primary Care Provider: Favian Estevez DO   Date and time admitted to hospital: 1/24/2020  5:50 PM        * Periorbital cellulitis of left eye  Assessment & Plan  POA, left periorbital rash with yellowish pustules and yellowish discharge from the left eye with ecchymosis of the left eye for the last 4 days  Was associated with pain 1st day however patient denies any current pain    · CT orbits/temporal bones/skull base wo contrast: Diffuse acute preseptal cellulitis with periorbital edema  · Possibly bacterial periorbital cellulitis superimposed on shingles  · Blood cultures negative  Plan:  · Will switch patient back to IV medication  Patient taking oral medication  · Complete a total of 7 days antibiotic treatment  Herpes zoster conjunctivitis of left eye  Assessment & Plan  Herpes Zoster ophthalmicus    · Will switch patient back to IV medication, patient not taking oral medication  · Ophthalmology evaluation noted  · Airborne and contact isolation  · Complete a total 7 days of antiviral treatment    Goals of care, counseling/discussion  Assessment & Plan  · Called daughter with an update on her mother  Updated patient on patient's overall poor long-term prognosis  Patient is currently not tolerating diet well  Refuses to eat or take oral medication  Patient's daughter would prefer if patient were to be able to go home with hospice care if she were to be able to get someone to care for the mother while at home  Patient agreeable to meeting with palliative care team to look at available options for the mother  Patient to remain level 3 code for now    She would like to discuss this further with the brother and palliative care team     Ambulatory dysfunction  Assessment & Plan  Appreciate OT and PT eval   PT recommends short-term skilled PT, 24 hour supervision/assist, as son is not unable to care for patient at this moment  Dysphagia  Assessment & Plan  · Patient noted to be tolerating meals poorly  · Appreciate eval and recommendation    · Assessment limited due to patient's poor cooperation secondary to advanced dementia  · SL   Recommend level 2 diet and thin liquids   · Meds: whole with puree/crush large pills   · Frequent Oral care: 4x/day  · Aspiration precautions    CKD (chronic kidney disease)  Assessment & Plan  Baseline creatinine around 1 1  Creatinine improving    · Avoid hypotension/nephrotoxin   · Monitor I&Os  · Monitor BMP    KEYONNA (acute kidney injury) ([facility] [address] )  Assessment & Plan  Workup:   Present on admission (POA) ; admission creatinine: 1 46 (baseline around 1 1),    Improved  · Resolved with IVF hydration  · Continue with IVF as patient is not tolerating meals well   NS at 75 ml/hr   Monitor BMP daily and observe for downward trend of creatinine   Avoid hypoperfusion of the kidneys, minimize nephrotoxins    Hyperlipidemia  Assessment & Plan  Continue Lipitor    Late onset Alzheimer's disease without behavioral disturbance ([facility] [address] )  Assessment & Plan  Spoke with patient's daughter and son, they reported that patient has been having disorientation, jerky arms movement/tremors and slurred/incoherent speech for the last 6 months    · Continue home medication (RAZADYNE)    Benign essential hypertension  Assessment & Plan  Blood pressure currently stable  · Continue nebivolol and lisinopril  · hydralazine p r n    · Monitor blood pressure          VTE Pharmacologic Prophylaxis:   Pharmacologic: Heparin  Mechanical VTE Prophylaxis in Place: Yes    Discussions with Specialists or Other Care Team Provider:  Nursing    Education and Discussions with Family / Patient:  Discussed with daughter Prabhu Bowen  Called daughter with an update on her mother  Updated patient on patient's overall poor long-term prognosis  Patient is currently not tolerating diet well  Refuses to eat or take oral medication  Patient's daughter would prefer if patient were to be able to go home with hospice care if she were to be able to get someone to care for the mother while at home  Patient agreeable to meeting with palliative care team to look at available options for the mother  Patient to remain level 3 code for now  She would like to discuss this further with the brother and palliative care team     Current Length of Stay: 6 day(s)    Current Patient Status: Inpatient     Discharge Plan / Estimated Discharge Date:  Likely in the next 24-48 hours  Code Status: Level 3 - DNAR and DNI      Subjective:   Patient seen and examined at bedside  Does not appear to be in any acute distress or obvious pain  Review of system limited due to patient's advanced dementia  Patient noted to not be tolerating diet  Objective:     Vitals:   Temp (24hrs), Av 7 °F (37 1 °C), Min:98 4 °F (36 9 °C), Max:98 9 °F (37 2 °C)    Temp:  [98 4 °F (36 9 °C)-98 9 °F (37 2 °C)] 98 9 °F (37 2 °C)  HR:  [58-82] 64  Resp:  [20-21] 20  BP: (132-138)/(63-76) 132/72  SpO2:  [90 %-93 %] 93 %  Body mass index is 27 42 kg/m²  Input and Output Summary (last 24 hours): Intake/Output Summary (Last 24 hours) at 2020 1207  Last data filed at 2020 1900  Gross per 24 hour   Intake 0 ml   Output 0 ml   Net 0 ml       Physical Exam:     Physical Exam   Constitutional: She appears well-developed and well-nourished  No distress  HENT:   Head: Normocephalic and atraumatic  Crusted inhaling herpes zoster rash on the right V1 trigeminal dermatome  Eyes: Pupils are equal, round, and reactive to light  EOM are normal    Cardiovascular: Normal rate, regular rhythm and normal heart sounds  No murmur heard  Pulmonary/Chest: Effort normal and breath sounds normal  She has no wheezes  She has no rales  Abdominal: Soft  Bowel sounds are normal  She exhibits no distension  There is no tenderness  Musculoskeletal: She exhibits no edema  Neurological: She is alert  She is disoriented  Skin: Skin is warm and dry  She is not diaphoretic  Psychiatric: She has a normal mood and affect  Nursing note and vitals reviewed  Additional Data:     Labs:    Results from last 7 days   Lab Units 01/26/20  0603 01/25/20  0505   WBC Thousand/uL 7 46 6 44   HEMOGLOBIN g/dL 14 1 13 0   HEMATOCRIT % 42 2 40 4   PLATELETS Thousands/uL 258 282   NEUTROS PCT %  --  58   LYMPHS PCT %  --  19   MONOS PCT %  --  17*   EOS PCT %  --  4     Results from last 7 days   Lab Units 01/30/20  0634  01/25/20  0505   POTASSIUM mmol/L 3 0*   < > 3 5   CHLORIDE mmol/L 106   < > 106   CO2 mmol/L 26   < > 26   BUN mg/dL 16   < > 21   CREATININE mg/dL 1 07   < > 1 46*   CALCIUM mg/dL 8 4   < > 8 5   ALK PHOS U/L  --   --  90   ALT U/L  --   --  21   AST U/L  --   --  22    < > = values in this interval not displayed  Results from last 7 days   Lab Units 01/24/20  1312   INR  1 01       * I Have Reviewed All Lab Data Listed Above  * Additional Pertinent Lab Tests Reviewed: All Labs For Current Hospital Admission Reviewed    Imaging:    Imaging Reports Reviewed Today Include:  None  Imaging Personally Reviewed by Myself Includes:  None  Recent Cultures (last 7 days):     Results from last 7 days   Lab Units 01/24/20  1312 01/24/20  1308   BLOOD CULTURE  No Growth After 5 Days  No Growth After 5 Days         Last 24 Hours Medication List:     Current Facility-Administered Medications:  acetaminophen 975 mg Oral Q6H PRN Garry Tyson MD    aspirin 81 mg Oral Daily Garry Tyson MD    atorvastatin 80 mg Oral Daily Garry Tyson MD    cephalexin 500 mg Oral [facility] Amber Wong MD    fluticasone 2 spray Nasal Daily Garry Tyson MD    galantamine 8 mg Oral BID With Meals Amber Wong MD    heparin (porcine) 5,000 Units Subcutaneous Q8H [address] Garry Tyson MD    hydrALAZINE 5 mg Intravenous Q6H PRN Wilma Dorsey PA-C HYDROmorphone 0 2 mg Intravenous Q4H PRN Tonja Hill MD    lisinopril 10 mg Oral Daily Patricia Mc MD    LORazepam 1 mg Oral Q15 Min PRN Tonja Hill MD    naloxone 0 04 mg Intravenous Q1MIN PRN Tonja Hill MD    nebivolol 5 mg Oral Daily Tonja Hill MD    oxyCODONE 2 5 mg Oral Q4H PRN Tonja Hill MD    oxyCODONE 5 mg Oral Q4H PRN Tonja Hill MD    polyethylene glycol 17 g Oral Daily PRN Tonja Hill MD    potassium chloride 40 mEq Oral Once Patricia Mc MD    sodium chloride 75 mL/hr Intravenous Continuous Donovan Bernal MD Last Rate: 75 mL/hr (01/28/20 2240)   tobramycin-dexamethasone 1 drop Left Eye Q4H While Awake Herbie Hill MD    traZODone 50 mg Oral HS Tonja Hill MD    valACYclovir 1,000 mg Oral Q12H Eunice Garcia MD         Today, Patient Was Seen By: Cisco Torres MD    ** Please Note: This note has been constructed using a voice recognition system   **

## 2020-01-30 NOTE — ASSESSMENT & PLAN NOTE
· Called daughter with an update on her mother  Updated patient on patient's overall poor long-term prognosis  Patient is currently not tolerating diet well  Refuses to eat or take oral medication  Patient's daughter would prefer if patient were to be able to go home with hospice care if she were to be able to get someone to care for the mother while at home  Patient agreeable to meeting with palliative care team to look at available options for the mother  Patient to remain level 3 code for now    She would like to discuss this further with the brother and palliative care team

## 2020-01-30 NOTE — ASSESSMENT & PLAN NOTE
Spoke with patient's daughter and son, they reported that patient has been having disorientation, jerky arms movement/tremors and slurred/incoherent speech for the last 6 months    · Continue home medication (Sima Mullen)

## 2020-01-30 NOTE — SOCIAL WORK
CM spoke with daughter Asha Ragland over the telephone to notify her [facility]  Daughter reports that she is waiting for a callback from the doctor regarding the scheduling of a Care Team/Family meeting to address patient's increasing weakness, poor oral intake, and expected prognosis  Daughter states that at this time, STR /SNF may or may not be the plan  Depending on the outcome of the care team meeting, family may choose to take patient home with 24/7 care  CM will continue to follow patient's hospital course and assist through discharge

## 2020-01-30 NOTE — ASSESSMENT & PLAN NOTE
POA, left periorbital rash with yellowish pustules and yellowish discharge from the left eye with ecchymosis of the left eye for the last 4 days  Was associated with pain 1st day however patient denies any current pain    · CT orbits/temporal bones/skull base wo contrast: Diffuse acute preseptal cellulitis with periorbital edema  · Possibly bacterial periorbital cellulitis superimposed on shingles  · Blood cultures negative  Plan:  · Will switch patient back to IV medication  Patient taking oral medication  · Complete a total of 7 days antibiotic treatment

## 2020-01-30 NOTE — ASSESSMENT & PLAN NOTE
Herpes Zoster ophthalmicus    · Will switch patient back to IV medication, patient not taking oral medication    · Ophthalmology evaluation noted  · Airborne and contact isolation  · Complete a total 7 days of antiviral treatment

## 2020-01-30 NOTE — CONSULTS
Consultation - Palliative and Supportive Care   Siva Anup 81 y o  female [id number]    Assessment:  Patient Active Problem List   Diagnosis    Benign essential hypertension    Late onset Alzheimer's disease without behavioral disturbance (HCC)    Hyperlipidemia    Onychomycosis of toenail    Urinary incontinence due to cognitive impairment    Acute non-recurrent pansinusitis    Toe cyanosis    Osteoarthritis of left knee    Increased frequency of urination    Early onset Alzheimer's dementia without behavioral disturbance (HCC)    [facility]    Claudication of lower extremity ([facility]    Memory loss    Knee pain    Fall at home, initial encounter    [facility]ONNA [facility]acute kidney injury) ([facility]    Recurrent falls    Urinary frequency    Encounter for smoking cessation counseling    Falls frequently    Mild cognitive impairment with memory loss    Orbital cellulitis on left    Herpes zoster conjunctivitis of left eye    Periorbital cellulitis of left eye    CKD (chronic kidney disease)    Dysphagia    Ambulatory dysfunction    Goals of care, counseling/discussion     Goals:  Level 1 code status  · Disease focused care  Will continue discussions regarding [address] as patient's clinical presentation evolves  · Children do not agree with each other regarding an appropriate discharge plan  (son - STR, daughter - home hospice )  · Daughter Lionel Granda will reach out to current home health aide to determine if she is able to provide care  If so they will consider home with hospice, if not they will likely have to consider STR at least while other aides are hired  Plan:  Symptom management:  Pain  · Acetaminophen 975mg PO q6h PRN  · Oxycodone 2 5mg PO q4h PRN for moderate pain  · Oxycodone 5mg PO q4h PRN for severe pain  · Dilaudid 0 2mg IV q4h PRN for breakthrough pain                I have reviewed the patient's controlled substance dispensing history in the Prescription Drug Monitoring Program in compliance with the [facility] regulations before prescribing any controlled substances  Decisional apparatus:  Patient is not competent on my exam today  If competence is lost, patient's substitute decision maker would default to children by [place] Act 169  We appreciate the invitation to be involved in this patient's care  We will continue to follow  Please do not hesitate to reach our on call provider through our clinic answering service at  should you have acute symptom control concerns  IDENTIFICATION:  Inpatient consult to Palliative Care  Consult performed by: JAE Hughes  Consult ordered by: Venessa Cormier MD        Physician Requesting Consult: Brianna Talley MD  Reason for Consult / Principal Problem: GOC counseling and SM secondary to Alzheimer's disease    History of Present Illness:  Sridevi Leigh is a 81 y o  female who presents with a palliative diagnosis of Alzheimer's disease with ambulatory dysfunction, dysphagia, poor PO intake  She was brought into the ED at [facility] on [facility]2420 secondary to severe herpes zoster surrounding left eye  Patient seen today out of bed and in the chair  Her son Kendal Gurrola is present and her daughter Sindy Evans is on the phone  Patient appears to be sliding out of chair however anytime we attempt to help her he tells us she is fine and not to worry  Patient is pleasantly confused, lethargic and does not participate in conversation  Patient's son is primary caretaker and has lived with her for quite some time  He was just diagnosed with metastatic melanoma  He is interested in having patient go to short term rehab  Daughter works full-time and feels short term rehab is a very unrealistic goal for her mother  She is interested in home hospice  Patient has had a live in aide Alexi Castillo and Sindy Evans will contacting Alexi iniguez to discuss patients care and see if Alexi Castillo can still handle it   Kendal Gurrola agrees if this aide can be in place he will agree to hospice and allow his mother to come home  I did express my concerns regarding patients ability ability to participate in physical therapy at short-term rehab  She has not been eating or drinking and is unable to follow directions  Son wants her up walking the halls which does not appear to be possible at this time  He will think about this and has asked that we stop back tomorrow afternoon to discuss further  ROS  All other systems are negative    Past Medical History:   Diagnosis Date    [facility] disease [facility] 2018    Arthritis     [facility]     Encounter for screening for osteoporosis     HL (hearing loss)     Hyperlipidemia     Hypertension     Sciatica 2018     Past Surgical History:   Procedure Laterality Date    SPINE SURGERY      lumbar surgery for pinched nerve      Social History     Socioeconomic History    Marital status:       Spouse name: Not on file    Number of children: Not on file    Years of education: Not on file    Highest education level: Not on file   Occupational History    Not on file   Social Needs    Financial resource strain: Not on file    Food insecurity:     Worry: Not on file     Inability: Not on file    Transportation needs:     Medical: Not on file     Non-medical: Not on file   Tobacco Use    Smoking status: Former Smoker     Packs/day: 1 00     Years: 68 00     Pack years: 68 00     Start date: 2019     Last attempt to quit: 2019     Years since quittin 0    Smokeless tobacco: Never Used   Substance and Sexual Activity    Alcohol use: Not Currently    Drug use: Not Currently    Sexual activity: Not on file   Lifestyle    Physical activity:     Days per week: Not on file     Minutes per session: Not on file    Stress: Not on file   Relationships    Social connections:     Talks on phone: Not on file     Gets together: Not on file     Attends Hoahaoism service: Not on file     Active member of club or organization: No     Attends meetings of clubs or organizations: Never     Relationship status: Not on file    Intimate partner violence:     Fear of current or ex partner: No     Emotionally abused: No     Physically abused: No     Forced sexual activity: No   Other Topics Concern    Not on file   Social History Narrative    Not on file     Family History   Problem Relation Age of Onset    Alzheimer's disease Mother     Heart disease Father     Alzheimer's disease Family     Heart disease Family     Alzheimer's disease Sister     Stroke Sister      Medications:  all current active meds have been reviewed and current meds:   Current Facility-Administered Medications   Medication Dose Route Frequency    acetaminophen (TYLENOL) tablet 975 mg  975 mg Oral Q6H PRN    acyclovir (ZOVIRAX) 500 mg in sodium chloride 0 9 % 100 mL IVPB  10 mg/kg (Ideal) Intravenous Q12H    aspirin chewable tablet 81 mg  81 mg Oral Daily    atorvastatin (LIPITOR) tablet 80 mg  80 mg Oral Daily    ceFAZolin (ANCEF) IVPB (premix) 1,000 mg  1,000 mg Intravenous Q8H    fluticasone (FLONASE) 50 mcg/act nasal spray 2 spray  2 spray Nasal Daily    galantamine (RAZADYNE) tablet 8 mg  8 mg Oral BID With Meals    heparin (porcine) subcutaneous injection 5,000 Units  5,000 Units Subcutaneous Q8H [address]    hydrALAZINE (APRESOLINE) injection 5 mg  5 mg Intravenous Q6H PRN    HYDROmorphone (DILAUDID) injection 0 2 mg  0 2 mg Intravenous Q4H PRN    lisinopril (ZESTRIL) tablet 10 mg  10 mg Oral Daily    LORazepam (ATIVAN) tablet 1 mg  1 mg Oral Q15 Min PRN    naloxone (NARCAN) 0 04 mg/mL syringe 0 04 mg  0 04 mg Intravenous Q1MIN PRN    nebivolol (BYSTOLIC) tablet 5 mg  5 mg Oral Daily    oxyCODONE (ROXICODONE) IR tablet 2 5 mg  2 5 mg Oral Q4H PRN    oxyCODONE (ROXICODONE) IR tablet 5 mg  5 mg Oral Q4H PRN    polyethylene glycol (MIRALAX) packet 17 g  17 g Oral Daily PRN    potassium chloride 20 mEq IVPB (premix)  20 mEq Intravenous Q2H    sodium chloride 0 9 % infusion 75 mL/hr Intravenous Continuous    tobramycin-dexamethasone (TOBRADEX) 0 3-0 1 % ophthalmic suspension 1 drop  1 drop Left Eye Q4H While Awake    traZODone (DESYREL) tablet 50 mg  50 mg Oral HS       Allergies   Allergen Reactions    Other      Permanent hair dye       Objective:  /88 (BP Location: Left arm)   Pulse 68   Temp 98 6 °F (37 °C) (Oral)   Resp 20   Wt 68 kg (149 lb 14 6 oz)   SpO2 93%   BMI 27 42 kg/m²   Physical Exam:  Constitutional: Appears well-developed and well-nourished  In no acute distress  Head: Normocephalic and atraumatic  Eyes: scabbed, crusted herpes zoster rash surrounding left eye which spreads up into hairline  Neck: no visible adenopathy or masses  Respiratory: Effort normal  No stridor  No respiratory distress  Gastrointestinal: No abdominal distension  Musculoskeletal: No edema  Neurological: Alert, oriented and appropriately conversant  Skin: Dry, no diaphoresis  Psychiatric: Displays a normal mood and affect  Behavior, judgement and thought content appear normal      Lab Results:   I have personally reviewed pertinent labs  , CBC: No results found for: WBC, HGB, HCT, MCV, PLT, ADJUSTEDWBC, MCH, MCHC, RDW, MPV, NRBC, CMP:   Lab Results   Component Value Date    SODIUM 142 01/30/2020    K 3 0 (L) 01/30/2020     01/30/2020    CO2 26 01/30/2020    BUN 16 01/30/2020    CREATININE 1 07 01/30/2020    CALCIUM 8 4 01/30/2020    EGFR 48 01/30/2020       Counseling / Coordination of Care  Total floor / unit time spent today 70 minutes  Greater than 50% of total time was spent with the patient and / or family counseling and / or coordination of care  A description of the counseling / coordination of care: Introduced role of Palliative Medicine  Reviewed expected disease trajectory, STR versus hospice services  Spent time supportive listening regarding frustrations of diagnosis and treatment options available at this time      [address] & Supportive Care

## 2020-01-30 NOTE — PROGRESS NOTES
Pt refusing PO medications  Pt became aggressive when attempting to administer pills  Pt spit out applesauce with pills crushed within

## 2020-01-31 LAB
ANION GAP SERPL CALCULATED.3IONS-SCNC: 12 MMOL/L (ref 4–13)
BUN SERPL-MCNC: 12 MG/DL (ref 5–25)
CALCIUM SERPL-MCNC: 8.3 MG/DL (ref 8.3–10.1)
CHLORIDE SERPL-SCNC: 106 MMOL/L (ref 100–108)
CO2 SERPL-SCNC: 24 MMOL/L (ref 21–32)
CREAT SERPL-MCNC: 0.9 MG/DL (ref 0.6–1.3)
GFR SERPL CREATININE-BSD FRML MDRD: 59 ML/MIN/1.73SQ M
GLUCOSE SERPL-MCNC: 103 MG/DL (ref 65–140)
POTASSIUM SERPL-SCNC: 3.3 MMOL/L (ref 3.5–5.3)
SODIUM SERPL-SCNC: 142 MMOL/L (ref 136–145)

## 2020-01-31 PROCEDURE — 80048 BASIC METABOLIC PNL TOTAL CA: CPT | Performed by: INTERNAL MEDICINE

## 2020-01-31 PROCEDURE — 99232 SBSQ HOSP IP/OBS MODERATE 35: CPT | Performed by: INTERNAL MEDICINE

## 2020-01-31 PROCEDURE — 99232 SBSQ HOSP IP/OBS MODERATE 35: CPT | Performed by: NURSE PRACTITIONER

## 2020-01-31 RX ORDER — POTASSIUM CHLORIDE 14.9 MG/ML
20 INJECTION INTRAVENOUS
Status: COMPLETED | OUTPATIENT
Start: 2020-01-31 | End: 2020-01-31

## 2020-01-31 RX ADMIN — CEFAZOLIN SODIUM 1000 MG: 1 SOLUTION INTRAVENOUS at 04:17

## 2020-01-31 RX ADMIN — HEPARIN SODIUM 5000 UNITS: 5000 INJECTION INTRAVENOUS; SUBCUTANEOUS at 21:12

## 2020-01-31 RX ADMIN — HEPARIN SODIUM 5000 UNITS: 5000 INJECTION INTRAVENOUS; SUBCUTANEOUS at 13:37

## 2020-01-31 RX ADMIN — CEFAZOLIN SODIUM 1000 MG: 1 SOLUTION INTRAVENOUS at 21:12

## 2020-01-31 RX ADMIN — TOBRAMYCIN AND DEXAMETHASONE 1 DROP: 3; 1 SUSPENSION/ DROPS OPHTHALMIC at 13:23

## 2020-01-31 RX ADMIN — GALANTAMINE 8 MG: 8 TABLET, FILM COATED ORAL at 18:45

## 2020-01-31 RX ADMIN — ACYCLOVIR SODIUM 500 MG: 50 INJECTION, SOLUTION INTRAVENOUS at 17:45

## 2020-01-31 RX ADMIN — ACYCLOVIR SODIUM 500 MG: 50 INJECTION, SOLUTION INTRAVENOUS at 06:14

## 2020-01-31 RX ADMIN — HEPARIN SODIUM 5000 UNITS: 5000 INJECTION INTRAVENOUS; SUBCUTANEOUS at 06:19

## 2020-01-31 RX ADMIN — TOBRAMYCIN AND DEXAMETHASONE 1 DROP: 3; 1 SUSPENSION/ DROPS OPHTHALMIC at 18:45

## 2020-01-31 RX ADMIN — TOBRAMYCIN AND DEXAMETHASONE 1 DROP: 3; 1 SUSPENSION/ DROPS OPHTHALMIC at 10:32

## 2020-01-31 RX ADMIN — CEFAZOLIN SODIUM 1000 MG: 1 SOLUTION INTRAVENOUS at 13:22

## 2020-01-31 RX ADMIN — TOBRAMYCIN AND DEXAMETHASONE 1 DROP: 3; 1 SUSPENSION/ DROPS OPHTHALMIC at 21:20

## 2020-01-31 RX ADMIN — TOBRAMYCIN AND DEXAMETHASONE 1 DROP: 3; 1 SUSPENSION/ DROPS OPHTHALMIC at 06:19

## 2020-01-31 RX ADMIN — TRAZODONE HYDROCHLORIDE 50 MG: 50 TABLET ORAL at 21:12

## 2020-01-31 RX ADMIN — POTASSIUM CHLORIDE 20 MEQ: 14.9 INJECTION, SOLUTION INTRAVENOUS at 17:45

## 2020-01-31 RX ADMIN — POTASSIUM CHLORIDE 20 MEQ: 14.9 INJECTION, SOLUTION INTRAVENOUS at 13:23

## 2020-01-31 NOTE — PROGRESS NOTES
Progress Note - Palliative & Supportive Care  Judithann Anup  81 y o   female  S /S -01   MRN: [id number]  Encounter: [id number]     Assessment  Patient Active Problem List   Diagnosis    Benign essential hypertension    Late onset Alzheimer's disease without behavioral disturbance [facility] [address] [facility]    Hyperlipidemia    Onychomycosis of toenail    Urinary incontinence due to cognitive impairment    Acute non-recurrent pansinusitis    Toe cyanosis    Osteoarthritis of left knee    Increased frequency of urination    Early onset Alzheimer's dementia without behavioral disturbance (HCC)    Dementia ([facility]    Claudication of lower extremity ([facility] [facility]    Memory loss    Knee pain    Fall at home, initial encounter    [facility]    Recurrent falls    Urinary frequency    Encounter for smoking cessation counseling    Falls frequently    Mild cognitive impairment with memory loss    Orbital cellulitis on left    Herpes zoster conjunctivitis of left eye    Periorbital cellulitis of left eye    CKD (chronic kidney disease)    Dysphagia    Ambulatory dysfunction    Goals of care, counseling/discussion     Goals of Care  Level 3 code status    Plan  Symptom Management  · Disease focused care  Will continue discussions regarding [address] as patient's clinical presentation evolves  · Patients children has decided to proceed with plan for STR  · If patient is not successful at rehab family will consider hospice at that time  24 History  Patient seen at bedside with daughter Lionel Granda present  Chart reviewed before visit  Out of bed in chair  Appears comfortable  Daughter very please that patient ate lunch today  This has made her rethink hospice  Explained STR versus home hospice and currently both Lionel Granda and her brother have decided to move forward with plans for STR  SLIM and CM made aware  Review of Systems: Pertinent items are noted in HPI      Medications    Current Facility-Administered Medications:     acetaminophen (TYLENOL) tablet 975 mg, 975 mg, Oral, Q6H PRN, Tonja Hill MD, 975 mg at 01/24/20 2130    acyclovir (ZOVIRAX) 500 mg in sodium chloride 0 9 % 100 mL IVPB, 10 mg/kg (Ideal), Intravenous, Q12H, Patricia Mc MD, Last Rate: 100 mL/hr at 01/31/20 0614, 500 mg at 01/31/20 5266    aspirin chewable tablet 81 mg, 81 mg, Oral, Daily, Tonja Hill MD, 81 mg at 01/30/20 0928    atorvastatin (LIPITOR) tablet 80 mg, 80 mg, Oral, Daily, Tonja Hill MD, 80 mg at 01/30/20 9879    ceFAZolin (ANCEF) IVPB (premix) 1,000 mg, 1,000 mg, Intravenous, Q8H, Patricia Mc MD, Last Rate: 100 mL/hr at 01/31/20 0417, 1,000 mg at 01/31/20 0417    fluticasone (FLONASE) 50 mcg/act nasal spray 2 spray, 2 spray, Nasal, Daily, Tonja Hill MD, 2 spray at 01/30/20 0930    galantamine (RAZADYNE) tablet 8 mg, 8 mg, Oral, BID With Meals, Patricia Mc MD, 8 mg at 01/30/20 0929    heparin (porcine) subcutaneous injection 5,000 Units, 5,000 Units, Subcutaneous, Q8H [facility], 5,000 Units at 01/31/20 0619 **AND** [CANCELED] Platelet count, , , Once, Tonja Hill MD    hydrALAZINE (APRESOLINE) injection 5 mg, 5 mg, Intravenous, Q6H PRN, Noreen Redman PA-C, 5 mg at 01/28/20 2323    HYDROmorphone (DILAUDID) injection 0 2 mg, 0 2 mg, Intravenous, Q4H PRN, Tonja Hill MD    lisinopril (ZESTRIL) tablet 10 mg, 10 mg, Oral, Daily, Patricia Mc MD, 10 mg at 01/30/20 7369    LORazepam (ATIVAN) tablet 1 mg, 1 mg, Oral, Q15 Min PRN, oTnja Hill MD    naloxone (NARCAN) 0 04 mg/mL syringe 0 04 mg, 0 04 mg, Intravenous, Q1MIN PRN, Tonja Hill MD    nebivolol (BYSTOLIC) tablet 5 mg, 5 mg, Oral, Daily, Tonja Hill MD, 5 mg at 01/30/20 9230    oxyCODONE (ROXICODONE) IR tablet 2 5 mg, 2 5 mg, Oral, Q4H PRN, Tonja Hill MD    oxyCODONE (ROXICODONE) IR tablet 5 mg, 5 mg, Oral, Q4H PRN, Tonja Hill MD    polyethylene glycol (MIRALAX) packet 17 g, 17 g, Oral, Daily PRN, Frank Hall Dimas Howard MD    sodium chloride 0 9 % infusion, 75 mL/hr, Intravenous, Continuous, Donovan Bernal MD, Last Rate: 75 mL/hr at 01/30/20 2034, 75 mL/hr at 01/30/20 2034    tobramycin-dexamethasone (TOBRADEX) 0 3-0 1 % ophthalmic suspension 1 drop, 1 drop, Left Eye, Q4H While Awake, Lola Queen MD, 1 drop at 01/31/20 0619    traZODone (DESYREL) tablet 50 mg, 50 mg, Oral, HS, Griffin Hickman MD, 50 mg at 01/30/20 2041    Objective  /97 (BP Location: Left arm)   Pulse 70   Temp 98 5 °F (36 9 °C) (Oral)   Resp 18   Wt 68 kg (149 lb 14 6 oz)   SpO2 92%   BMI 27 42 kg/m²   Physical Exam:   Constitutional: Appears chronically ill  In no acute distress  Head: Normocephalic and atraumatic  Eyes: EOM are normal  No ocular discharge  No scleral icterus  Gazes, makes little meaningful eye contact Crusted herpes zoster rash surrounding left eye  Neck: no visible adenopathy or masses  Respiratory: Effort normal  No stridor  No respiratory distress  Gastrointestinal: No abdominal distension  Musculoskeletal: No edema  Neurological: Alert, pleasantly confused  Skin: Dry, no diaphoresis  Psychiatric: Displays a normal mood and affect  Behavior, judgement and thought content appear normal      Lab Results:   I have personally reviewed pertinent labs  ,CMP:   Lab Results   Component Value Date    SODIUM 142 01/31/2020    K 3 3 (L) 01/31/2020     01/31/2020    CO2 24 01/31/2020    BUN 12 01/31/2020    CREATININE 0 90 01/31/2020    CALCIUM 8 3 01/31/2020    EGFR 59 01/31/2020       Counseling / Coordination of Care  Total floor / unit time spent today 25 minutes     Ion Geronimow, [address]

## 2020-01-31 NOTE — PROGRESS NOTES
Progress Note - Young Walsh 1935, 81 y o  female MRN: [id number]    Unit/Bed#: S -01 Encounter: [id number]    Primary Care Provider: Dalia Frank DO   Date and time admitted to hospital: 1/24/2020  5:50 PM        * Periorbital cellulitis of left eye  Assessment & Plan  POA, left periorbital rash with yellowish pustules and yellowish discharge from the left eye with ecchymosis of the left eye for the last 4 days  Was associated with pain 1st day however patient denies any current pain    · CT orbits/temporal bones/skull base wo contrast: Diffuse acute preseptal cellulitis with periorbital edema  · Possibly bacterial periorbital cellulitis superimposed on shingles  · Blood cultures negative  Plan:  · Day 7/7 of antibiotic treatment  · Will discontinue antibiotics after last dose today  · Patient has been afebrile with normal white blood cell count  · Complete a total of 7 days antibiotic treatment    Herpes zoster conjunctivitis of left eye  Assessment & Plan  Herpes Zoster ophthalmicus    · Ophthalmology evaluation noted  · Day 7/7 of antiviral treatment  · Will discontinue antiviral after last dose today  · Rash is crusted over and appears to be healing  No scleral injection noted  · Will discontinue airborne and contact isolation    Goals of care, counseling/discussion  Assessment & Plan  · Called daughter with an update on her mother  Updated patient on patient's overall poor long-term prognosis  Patient is currently not tolerating diet well  Refuses to eat or take oral medication  Patient's daughter would prefer if patient were to be able to go home with hospice care if she were to be able to get someone to care for the mother while at home  Patient agreeable to meeting with palliative care team to look at available options for the mother  Patient to remain level 3 code for now    She would like to discuss this further with the brother and palliative care team   · Planned meeting with palliative team for today  Ambulatory dysfunction  Assessment & Plan  Appreciate OT and PT eval   PT recommends short-term skilled PT, 24 hour supervision/assist, as son is not unable to care for patient at this moment  Dysphagia  Assessment & Plan  · Patient noted to be tolerating meals poorly  · Appreciate eval and recommendation    · Assessment limited due to patient's poor cooperation secondary to advanced dementia  · SL   Recommend level 2 diet and thin liquids   · Meds: whole with puree/crush large pills   · Frequent Oral care: 4x/day  · Aspiration precautions    CKD (chronic kidney disease)  Assessment & Plan  Baseline creatinine around 1 1  · Avoid hypotension/nephrotoxin   · Monitor I&Os  · Monitor BMP    KEYONNA (acute kidney injury) ([facility] [address] )  Assessment & Plan  Workup:   Present on admission (POA) ; admission creatinine: 1 46 (baseline around 1 1),   · Resolved with IVF hydration  · Creatinine currently at 0 90  · Continue with IVF as patient is not tolerating meals well   NS at 75 ml/hr   Monitor BMP daily and observe for downward trend of creatinine   Avoid hypoperfusion of the kidneys, minimize nephrotoxins    Hyperlipidemia  Assessment & Plan  Continue Lipitor    Late onset Alzheimer's disease without behavioral disturbance ([facility] [address] )  Assessment & Plan  Spoke with patient's daughter and son, they reported that patient has been having disorientation, jerky arms movement/tremors and slurred/incoherent speech for the last 6 months    · Continue home medication (RAZADYNE)    Benign essential hypertension  Assessment & Plan  Blood pressure currently stable      · Continue nebivolol and lisinopril  · hydralazine p r n    · Monitor blood pressure          VTE Pharmacologic Prophylaxis:   Pharmacologic: Heparin  Mechanical VTE Prophylaxis in Place: Yes    Discussions with Specialists or Other Care Team Provider:  Palliative team     Education and Discussions with Family / Patient:  Planned family meeting today  Plan to be in attendance  Current Length of Stay: 7 day(s)    Current Patient Status: Inpatient     Discharge Plan / Estimated Discharge Date:  Likely in the next 24 hours  Code Status: Level 3 - DNAR and DNI      Subjective:   Patient seen and examined at bedside  She does not appear to be in any obvious pain or acute distress  Review of system limited due to advanced dementia  Continues to have poor food intake  Objective:     Vitals:   Temp (24hrs), Av 2 °F (36 8 °C), Min:97 5 °F (36 4 °C), Max:98 6 °F (37 °C)    Temp:  [97 5 °F (36 4 °C)-98 6 °F (37 °C)] 97 5 °F (36 4 °C)  HR:  [] 112  Resp:  [18-20] 18  BP: (125-161)/(82-97) 125/82  SpO2:  [91 %-93 %] 91 %  Body mass index is 27 42 kg/m²  Input and Output Summary (last 24 hours):     No intake or output data in the 24 hours ending 20 1206    Physical Exam:     Physical Exam   Constitutional: She appears well-developed and well-nourished  No distress  HENT:   Head: Normocephalic and atraumatic  Crusted herpes rash  Appears to be healing  No scleral injection noted  Eyes: Pupils are equal, round, and reactive to light  EOM are normal    Cardiovascular: Normal rate, regular rhythm and normal heart sounds  No murmur heard  Pulmonary/Chest: Effort normal and breath sounds normal  She has no wheezes  She has no rales  Abdominal: Soft  Bowel sounds are normal  She exhibits no distension  There is no tenderness  Musculoskeletal: She exhibits no edema  Neurological: She is alert  She is disoriented  Skin: Skin is warm and dry  She is not diaphoretic  Psychiatric: She has a normal mood and affect  Nursing note and vitals reviewed          Additional Data:     Labs:    Results from last 7 days   Lab Units 20  0603 20  0505   WBC Thousand/uL 7 46 6 44   HEMOGLOBIN g/dL 14 1 13 0   HEMATOCRIT % 42 2 40 4   PLATELETS Thousands/uL 258 282   NEUTROS PCT %  --  58   LYMPHS PCT %  --  19   MONOS PCT %  --  17*   EOS PCT %  --  4     Results from last 7 days   Lab Units 01/31/20  0428  01/25/20  0505   POTASSIUM mmol/L 3 3*   < > 3 5   CHLORIDE mmol/L 106   < > 106   CO2 mmol/L 24   < > 26   BUN mg/dL 12   < > 21   CREATININE mg/dL 0 90   < > 1 46*   CALCIUM mg/dL 8 3   < > 8 5   ALK PHOS U/L  --   --  90   ALT U/L  --   --  21   AST U/L  --   --  22    < > = values in this interval not displayed  Results from last 7 days   Lab Units 01/24/20  1312   INR  1 01       * I Have Reviewed All Lab Data Listed Above  * Additional Pertinent Lab Tests Reviewed: All Labs For Current Hospital Admission Reviewed    Imaging:    Imaging Reports Reviewed Today Include:  None  Imaging Personally Reviewed by Myself Includes:  None  Recent Cultures (last 7 days):     Results from last 7 days   Lab Units 01/24/20  1312 01/24/20  1308   BLOOD CULTURE  No Growth After 5 Days  No Growth After 5 Days         Last 24 Hours Medication List:     Current Facility-Administered Medications:  acetaminophen 975 mg Oral Q6H PRN Jonatan Grant, MD    acyclovir 10 mg/kg (Ideal) Intravenous Q12H Racquel Blunt MD Last Rate: 500 mg (01/31/20 9810)   aspirin 81 mg Oral Daily Jonatan Grant MD    atorvastatin 80 mg Oral Daily Jonatan Grant MD    cefazolin 1,000 mg Intravenous Q8H Racquel Blunt MD Last Rate: 1,000 mg (01/31/20 0417)   fluticasone 2 spray Nasal Daily Jonatan Grant MD    galantamine 8 mg Oral BID With Meals Racquel Blunt MD    heparin (porcine) 5,000 Units Subcutaneous Q8H [facility] Jonatan Grant MD    hydrALAZINE 5 mg Intravenous Q6H PRN Constantin Oakes PA-C    HYDROmorphone 0 2 mg Intravenous Q4H PRN Jonatan Juanita, MD    lisinopril 10 mg Oral Daily Racquel Blunt MD    LORazepam 1 mg Oral Q15 Min PRN Jonatan Pod, MD    naloxone 0 04 mg Intravenous Q1MIN PRN Jonatan Pod, MD    nebivolol 5 mg Oral Daily Jonatan Pod, MD    oxyCODONE 2 5 mg Oral Q4H PRN Jonatan Pod, MD    oxyCODONE 5 mg Oral Q4H PRN Jonatan Pod, MD    polyethylene glycol 17 g Oral Daily PRN Luigi Sprague MD    sodium chloride 75 mL/hr Intravenous Continuous Donovan Bernal MD Last Rate: 75 mL/hr (01/30/20 2034)   tobramycin-dexamethasone 1 drop Left Eye Q4H While Awake Xavi Warren MD    traZODone 50 mg Oral HS Luigi Sprague MD         Today, Patient Was Seen By: Katie Schaeffer MD    ** Please Note: This note has been constructed using a voice recognition system   **

## 2020-01-31 NOTE — ASSESSMENT & PLAN NOTE
Baseline creatinine around 1 1       · Avoid hypotension/nephrotoxin   · Monitor I&Os  · Monitor BMP

## 2020-01-31 NOTE — ASSESSMENT & PLAN NOTE
Workup:   Present on admission (POA) ; admission creatinine: 1 46 (baseline around 1 1),   · Resolved with IVF hydration  · Creatinine currently at 0 90  · Continue with IVF as patient is not tolerating meals well   NS at 75 ml/hr   Monitor BMP daily and observe for downward trend of creatinine   Avoid hypoperfusion of the kidneys, minimize nephrotoxins

## 2020-01-31 NOTE — ASSESSMENT & PLAN NOTE
POA, left periorbital rash with yellowish pustules and yellowish discharge from the left eye with ecchymosis of the left eye for the last 4 days  Was associated with pain 1st day however patient denies any current pain    · CT orbits/temporal bones/skull base wo contrast: Diffuse acute preseptal cellulitis with periorbital edema  · Possibly bacterial periorbital cellulitis superimposed on shingles  · Blood cultures negative  Plan:  · Day 7/7 of antibiotic treatment  · Will discontinue antibiotics after last dose today  · Patient has been afebrile with normal white blood cell count    · Complete a total of 7 days antibiotic treatment

## 2020-01-31 NOTE — ASSESSMENT & PLAN NOTE
Herpes Zoster ophthalmicus    · Ophthalmology evaluation noted  · Day 7/7 of antiviral treatment  · Will discontinue antiviral after last dose today  · Rash is crusted over and appears to be healing  No scleral injection noted    · Will discontinue airborne and contact isolation

## 2020-01-31 NOTE — ASSESSMENT & PLAN NOTE
· Called daughter with an update on her mother  Updated patient on patient's overall poor long-term prognosis  Patient is currently not tolerating diet well  Refuses to eat or take oral medication  Patient's daughter would prefer if patient were to be able to go home with hospice care if she were to be able to get someone to care for the mother while at home  Patient agreeable to meeting with palliative care team to look at available options for the mother  Patient to remain level 3 code for now  She would like to discuss this further with the brother and palliative care team   · Planned meeting with palliative team for today

## 2020-01-31 NOTE — SOCIAL WORK
CM was notified by [facility] and Palliative that after family meeting, the discharge plan is STR at [facility]  CM reached out again to family's first choice which [facility]  Mariaelena Murray in admissions at [facility] requests updated PT/OT notes before willing to accept  CM notified PT and HUMZA; PT will place patient on the schedule for tomorrow morning  CM spoke with daughter Armida Brock in person who still reports that first choice for STR is [facility]  Daughter reports that if patient goes home instead of rehab, she is in discussions with the patient's prior home health aide  CM to follow up with [facility] and [facility] following updated PT jeanette tomorrow  [place] PASSR completed and uploaded in [facility]

## 2020-01-31 NOTE — ASSESSMENT & PLAN NOTE
Spoke with patient's daughter and son, they reported that patient has been having disorientation, jerky arms movement/tremors and slurred/incoherent speech for the last 6 months    · Continue home medication (Payal Zuñiga)

## 2020-02-01 LAB
ANION GAP SERPL CALCULATED.3IONS-SCNC: 13 MMOL/L (ref 4–13)
BUN SERPL-MCNC: 14 MG/DL (ref 5–25)
CALCIUM SERPL-MCNC: 8.8 MG/DL (ref 8.3–10.1)
CHLORIDE SERPL-SCNC: 106 MMOL/L (ref 100–108)
CO2 SERPL-SCNC: 23 MMOL/L (ref 21–32)
CREAT SERPL-MCNC: 0.96 MG/DL (ref 0.6–1.3)
GFR SERPL CREATININE-BSD FRML MDRD: 55 ML/MIN/1.73SQ M
GLUCOSE SERPL-MCNC: 113 MG/DL (ref 65–140)
POTASSIUM SERPL-SCNC: 3 MMOL/L (ref 3.5–5.3)
SODIUM SERPL-SCNC: 142 MMOL/L (ref 136–145)

## 2020-02-01 PROCEDURE — 99232 SBSQ HOSP IP/OBS MODERATE 35: CPT | Performed by: INTERNAL MEDICINE

## 2020-02-01 PROCEDURE — 80048 BASIC METABOLIC PNL TOTAL CA: CPT | Performed by: INTERNAL MEDICINE

## 2020-02-01 PROCEDURE — 97110 THERAPEUTIC EXERCISES: CPT

## 2020-02-01 PROCEDURE — 97530 THERAPEUTIC ACTIVITIES: CPT

## 2020-02-01 RX ORDER — POTASSIUM CHLORIDE, DEXTROSE MONOHYDRATE AND SODIUM CHLORIDE 300; 5; 900 MG/100ML; G/100ML; MG/100ML
75 INJECTION, SOLUTION INTRAVENOUS CONTINUOUS
Status: DISCONTINUED | OUTPATIENT
Start: 2020-02-01 | End: 2020-02-03

## 2020-02-01 RX ADMIN — HEPARIN SODIUM 5000 UNITS: 5000 INJECTION INTRAVENOUS; SUBCUTANEOUS at 21:44

## 2020-02-01 RX ADMIN — FLUTICASONE PROPIONATE 2 SPRAY: 50 SPRAY, METERED NASAL at 08:09

## 2020-02-01 RX ADMIN — TOBRAMYCIN AND DEXAMETHASONE 1 DROP: 3; 1 SUSPENSION/ DROPS OPHTHALMIC at 05:50

## 2020-02-01 RX ADMIN — POTASSIUM CHLORIDE, DEXTROSE MONOHYDRATE AND SODIUM CHLORIDE 75 ML/HR: 300; 5; 900 INJECTION, SOLUTION INTRAVENOUS at 23:16

## 2020-02-01 RX ADMIN — TOBRAMYCIN AND DEXAMETHASONE 1 DROP: 3; 1 SUSPENSION/ DROPS OPHTHALMIC at 18:15

## 2020-02-01 RX ADMIN — GALANTAMINE 8 MG: 8 TABLET, FILM COATED ORAL at 08:08

## 2020-02-01 RX ADMIN — ACETAMINOPHEN 975 MG: 325 TABLET, FILM COATED ORAL at 14:13

## 2020-02-01 RX ADMIN — LISINOPRIL 10 MG: 10 TABLET ORAL at 08:08

## 2020-02-01 RX ADMIN — NEBIVOLOL HYDROCHLORIDE 5 MG: 5 TABLET ORAL at 08:08

## 2020-02-01 RX ADMIN — HEPARIN SODIUM 5000 UNITS: 5000 INJECTION INTRAVENOUS; SUBCUTANEOUS at 05:49

## 2020-02-01 RX ADMIN — ASPIRIN 81 MG 81 MG: 81 TABLET ORAL at 08:08

## 2020-02-01 RX ADMIN — TOBRAMYCIN AND DEXAMETHASONE 1 DROP: 3; 1 SUSPENSION/ DROPS OPHTHALMIC at 23:00

## 2020-02-01 RX ADMIN — TOBRAMYCIN AND DEXAMETHASONE 1 DROP: 3; 1 SUSPENSION/ DROPS OPHTHALMIC at 10:00

## 2020-02-01 RX ADMIN — ATORVASTATIN CALCIUM 80 MG: 40 TABLET, FILM COATED ORAL at 08:08

## 2020-02-01 RX ADMIN — SODIUM CHLORIDE 75 ML/HR: 0.9 INJECTION, SOLUTION INTRAVENOUS at 05:48

## 2020-02-01 RX ADMIN — HEPARIN SODIUM 5000 UNITS: 5000 INJECTION INTRAVENOUS; SUBCUTANEOUS at 14:22

## 2020-02-01 RX ADMIN — TOBRAMYCIN AND DEXAMETHASONE 1 DROP: 3; 1 SUSPENSION/ DROPS OPHTHALMIC at 14:13

## 2020-02-01 RX ADMIN — POTASSIUM CHLORIDE, DEXTROSE MONOHYDRATE AND SODIUM CHLORIDE 75 ML/HR: 300; 5; 900 INJECTION, SOLUTION INTRAVENOUS at 10:44

## 2020-02-01 NOTE — PHYSICAL THERAPY NOTE
Physical Therapy Progress Note     02/01/20 0840   Pain Assessment   Pain Assessment No/denies pain   Pain Score No Pain   Restrictions/Precautions   Weight Bearing Precautions Per Order No   Other Precautions Contact/isolation; Airborne/isolation;Multiple lines; Fall Risk;Bed Alarm   General   Response to Previous Treatment Not applicable   Family/Caregiver Present No   Cognition   Overall Cognitive Status Impaired   Arousal/Participation Cooperative   Attention Difficulty attending to directions   Orientation Level Oriented to person   Memory Decreased short term memory;Decreased recall of recent events;Decreased recall of precautions   Following Commands Follows one step commands inconsistently   Comments Pt was identified by name adn ID [place], agreeable to treatment  Bed Mobility   Rolling R 1  Dependent   Additional items Assist x 2; Increased time required;Verbal cues;LE management   Rolling L 1  Dependent   Additional items Assist x 1; Increased time required;Verbal cues;LE management   Transfers   Sit to Stand Unable to assess   Ambulation/Elevation   Gait pattern Not appropriate   Activity Tolerance   Activity Tolerance Patient limited by fatigue; Other (Comment)  (limited by decreased cog )   Nurse Made Aware yes, ok to see   Exercises   Heelslides Supine;15 reps;AAROM; Bilateral   Hip Flexion Supine;10 reps;AAROM; Bilateral   Hip Adduction Supine;15 reps;AAROM; Bilateral   Knee AROM Extension Supine Supine;10 reps;AAROM; Bilateral  (SLR)   Ankle Pumps Supine;20 reps;AROM;AAROM; Bilateral   Assessment   Prognosis Fair   Problem List Decreased strength;Decreased range of motion;Decreased endurance; Impaired balance;Decreased mobility; Decreased cognition;Decreased safety awareness; Impaired judgement   Assessment Pt was found supine in bed with NSG present  She was (D)x2 for all bed mobility again this session assisting with hygine  Pt also demonstrated decreased cognition which limmited session today   She was however able to (A) with some supine exercises this session with no complaint of pain throughout when asked  Pt was fatigued post session and was comfortable, supine in bed with call bell in magi  Pt had all needs met and call bell in magi  Pt would benefit from continued PT in order to promote safe and functional mobility  Barriers to Discharge Comments Barriers to Discharge home including: decreased cognition, need for 2 person skilled assist for bed mobility when usually performing bed mobility independently or with assist of 1, inability to ambulate safely when usually ambulating short distances, decreased home support (son physically unable to care for mother at this time)   Goals   Patient Goals no expressed   STG Expiration Date 02/06/20   Short Term Goal #1 Patient will: Increase bilateral LE strength 1/2 grade to facilitate independent mobility, Perform all bed mobility tasks w/ mod -> minx1 to decrease fall risk factors, Perform all transfers w/ w/ mod -> minx1 to improve independence, Tolerate 3 hr OOB to faciliate upright tolerance and Improve Barthel Index score to 25 or greater to facilitate independence, & PT to assess mobility/OOB function when appropriate   PT Treatment Day 1   Plan   Treatment/Interventions Functional transfer training;LE strengthening/ROM; Therapeutic exercise; Endurance training;Cognitive reorientation;Patient/family training;Equipment eval/education; Bed mobility;Spoke to nursing   PT Frequency 2-3x/wk   Recommendation   Recommendation Short-term skilled PT   Additional Comments At this time, pt requires mechanical lift or hovermat w/ shuttle chair for [place], PTA

## 2020-02-01 NOTE — PLAN OF CARE
Problem: PHYSICAL THERAPY ADULT  Goal: Performs mobility at highest level of function for planned discharge setting  See evaluation for individualized goals  Description  Treatment/Interventions: Functional transfer training, LE strengthening/ROM, Therapeutic exercise, Endurance training, Cognitive reorientation, Patient/family training, Equipment eval/education, Bed mobility(PT to see for gait training)          See flowsheet documentation for full assessment, interventions and recommendations  Outcome: Progressing  Note:   Prognosis: Fair  Problem List: Decreased strength, Decreased range of motion, Decreased endurance, Impaired balance, Decreased mobility, Decreased cognition, Decreased safety awareness, Impaired judgement  Assessment: Pt was found supine in bed with NSG present  She was (D)x2 for all bed mobility again this session assisting with hygine  Pt also demonstrated decreased cognition which limmited session today  She was however able to (A) with some supine exercises this session with no complaint of pain throughout when asked  Pt was fatigued post session and was comfortable, supine in bed with call bell in reach  Pt had all needs met and call bell in reach  Pt would benefit from continued PT in order to promote safe and functional mobility  Barriers to Discharge Comments: Barriers to Discharge home including: decreased cognition, need for 2 person skilled assist for bed mobility when usually performing bed mobility independently or with assist of 1, inability to ambulate safely when usually ambulating short distances, decreased home support (son physically unable to care for mother at this time)  Recommendation: Short-term skilled PT          See flowsheet documentation for full assessment

## 2020-02-01 NOTE — ASSESSMENT & PLAN NOTE
POA, left periorbital rash with yellowish pustules and yellowish discharge from the left eye with ecchymosis of the left eye for the last 4 days  Was associated with pain 1st day however patient denies any current pain    · CT orbits/temporal bones/skull base wo contrast: Diffuse acute preseptal cellulitis with periorbital edema  · Possibly bacterial periorbital cellulitis superimposed on shingles  · Blood cultures negative      Plan:  · Antibiotic therapy completed  · Shingles rash crusted over with area of oozing from scab that was picked off by patient  · No sign of active infection remain off antibiotics

## 2020-02-01 NOTE — PROGRESS NOTES
Pt resting comfortably at this time  No s/s pain or discomfort  Call bell within reach  Will monitor

## 2020-02-01 NOTE — ASSESSMENT & PLAN NOTE
Herpes Zoster ophthalmicus    · Completed course of therapy for her zoster conjunctivitis  · Patient clinically stable  · No obvious ulcerations to conjunctiva

## 2020-02-01 NOTE — PROGRESS NOTES
Progress Note - Katya Blanton 1935, 81 y o  female MRN: [id number]    Unit/Bed#: S -01 Encounter: [id number]    Primary Care Provider: Lorelei Grimaldo DO   Date and time admitted to hospital: 1/24/2020  5:50 PM        * Periorbital cellulitis of left eye  Assessment & Plan  POA, left periorbital rash with yellowish pustules and yellowish discharge from the left eye with ecchymosis of the left eye for the last 4 days  Was associated with pain 1st day however patient denies any current pain    · CT orbits/temporal bones/skull base wo contrast: Diffuse acute preseptal cellulitis with periorbital edema  · Possibly bacterial periorbital cellulitis superimposed on shingles  · Blood cultures negative  Plan:  · Antibiotic therapy completed  · Shingles rash crusted over with area of oozing from scab that was picked off by patient  · No sign of active infection remain off antibiotics    Herpes zoster conjunctivitis of left eye  Assessment & Plan  Herpes Zoster ophthalmicus    · Completed course of therapy for her zoster conjunctivitis  · Patient clinically stable  · No obvious ulcerations to conjunctiva    Late onset Alzheimer's disease without behavioral disturbance ([facility] )  Assessment & Plan  Spoke with patient's daughter and son, they reported that patient has been having disorientation, jerky arms movement/tremors and slurred/incoherent speech for the last 6 months    · Continue home medication (Santiago Aliment)  · Family meeting with palliative Care yesterday pre shaded  · Family considering short-term rehab  · Patient's long-term prognosis is poor due to her advanced and progressive Alzheimer's disease    KEYONNA (acute kidney injury) ([facility] [address] )  Assessment & Plan  Workup:   Present on admission (POA) ; admission creatinine: 1 46 (baseline around 1 1),   · Resolved with IVF hydration   Creatinine at baseline   Continue IV fluid repeat labs in a m        VTE Pharmacologic Prophylaxis:   Pharmacologic: Heparin  Mechanical VTE Prophylaxis in Place: Yes    Patient Centered Rounds: I have performed bedside rounds with nursing staff today  Education and Discussions with Family / Patient:  Case discussed with patient's son and daughter over the phone  We reviewed poor prognosis and plan of care    Time Spent for Care: 30 minutes  More than 50% of total time spent on counseling and coordination of care as described above  Current Length of Stay: 8 day(s)    Current Patient Status: Inpatient   Certification Statement: The patient will continue to require additional inpatient hospital stay due to Patient requires skilled nursing facility    Discharge Plan:  SNF when bed available    Code Status: Level 3 - DNAR and DNI      Subjective:   Patient alert no pain following commands    Objective:     Vitals:   Temp (24hrs), Av 7 °F (37 1 °C), Min:98 5 °F (36 9 °C), Max:98 8 °F (37 1 °C)    Temp:  [98 5 °F (36 9 °C)-98 8 °F (37 1 °C)] 98 5 °F (36 9 °C)  HR:  [] 91  Resp:  [18] 18  BP: (135-180)/() 180/100  SpO2:  [91 %-93 %] 91 %  Body mass index is 27 42 kg/m²  Input and Output Summary (last 24 hours):     No intake or output data in the 24 hours ending 20 0801    Physical Exam:     Physical Exam   Constitutional: No distress  HENT:   No obvious corneal ulcerations   Eyes: No scleral icterus  Cardiovascular: Normal rate, regular rhythm and normal heart sounds  Exam reveals no gallop and no friction rub  No murmur heard  Pulmonary/Chest: Effort normal and breath sounds normal  No stridor  No respiratory distress  She has no wheezes  She has no rales  Abdominal: Soft  Bowel sounds are normal  She exhibits no distension and no mass  There is no tenderness  There is no rebound and no guarding  Neurological: She is alert  Skin: She is not diaphoretic     Zoster rash to left forehead is crusted over with small area of oozing blood were patient remove scab     Additional Data: Labs:    Results from last 7 days   Lab Units 01/26/20  0603   WBC Thousand/uL 7 46   HEMOGLOBIN g/dL 14 1   HEMATOCRIT % 42 2   PLATELETS Thousands/uL 258     Results from last 7 days   Lab Units 01/31/20  0428   SODIUM mmol/L 142   POTASSIUM mmol/L 3 3*   CHLORIDE mmol/L 106   CO2 mmol/L 24   BUN mg/dL 12   CREATININE mg/dL 0 90   ANION GAP mmol/L 12   CALCIUM mg/dL 8 3   GLUCOSE RANDOM mg/dL 103                           * I Have Reviewed All Lab Data Listed Above  * Additional Pertinent Lab Tests Reviewed:  All Labs Within Last 24 Hours Reviewed    Imaging:    Imaging Reports Reviewed Today Include:   Imaging Personally Reviewed by Myself Includes:      Recent Cultures (last 7 days):           Last 24 Hours Medication List:     Current Facility-Administered Medications:  acetaminophen 975 mg Oral Q6H PRN Emerson Barney MD   aspirin 81 mg Oral Daily Emerson Barney MD   atorvastatin 80 mg Oral Daily Shelli Quinn MD   dextrose 5 % and sodium chloride 0 9 % with KCl 40 mEq/L 75 mL/hr Intravenous Continuous Lora Ortega MD   fluticasone 2 spray Nasal Daily Emerson Barney MD   galantamine 8 mg Oral BID With Meals Carlos Javier MD   heparin (porcine) 5,000 Units Subcutaneous Q8H [address] Emerson Barney MD   hydrALAZINE 5 mg Intravenous Q6H PRN Alexa Gallegos PA-C   HYDROmorphone 0 2 mg Intravenous Q4H PRN Emerson Barney MD   lisinopril 10 mg Oral Daily Carlos Javier MD   LORazepam 1 mg Oral Q15 Min PRN Emerson Barney MD   naloxone 0 04 mg Intravenous Q1MIN PRN Emerson Barney MD   nebivolol 5 mg Oral Daily Emerson Barney MD   oxyCODONE 2 5 mg Oral Q4H PRN Emerson Barney MD   oxyCODONE 5 mg Oral Q4H PRN Emerson Barney MD   polyethylene glycol 17 g Oral Daily PRN Emerson Barney MD   tobramycin-dexamethasone 1 drop Left Eye Q4H While Awake Fili Morse MD   traZODone 50 mg Oral HS Emerson Barney MD        Today, Patient Was Seen By: Lora Ortega MD    ** Please Note: Dictation voice to text software may have been used in the creation of this document   **

## 2020-02-01 NOTE — SOCIAL WORK
CM [facility] [place]  Waiting for f/u from facility re:inpatient rehab acceptance

## 2020-02-01 NOTE — ASSESSMENT & PLAN NOTE
Spoke with patient's daughter and son, they reported that patient has been having disorientation, jerky arms movement/tremors and slurred/incoherent speech for the last 6 months    · Continue home medication (Adriana Sims)  · Family meeting with palliative Care yesterday pre shaded  · Family considering short-term rehab  · Patient's long-term prognosis is poor due to her advanced and progressive Alzheimer's disease

## 2020-02-01 NOTE — ASSESSMENT & PLAN NOTE
Workup:   Present on admission (POA) ; admission creatinine: 1 46 (baseline around 1 1),   · Resolved with IVF hydration   Creatinine at baseline   Continue IV fluid repeat labs in a m

## 2020-02-02 PROBLEM — E87.6 HYPOKALEMIA: Status: ACTIVE | Noted: 2020-02-02

## 2020-02-02 LAB
ANION GAP SERPL CALCULATED.3IONS-SCNC: 10 MMOL/L (ref 4–13)
BUN SERPL-MCNC: 15 MG/DL (ref 5–25)
CALCIUM SERPL-MCNC: 8.3 MG/DL (ref 8.3–10.1)
CHLORIDE SERPL-SCNC: 111 MMOL/L (ref 100–108)
CO2 SERPL-SCNC: 26 MMOL/L (ref 21–32)
CREAT SERPL-MCNC: 0.97 MG/DL (ref 0.6–1.3)
GFR SERPL CREATININE-BSD FRML MDRD: 54 ML/MIN/1.73SQ M
GLUCOSE SERPL-MCNC: 143 MG/DL (ref 65–140)
MAGNESIUM SERPL-MCNC: 1.6 MG/DL (ref 1.6–2.6)
POTASSIUM SERPL-SCNC: 3.1 MMOL/L (ref 3.5–5.3)
SODIUM SERPL-SCNC: 147 MMOL/L (ref 136–145)

## 2020-02-02 PROCEDURE — 80048 BASIC METABOLIC PNL TOTAL CA: CPT | Performed by: INTERNAL MEDICINE

## 2020-02-02 PROCEDURE — 83735 ASSAY OF MAGNESIUM: CPT | Performed by: INTERNAL MEDICINE

## 2020-02-02 PROCEDURE — 99232 SBSQ HOSP IP/OBS MODERATE 35: CPT | Performed by: INTERNAL MEDICINE

## 2020-02-02 RX ORDER — POTASSIUM CHLORIDE 20 MEQ/1
40 TABLET, EXTENDED RELEASE ORAL ONCE
Status: COMPLETED | OUTPATIENT
Start: 2020-02-02 | End: 2020-02-02

## 2020-02-02 RX ADMIN — POTASSIUM CHLORIDE 40 MEQ: 1500 TABLET, EXTENDED RELEASE ORAL at 16:14

## 2020-02-02 RX ADMIN — TOBRAMYCIN AND DEXAMETHASONE 1 DROP: 3; 1 SUSPENSION/ DROPS OPHTHALMIC at 14:17

## 2020-02-02 RX ADMIN — ASPIRIN 81 MG 81 MG: 81 TABLET ORAL at 08:48

## 2020-02-02 RX ADMIN — TOBRAMYCIN AND DEXAMETHASONE 1 DROP: 3; 1 SUSPENSION/ DROPS OPHTHALMIC at 06:19

## 2020-02-02 RX ADMIN — HEPARIN SODIUM 5000 UNITS: 5000 INJECTION INTRAVENOUS; SUBCUTANEOUS at 22:24

## 2020-02-02 RX ADMIN — POTASSIUM CHLORIDE 40 MEQ: 1500 TABLET, EXTENDED RELEASE ORAL at 08:54

## 2020-02-02 RX ADMIN — GALANTAMINE 8 MG: 8 TABLET, FILM COATED ORAL at 08:49

## 2020-02-02 RX ADMIN — TOBRAMYCIN AND DEXAMETHASONE 1 DROP: 3; 1 SUSPENSION/ DROPS OPHTHALMIC at 09:00

## 2020-02-02 RX ADMIN — GALANTAMINE 8 MG: 8 TABLET, FILM COATED ORAL at 18:13

## 2020-02-02 RX ADMIN — ACETAMINOPHEN 975 MG: 325 TABLET, FILM COATED ORAL at 16:20

## 2020-02-02 RX ADMIN — TOBRAMYCIN AND DEXAMETHASONE 1 DROP: 3; 1 SUSPENSION/ DROPS OPHTHALMIC at 18:12

## 2020-02-02 RX ADMIN — NEBIVOLOL HYDROCHLORIDE 5 MG: 5 TABLET ORAL at 08:48

## 2020-02-02 RX ADMIN — TOBRAMYCIN AND DEXAMETHASONE 1 DROP: 3; 1 SUSPENSION/ DROPS OPHTHALMIC at 22:24

## 2020-02-02 RX ADMIN — LISINOPRIL 10 MG: 10 TABLET ORAL at 08:48

## 2020-02-02 RX ADMIN — ATORVASTATIN CALCIUM 80 MG: 40 TABLET, FILM COATED ORAL at 08:48

## 2020-02-02 RX ADMIN — HEPARIN SODIUM 5000 UNITS: 5000 INJECTION INTRAVENOUS; SUBCUTANEOUS at 14:17

## 2020-02-02 RX ADMIN — HYDRALAZINE HYDROCHLORIDE 5 MG: 20 INJECTION INTRAMUSCULAR; INTRAVENOUS at 07:12

## 2020-02-02 RX ADMIN — HEPARIN SODIUM 5000 UNITS: 5000 INJECTION INTRAVENOUS; SUBCUTANEOUS at 06:19

## 2020-02-02 RX ADMIN — FLUTICASONE PROPIONATE 2 SPRAY: 50 SPRAY, METERED NASAL at 08:49

## 2020-02-02 RX ADMIN — TRAZODONE HYDROCHLORIDE 50 MG: 50 TABLET ORAL at 22:24

## 2020-02-02 RX ADMIN — POTASSIUM CHLORIDE, DEXTROSE MONOHYDRATE AND SODIUM CHLORIDE 75 ML/HR: 300; 5; 900 INJECTION, SOLUTION INTRAVENOUS at 14:17

## 2020-02-02 NOTE — PROGRESS NOTES
Progress Note - Alex Winkler 1935, 81 y o  female MRN: [id number]    Unit/Bed#: S -01 Encounter: [id number]    Primary Care Provider: Karie Patel DO   Date and time admitted to hospital: 1/24/2020  5:50 PM        * Periorbital cellulitis of left eye  Assessment & Plan  POA, left periorbital rash with yellowish pustules and yellowish discharge from the left eye with ecchymosis of the left eye for the last 4 days  Was associated with pain 1st day however patient denies any current pain    · CT orbits/temporal bones/skull base wo contrast: Diffuse acute preseptal cellulitis with periorbital edema  · Possibly bacterial periorbital cellulitis superimposed on shingles  · Blood cultures negative  Plan:  · Antibiotic therapy completed  · Shingles rash crusted over with area of oozing from scab that was picked off by patient  · No sign of active infection remain off antibiotics    Herpes zoster conjunctivitis of left eye  Assessment & Plan  Herpes Zoster ophthalmicus    · Completed course of therapy for her zoster conjunctivitis  · Patient clinically stable  · No obvious ulcerations to conjunctiva    Hypokalemia  Assessment & Plan  Potassium noted at 3 0  Replete  Goals of care, counseling/discussion  Assessment & Plan  · Appreciate palliative care input  · Disease focused care  · Will proceed with plan for STR  · If patient is not successful at rehab family, family will consider hospice at that time  Ambulatory dysfunction  Assessment & Plan  Appreciate OT and PT eval   PT recommends short-term skilled PT, 24 hour supervision/assist, as son is not unable to care for patient at this moment  Dysphagia  Assessment & Plan  · Patient noted to be tolerating meals poorly  · Appreciate eval and recommendation    · Assessment limited due to patient's poor cooperation secondary to advanced dementia    · SL   Recommend level 2 diet and thin liquids   · Meds: whole with puree/crush large pills · Frequent Oral care: 4x/day  · Aspiration precautions    CKD (chronic kidney disease)  Assessment & Plan  Baseline creatinine around 1 1  · Avoid hypotension/nephrotoxin   · Monitor I&Os  · Monitor BMP    [facility] )  Assessment & Plan  Workup:   Present on admission (POA) ; admission creatinine: 1 46 (baseline around 1 1),   · Resolved with IVF hydration   Creatinine at baseline   Continue IV fluid repeat labs in a m  Hyperlipidemia  Assessment & Plan  Continue Lipitor    Late onset Alzheimer's disease without behavioral disturbance ([facility] )  Assessment & Plan  Spoke with patient's daughter and son, they reported that patient has been having disorientation, jerky arms movement/tremors and slurred/incoherent speech for the last 6 months    · Continue home medication (Deepali Martyr)  · Patient's long-term prognosis is poor due to her advanced and progressive Alzheimer's disease    Benign essential hypertension  Assessment & Plan  Blood pressure currently stable  · Continue nebivolol and lisinopril  · hydralazine p r n    · Monitor blood pressure      VTE Pharmacologic Prophylaxis:   Pharmacologic: Heparin  Mechanical VTE Prophylaxis in Place: Yes    Discussions with Specialists or Other Care Team Provider:  Nursing  Education and Discussions with Family / Patient:  Calls son with an update  Unable to reach him, left a message  Current Length of Stay: 9 day(s)    Current Patient Status: Inpatient     Discharge Plan / Estimated Discharge Date: To be determined, placement pending  Code Status: Level 3 - DNAR and DNI      Subjective:   Patient seen and examined at bedside  She is lying comfortably in bed in no apparent distress or obvious pain  She continues to have jerky limb movements  Review of systems limited due to patient's advanced dementia      Objective:     Vitals:   Temp (24hrs), Av 6 °F (37 °C), Min:98 5 °F (36 9 °C), Max:98 6 °F (37 °C)    Temp:  [98 5 °F (36 9 °C)-98 6 °F (37 °C)] 98 5 °F (36 9 °C)  HR:  [62-81] 81  Resp:  [18] 18  BP: (135-190)/() 190/100  SpO2:  [93 %-96 %] 96 %  Body mass index is 27 42 kg/m²  Input and Output Summary (last 24 hours): Intake/Output Summary (Last 24 hours) at 2/2/2020 0935  Last data filed at 2/2/2020 0200  Gross per 24 hour   Intake 540 ml   Output 0 ml   Net 540 ml       Physical Exam:     Physical Exam   Constitutional: She is oriented to person, place, and time  She appears well-developed  No distress  HENT:   Head: Normocephalic and atraumatic  Crusted shingle rash with areas of blood oozing from patient picking off the scab  Eyes: Pupils are equal, round, and reactive to light  Conjunctivae and EOM are normal    Cardiovascular: Normal rate, regular rhythm and normal heart sounds  No murmur heard  Pulmonary/Chest: Effort normal and breath sounds normal  She has no wheezes  She has no rales  Abdominal: Soft  Bowel sounds are normal  She exhibits no distension  There is no tenderness  Musculoskeletal: She exhibits no edema  Neurological: She is alert and oriented to person, place, and time  Skin: Skin is warm and dry  She is not diaphoretic  Psychiatric: She has a normal mood and affect  Nursing note and vitals reviewed  Additional Data:     Labs:        Results from last 7 days   Lab Units 02/02/20  0632   POTASSIUM mmol/L 3 1*   CHLORIDE mmol/L 111*   CO2 mmol/L 26   BUN mg/dL 15   CREATININE mg/dL 0 97   CALCIUM mg/dL 8 3           * I Have Reviewed All Lab Data Listed Above  * Additional Pertinent Lab Tests Reviewed: All Labs For Current Hospital Admission Reviewed    Imaging:    Imaging Reports Reviewed Today Include:  None  Imaging Personally Reviewed by Myself Includes:  None      Recent Cultures (last 7 days):           Last 24 Hours Medication List:     Current Facility-Administered Medications:  acetaminophen 975 mg Oral Q6H PRN Zahira Bellamy MD    aspirin 81 mg Oral Daily Ronni Rideau PEGGY Quinn MD    atorvastatin 80 mg Oral Daily Shelli Quinn MD    dextrose 5 % and sodium chloride 0 9 % with KCl 40 mEq/L 75 mL/hr Intravenous Continuous Damari Mark MD Last Rate: 75 mL/hr (02/01/20 8566)   fluticasone 2 spray Nasal Daily Ling Barrera MD    galantamine 8 mg Oral BID With Meals Quinton James MD    heparin (porcine) 5,000 Units Subcutaneous Q8H [address] Ling Barrera MD    hydrALAZINE 5 mg Intravenous Q6H PRN Angie Eden PA-C    HYDROmorphone 0 2 mg Intravenous Q4H PRN Ling Barrera MD    lisinopril 10 mg Oral Daily Quinton James MD    LORazepam 1 mg Oral Q15 Min PRN Ling Barrera MD    naloxone 0 04 mg Intravenous Q1MIN PRN Ling Barrera MD    nebivolol 5 mg Oral Daily Ling Barrera MD    oxyCODONE 2 5 mg Oral Q4H PRN Ling Barrera MD    oxyCODONE 5 mg Oral Q4H PRN Ling Barrera MD    polyethylene glycol 17 g Oral Daily PRN Ling Barrera MD    tobramycin-dexamethasone 1 drop Left Eye Q4H While Awake Sunny John MD    traZODone 50 mg Oral HS Ling Barrera MD         Today, Patient Was Seen By: Nani Baumann MD    ** Please Note: This note has been constructed using a voice recognition system   **

## 2020-02-02 NOTE — PLAN OF CARE
Problem: Prexisting or High Potential for Compromised Skin Integrity  Goal: Skin integrity is maintained or improved  Description  INTERVENTIONS:  - Identify patients at risk for skin breakdown  - Assess and monitor skin integrity  - Assess and monitor nutrition and hydration status  - Monitor labs   - Assess for incontinence   - Turn and reposition patient  - Assist with mobility/ambulation  - Relieve pressure over bony prominences  - Avoid friction and shearing  - Provide appropriate hygiene as needed including keeping skin clean and dry  - Evaluate need for skin moisturizer/barrier cream  - Collaborate with interdisciplinary team   - Patient/family teaching  - Consider wound care consult   Outcome: Progressing     Problem: Potential for Falls  Goal: Patient will remain free of falls  Description  INTERVENTIONS:  - Assess patient frequently for physical needs  -  Identify cognitive and physical deficits and behaviors that affect risk of falls    -  [place] fall precautions as indicated by assessment   - Educate patient/family on patient safety including physical limitations  - Instruct patient to call for assistance with activity based on assessment  - Modify environment to reduce risk of injury  - Consider OT/PT consult to assist with strengthening/mobility  Outcome: Progressing     Problem: SKIN/TISSUE INTEGRITY - ADULT  Goal: Skin integrity remains intact  Description  INTERVENTIONS  - Identify patients at risk for skin breakdown  - Assess and monitor skin integrity  - Assess and monitor nutrition and hydration status  - Monitor labs (i e  albumin)  - Assess for incontinence   - Turn and reposition patient  - Assist with mobility/ambulation  - Relieve pressure over bony prominences  - Avoid friction and shearing  - Provide appropriate hygiene as needed including keeping skin clean and dry  - Evaluate need for skin moisturizer/barrier cream  - Collaborate with interdisciplinary team (i e  Nutrition, Rehabilitation, etc )   - Patient/family teaching  Outcome: Progressing  Goal: Incision(s), wounds(s) or drain site(s) healing without S/S of infection  Description  INTERVENTIONS  - Assess and document risk factors for skin impairment   - Assess and document dressing, incision, wound bed, drain sites and surrounding tissue  - Consider nutrition services referral as needed  - Oral mucous membranes remain intact  - Provide patient/ family education  Outcome: Progressing     Problem: PAIN - ADULT  Goal: Verbalizes/displays adequate comfort level or baseline comfort level  Description  Interventions:  - Encourage patient to monitor pain and request assistance  - Assess pain using appropriate pain scale  - Administer analgesics based on type and severity of pain and evaluate response  - Implement non-pharmacological measures as appropriate and evaluate response  - Consider cultural and social influences on pain and pain management  - Notify physician/advanced practitioner if interventions unsuccessful or patient reports new pain  Outcome: Progressing     Problem: INFECTION - ADULT  Goal: Absence or prevention of progression during hospitalization  Description  INTERVENTIONS:  - Assess and monitor for signs and symptoms of infection  - Monitor lab/diagnostic results  - Monitor all insertion sites, i e  indwelling lines, tubes, and drains  - Monitor endotracheal if appropriate and nasal secretions for changes in amount and color  - [place] appropriate cooling/warming therapies per order  - Administer medications as ordered  - Instruct and encourage patient and family to use good hand hygiene technique  - Identify and instruct in appropriate isolation precautions for identified infection/condition  Outcome: Progressing     Problem: SAFETY ADULT  Goal: Maintain or return to baseline ADL function  Description  INTERVENTIONS:  -  Assess patient's ability to carry out ADLs; assess patient's baseline for ADL function and identify physical deficits which impact ability to perform ADLs (bathing, care of mouth/teeth, toileting, grooming, dressing, etc )  - Assess/evaluate cause of self-care deficits   - Assess range of motion  - Assess patient's mobility; develop plan if impaired  - Assess patient's need for assistive devices and provide as appropriate  - Encourage maximum independence but intervene and supervise when necessary  - Involve family in performance of ADLs  - Assess for home care needs following discharge   - Consider OT consult to assist with ADL evaluation and planning for discharge  - Provide patient education as appropriate  Outcome: Progressing  Goal: Maintain or return mobility status to optimal level  Description  INTERVENTIONS:  - Assess patient's baseline mobility status (ambulation, transfers, stairs, etc )    - Identify cognitive and physical deficits and behaviors that affect mobility  - Identify mobility aids required to assist with transfers and/or ambulation (gait belt, sit-to-stand, lift, walker, cane, etc )  - [place] fall precautions as indicated by assessment  - Record patient progress and toleration of activity level on Mobility SBAR; progress patient to next Phase/Stage  - Instruct patient to call for assistance with activity based on assessment  - Consider rehabilitation consult to assist with strengthening/weightbearing, etc   Outcome: Progressing     Problem: DISCHARGE PLANNING  Goal: Discharge to home or other facility with appropriate resources  Description  INTERVENTIONS:  - Identify barriers to discharge w/patient and caregiver  - Arrange for needed discharge resources and transportation as appropriate  - Identify discharge learning needs (meds, wound care, etc )  - Arrange for interpretive services to assist at discharge as needed  - Refer to Case Management Department for coordinating discharge planning if the patient needs post-hospital services based on physician/advanced practitioner order or complex needs related to functional status, cognitive ability, or social support system  Outcome: Progressing     Problem: Knowledge Deficit  Goal: Patient/family/caregiver demonstrates understanding of disease process, treatment plan, medications, and discharge instructions  Description  Complete learning assessment and assess knowledge base  Interventions:  - Provide teaching at level of understanding  - Provide teaching via preferred learning methods  Outcome: Progressing     Problem: Nutrition/Hydration-ADULT  Goal: Nutrient/Hydration intake appropriate for improving, restoring or maintaining nutritional needs  Description  Monitor and assess patient's nutrition/hydration status for malnutrition  Collaborate with interdisciplinary team and initiate plan and interventions as ordered  Monitor patient's weight and dietary intake as ordered or per policy  Utilize nutrition screening tool and intervene as necessary  Determine patient's food preferences and provide high-protein, high-caloric foods as appropriate       INTERVENTIONS:  - Monitor oral intake, urinary output, labs, and treatment plans  - Assess nutrition and hydration status and recommend course of action  - Evaluate amount of meals eaten  - Assist patient with eating if necessary   - Allow adequate time for meals  - Recommend/ encourage appropriate diets, oral nutritional supplements, and vitamin/mineral supplements  - Order, calculate, and assess calorie counts as needed  - Recommend, monitor, and adjust tube feedings and TPN/PPN based on assessed needs  - Assess need for intravenous fluids  - Provide specific nutrition/hydration education as appropriate  - Include patient/family/caregiver in decisions related to nutrition  Outcome: Progressing

## 2020-02-02 NOTE — PLAN OF CARE
Problem: Prexisting or High Potential for Compromised Skin Integrity  Goal: Skin integrity is maintained or improved  Description  INTERVENTIONS:  - Identify patients at risk for skin breakdown  - Assess and monitor skin integrity  - Assess and monitor nutrition and hydration status  - Monitor labs   - Assess for incontinence   - Turn and reposition patient  - Assist with mobility/ambulation  - Relieve pressure over bony prominences  - Avoid friction and shearing  - Provide appropriate hygiene as needed including keeping skin clean and dry  - Evaluate need for skin moisturizer/barrier cream  - Collaborate with interdisciplinary team   - Patient/family teaching  - Consider wound care consult   Outcome: Progressing     Problem: Potential for Falls  Goal: Patient will remain free of falls  Description  INTERVENTIONS:  - Assess patient frequently for physical needs  -  Identify cognitive and physical deficits and behaviors that affect risk of falls    -  [place] fall precautions as indicated by assessment   - Educate patient/family on patient safety including physical limitations  - Instruct patient to call for assistance with activity based on assessment  - Modify environment to reduce risk of injury  - Consider OT/PT consult to assist with strengthening/mobility  Outcome: Progressing     Problem: SKIN/TISSUE INTEGRITY - ADULT  Goal: Skin integrity remains intact  Description  INTERVENTIONS  - Identify patients at risk for skin breakdown  - Assess and monitor skin integrity  - Assess and monitor nutrition and hydration status  - Monitor labs (i e  albumin)  - Assess for incontinence   - Turn and reposition patient  - Assist with mobility/ambulation  - Relieve pressure over bony prominences  - Avoid friction and shearing  - Provide appropriate hygiene as needed including keeping skin clean and dry  - Evaluate need for skin moisturizer/barrier cream  - Collaborate with interdisciplinary team (i e  Nutrition, Rehabilitation, etc )   - Patient/family teaching  Outcome: Progressing  Goal: Incision(s), wounds(s) or drain site(s) healing without S/S of infection  Description  INTERVENTIONS  - Assess and document risk factors for skin impairment   - Assess and document dressing, incision, wound bed, drain sites and surrounding tissue  - Consider nutrition services referral as needed  - Oral mucous membranes remain intact  - Provide patient/ family education  Outcome: Progressing     Problem: PAIN - ADULT  Goal: Verbalizes/displays adequate comfort level or baseline comfort level  Description  Interventions:  - Encourage patient to monitor pain and request assistance  - Assess pain using appropriate pain scale  - Administer analgesics based on type and severity of pain and evaluate response  - Implement non-pharmacological measures as appropriate and evaluate response  - Consider cultural and social influences on pain and pain management  - Notify physician/advanced practitioner if interventions unsuccessful or patient reports new pain  Outcome: Progressing     Problem: INFECTION - ADULT  Goal: Absence or prevention of progression during hospitalization  Description  INTERVENTIONS:  - Assess and monitor for signs and symptoms of infection  - Monitor lab/diagnostic results  - Monitor all insertion sites, i e  indwelling lines, tubes, and drains  - Monitor endotracheal if appropriate and nasal secretions for changes in amount and color  - [place] appropriate cooling/warming therapies per order  - Administer medications as ordered  - Instruct and encourage patient and family to use good hand hygiene technique  - Identify and instruct in appropriate isolation precautions for identified infection/condition  Outcome: Progressing     Problem: SAFETY ADULT  Goal: Maintain or return to baseline ADL function  Description  INTERVENTIONS:  -  Assess patient's ability to carry out ADLs; assess patient's baseline for ADL function and identify physical deficits which impact ability to perform ADLs (bathing, care of mouth/teeth, toileting, grooming, dressing, etc )  - Assess/evaluate cause of self-care deficits   - Assess range of motion  - Assess patient's mobility; develop plan if impaired  - Assess patient's need for assistive devices and provide as appropriate  - Encourage maximum independence but intervene and supervise when necessary  - Involve family in performance of ADLs  - Assess for home care needs following discharge   - Consider OT consult to assist with ADL evaluation and planning for discharge  - Provide patient education as appropriate  Outcome: Progressing  Goal: Maintain or return mobility status to optimal level  Description  INTERVENTIONS:  - Assess patient's baseline mobility status (ambulation, transfers, stairs, etc )    - Identify cognitive and physical deficits and behaviors that affect mobility  - Identify mobility aids required to assist with transfers and/or ambulation (gait belt, sit-to-stand, lift, walker, cane, etc )  - [place] fall precautions as indicated by assessment  - Record patient progress and toleration of activity level on Mobility SBAR; progress patient to next Phase/Stage  - Instruct patient to call for assistance with activity based on assessment  - Consider rehabilitation consult to assist with strengthening/weightbearing, etc   Outcome: Progressing     Problem: DISCHARGE PLANNING  Goal: Discharge to home or other facility with appropriate resources  Description  INTERVENTIONS:  - Identify barriers to discharge w/patient and caregiver  - Arrange for needed discharge resources and transportation as appropriate  - Identify discharge learning needs (meds, wound care, etc )  - Arrange for interpretive services to assist at discharge as needed  - Refer to Case Management Department for coordinating discharge planning if the patient needs post-hospital services based on physician/advanced practitioner order or complex needs related to functional status, cognitive ability, or social support system  Outcome: Progressing     Problem: Knowledge Deficit  Goal: Patient/family/caregiver demonstrates understanding of disease process, treatment plan, medications, and discharge instructions  Description  Complete learning assessment and assess knowledge base  Interventions:  - Provide teaching at level of understanding  - Provide teaching via preferred learning methods  Outcome: Progressing     Problem: Nutrition/Hydration-ADULT  Goal: Nutrient/Hydration intake appropriate for improving, restoring or maintaining nutritional needs  Description  Monitor and assess patient's nutrition/hydration status for malnutrition  Collaborate with interdisciplinary team and initiate plan and interventions as ordered  Monitor patient's weight and dietary intake as ordered or per policy  Utilize nutrition screening tool and intervene as necessary  Determine patient's food preferences and provide high-protein, high-caloric foods as appropriate       INTERVENTIONS:  - Monitor oral intake, urinary output, labs, and treatment plans  - Assess nutrition and hydration status and recommend course of action  - Evaluate amount of meals eaten  - Assist patient with eating if necessary   - Allow adequate time for meals  - Recommend/ encourage appropriate diets, oral nutritional supplements, and vitamin/mineral supplements  - Order, calculate, and assess calorie counts as needed  - Recommend, monitor, and adjust tube feedings and TPN/PPN based on assessed needs  - Assess need for intravenous fluids  - Provide specific nutrition/hydration education as appropriate  - Include patient/family/caregiver in decisions related to nutrition  Outcome: Progressing

## 2020-02-02 NOTE — ASSESSMENT & PLAN NOTE
Spoke with patient's daughter and son, they reported that patient has been having disorientation, jerky arms movement/tremors and slurred/incoherent speech for the last 6 months    · Continue home medication ([place])  · Patient's long-term prognosis is poor due to her advanced and progressive Alzheimer's disease

## 2020-02-02 NOTE — ASSESSMENT & PLAN NOTE
· Appreciate palliative care input  · Disease focused care  · Will proceed with plan for STR  · If patient is not successful at rehab family, family will consider hospice at that time

## 2020-02-03 ENCOUNTER — TRANSITIONAL CARE MANAGEMENT (OUTPATIENT)
Dept: FAMILY MEDICINE CLINIC | Facility: CLINIC | Age: 85
End: 2020-02-03

## 2020-02-03 VITALS
RESPIRATION RATE: 19 BRPM | OXYGEN SATURATION: 95 % | HEART RATE: 70 BPM | TEMPERATURE: 98.8 F | WEIGHT: 149.91 LBS | DIASTOLIC BLOOD PRESSURE: 88 MMHG | SYSTOLIC BLOOD PRESSURE: 160 MMHG | BODY MASS INDEX: 27.42 KG/M2

## 2020-02-03 LAB
ANION GAP SERPL CALCULATED.3IONS-SCNC: 11 MMOL/L (ref 4–13)
BUN SERPL-MCNC: 15 MG/DL (ref 5–25)
CALCIUM SERPL-MCNC: 9.1 MG/DL (ref 8.3–10.1)
CHLORIDE SERPL-SCNC: 111 MMOL/L (ref 100–108)
CO2 SERPL-SCNC: 22 MMOL/L (ref 21–32)
CREAT SERPL-MCNC: 1 MG/DL (ref 0.6–1.3)
GFR SERPL CREATININE-BSD FRML MDRD: 52 ML/MIN/1.73SQ M
GLUCOSE SERPL-MCNC: 117 MG/DL (ref 65–140)
POTASSIUM SERPL-SCNC: 3.6 MMOL/L (ref 3.5–5.3)
SODIUM SERPL-SCNC: 144 MMOL/L (ref 136–145)

## 2020-02-03 PROCEDURE — 80048 BASIC METABOLIC PNL TOTAL CA: CPT | Performed by: INTERNAL MEDICINE

## 2020-02-03 PROCEDURE — 99239 HOSP IP/OBS DSCHRG MGMT >30: CPT | Performed by: INTERNAL MEDICINE

## 2020-02-03 RX ORDER — POTASSIUM CHLORIDE 20 MEQ/1
20 TABLET, EXTENDED RELEASE ORAL DAILY
Refills: 0
Start: 2020-02-03

## 2020-02-03 RX ORDER — POTASSIUM CHLORIDE 20 MEQ/1
40 TABLET, EXTENDED RELEASE ORAL ONCE
Status: DISCONTINUED | OUTPATIENT
Start: 2020-02-03 | End: 2020-02-03 | Stop reason: HOSPADM

## 2020-02-03 RX ORDER — TOBRAMYCIN AND DEXAMETHASONE 3; 1 MG/ML; MG/ML
1 SUSPENSION/ DROPS OPHTHALMIC
Qty: 0.25 ML | Refills: 0 | Status: SHIPPED | OUTPATIENT
Start: 2020-02-03 | End: 2020-02-04

## 2020-02-03 RX ORDER — ACETAMINOPHEN 650 MG/1
325 SUPPOSITORY RECTAL EVERY 4 HOURS PRN
Status: DISCONTINUED | OUTPATIENT
Start: 2020-02-03 | End: 2020-02-03 | Stop reason: HOSPADM

## 2020-02-03 RX ADMIN — NEBIVOLOL HYDROCHLORIDE 5 MG: 5 TABLET ORAL at 08:39

## 2020-02-03 RX ADMIN — ACETAMINOPHEN 325 MG: 650 SUPPOSITORY RECTAL at 13:22

## 2020-02-03 RX ADMIN — ATORVASTATIN CALCIUM 80 MG: 40 TABLET, FILM COATED ORAL at 08:39

## 2020-02-03 RX ADMIN — TOBRAMYCIN AND DEXAMETHASONE 1 DROP: 3; 1 SUSPENSION/ DROPS OPHTHALMIC at 13:32

## 2020-02-03 RX ADMIN — ASPIRIN 81 MG 81 MG: 81 TABLET ORAL at 08:39

## 2020-02-03 RX ADMIN — HEPARIN SODIUM 5000 UNITS: 5000 INJECTION INTRAVENOUS; SUBCUTANEOUS at 06:00

## 2020-02-03 RX ADMIN — TOBRAMYCIN AND DEXAMETHASONE 1 DROP: 3; 1 SUSPENSION/ DROPS OPHTHALMIC at 10:41

## 2020-02-03 RX ADMIN — FLUTICASONE PROPIONATE 2 SPRAY: 50 SPRAY, METERED NASAL at 08:40

## 2020-02-03 RX ADMIN — LISINOPRIL 10 MG: 10 TABLET ORAL at 08:39

## 2020-02-03 RX ADMIN — TOBRAMYCIN AND DEXAMETHASONE 1 DROP: 3; 1 SUSPENSION/ DROPS OPHTHALMIC at 06:33

## 2020-02-03 NOTE — PLAN OF CARE
Problem: Prexisting or High Potential for Compromised Skin Integrity  Goal: Skin integrity is maintained or improved  Description  INTERVENTIONS:  - Identify patients at risk for skin breakdown  - Assess and monitor skin integrity  - Assess and monitor nutrition and hydration status  - Monitor labs   - Assess for incontinence   - Turn and reposition patient  - Assist with mobility/ambulation  - Relieve pressure over bony prominences  - Avoid friction and shearing  - Provide appropriate hygiene as needed including keeping skin clean and dry  - Evaluate need for skin moisturizer/barrier cream  - Collaborate with interdisciplinary team   - Patient/family teaching  - Consider wound care consult   Outcome: Progressing     Problem: Potential for Falls  Goal: Patient will remain free of falls  Description  INTERVENTIONS:  - Assess patient frequently for physical needs  -  Identify cognitive and physical deficits and behaviors that affect risk of falls    -  [place] fall precautions as indicated by assessment   - Educate patient/family on patient safety including physical limitations  - Instruct patient to call for assistance with activity based on assessment  - Modify environment to reduce risk of injury  - Consider OT/PT consult to assist with strengthening/mobility  Outcome: Progressing     Problem: SKIN/TISSUE INTEGRITY - ADULT  Goal: Skin integrity remains intact  Description  INTERVENTIONS  - Identify patients at risk for skin breakdown  - Assess and monitor skin integrity  - Assess and monitor nutrition and hydration status  - Monitor labs (i e  albumin)  - Assess for incontinence   - Turn and reposition patient  - Assist with mobility/ambulation  - Relieve pressure over bony prominences  - Avoid friction and shearing  - Provide appropriate hygiene as needed including keeping skin clean and dry  - Evaluate need for skin moisturizer/barrier cream  - Collaborate with interdisciplinary team (i e  Nutrition, Rehabilitation, etc )   - Patient/family teaching  Outcome: Progressing  Goal: Incision(s), wounds(s) or drain site(s) healing without S/S of infection  Description  INTERVENTIONS  - Assess and document risk factors for skin impairment   - Assess and document dressing, incision, wound bed, drain sites and surrounding tissue  - Consider nutrition services referral as needed  - Oral mucous membranes remain intact  - Provide patient/ family education  Outcome: Progressing     Problem: PAIN - ADULT  Goal: Verbalizes/displays adequate comfort level or baseline comfort level  Description  Interventions:  - Encourage patient to monitor pain and request assistance  - Assess pain using appropriate pain scale  - Administer analgesics based on type and severity of pain and evaluate response  - Implement non-pharmacological measures as appropriate and evaluate response  - Consider cultural and social influences on pain and pain management  - Notify physician/advanced practitioner if interventions unsuccessful or patient reports new pain  Outcome: Progressing     Problem: INFECTION - ADULT  Goal: Absence or prevention of progression during hospitalization  Description  INTERVENTIONS:  - Assess and monitor for signs and symptoms of infection  - Monitor lab/diagnostic results  - Monitor all insertion sites, i e  indwelling lines, tubes, and drains  - Monitor endotracheal if appropriate and nasal secretions for changes in amount and color  - [place] appropriate cooling/warming therapies per order  - Administer medications as ordered  - Instruct and encourage patient and family to use good hand hygiene technique  - Identify and instruct in appropriate isolation precautions for identified infection/condition  Outcome: Progressing     Problem: SAFETY ADULT  Goal: Maintain or return to baseline ADL function  Description  INTERVENTIONS:  -  Assess patient's ability to carry out ADLs; assess patient's baseline for ADL function and identify physical deficits which impact ability to perform ADLs (bathing, care of mouth/teeth, toileting, grooming, dressing, etc )  - Assess/evaluate cause of self-care deficits   - Assess range of motion  - Assess patient's mobility; develop plan if impaired  - Assess patient's need for assistive devices and provide as appropriate  - Encourage maximum independence but intervene and supervise when necessary  - Involve family in performance of ADLs  - Assess for home care needs following discharge   - Consider OT consult to assist with ADL evaluation and planning for discharge  - Provide patient education as appropriate  Outcome: Progressing  Goal: Maintain or return mobility status to optimal level  Description  INTERVENTIONS:  - Assess patient's baseline mobility status (ambulation, transfers, stairs, etc )    - Identify cognitive and physical deficits and behaviors that affect mobility  - Identify mobility aids required to assist with transfers and/or ambulation (gait belt, sit-to-stand, lift, walker, cane, etc )  - [place] fall precautions as indicated by assessment  - Record patient progress and toleration of activity level on Mobility SBAR; progress patient to next Phase/Stage  - Instruct patient to call for assistance with activity based on assessment  - Consider rehabilitation consult to assist with strengthening/weightbearing, etc   Outcome: Progressing     Problem: DISCHARGE PLANNING  Goal: Discharge to home or other facility with appropriate resources  Description  INTERVENTIONS:  - Identify barriers to discharge w/patient and caregiver  - Arrange for needed discharge resources and transportation as appropriate  - Identify discharge learning needs (meds, wound care, etc )  - Arrange for interpretive services to assist at discharge as needed  - Refer to Case Management Department for coordinating discharge planning if the patient needs post-hospital services based on physician/advanced practitioner order or complex needs related to functional status, cognitive ability, or social support system  Outcome: Progressing     Problem: Knowledge Deficit  Goal: Patient/family/caregiver demonstrates understanding of disease process, treatment plan, medications, and discharge instructions  Description  Complete learning assessment and assess knowledge base  Interventions:  - Provide teaching at level of understanding  - Provide teaching via preferred learning methods  Outcome: Progressing     Problem: Nutrition/Hydration-ADULT  Goal: Nutrient/Hydration intake appropriate for improving, restoring or maintaining nutritional needs  Description  Monitor and assess patient's nutrition/hydration status for malnutrition  Collaborate with interdisciplinary team and initiate plan and interventions as ordered  Monitor patient's weight and dietary intake as ordered or per policy  Utilize nutrition screening tool and intervene as necessary  Determine patient's food preferences and provide high-protein, high-caloric foods as appropriate       INTERVENTIONS:  - Monitor oral intake, urinary output, labs, and treatment plans  - Assess nutrition and hydration status and recommend course of action  - Evaluate amount of meals eaten  - Assist patient with eating if necessary   - Allow adequate time for meals  - Recommend/ encourage appropriate diets, oral nutritional supplements, and vitamin/mineral supplements  - Order, calculate, and assess calorie counts as needed  - Recommend, monitor, and adjust tube feedings and TPN/PPN based on assessed needs  - Assess need for intravenous fluids  - Provide specific nutrition/hydration education as appropriate  - Include patient/family/caregiver in decisions related to nutrition  Outcome: Progressing

## 2020-02-03 NOTE — ASSESSMENT & PLAN NOTE
Potassium noted at 3 6  Will discharge patient on maintenance potassium which has remained low during this admission

## 2020-02-03 NOTE — DISCHARGE INSTR - AVS FIRST PAGE
Dear Hudson Omer,     [facility]  It is our hope that we were always able to exceed the expected standards for your care during your stay  You were hospitalized due to infected shingles rash  You were cared for on the 3rd floor by Any Arias MD under the service of Calista Guzman MD with the [facility] who covers for your primary care physician (PCP), Marsha Briggs DO, while you were hospitalized  If you have any questions or concerns related to this hospitalization, you may contact us at [phone]  For follow up as well as any medication refills, we recommend that you follow up with your primary care physician  A registered nurse will reach out to you by phone within a few days after your discharge to answer any additional questions that you may have after going home  However, at this time we provide for you here, the most important instructions / recommendations at discharge:     · Notable Medication Adjustments -   · Potassium supplements  · Testing Required after Discharge -   · None  · Important follow up information -   · Follow-up with primary care physician  · Other Instructions -   · None  · Please review this entire after visit summary as additional general instructions including medication list, appointments, activity, diet, any pertinent wound care, and other additional recommendations from your care team that may be provided for you        Sincerely,     Any Arias MD

## 2020-02-03 NOTE — DISCHARGE SUMMARY
Discharge- [place] 1935, 81 y o  female MRN: [id number]    Unit/Bed#: S -01 Encounter: [id number]    Primary Care Provider: Veronica Ríos DO   Date and time admitted to hospital: 1/24/2020  5:50 PM        * Periorbital cellulitis of left eye  Assessment & Plan  POA, left periorbital rash with yellowish pustules and yellowish discharge from the left eye with ecchymosis of the left eye for the last 4 days  Was associated with pain 1st day however patient denies any current pain    · CT orbits/temporal bones/skull base wo contrast: Diffuse acute preseptal cellulitis with periorbital edema  · Possibly bacterial periorbital cellulitis superimposed on shingles  · Blood cultures negative  Plan:  · Antibiotic therapy completed  · Shingles rash crusted over and healing  · No sign of active infection remain off antibiotics    Herpes zoster conjunctivitis of left eye  Assessment & Plan  Herpes Zoster ophthalmicus    · Completed course of therapy for her zoster conjunctivitis  · Patient clinically stable  · No obvious ulcerations to conjunctiva    Hypokalemia  Assessment & Plan  Potassium noted at 3 6  Will discharge patient on maintenance potassium which has remained low during this admission  Goals of care, counseling/discussion  Assessment & Plan  · Appreciate palliative care input  · Disease focused care  · Will proceed with plan for STR  · If patient is not successful at rehab family, family will consider hospice at that time  Ambulatory dysfunction  Assessment & Plan  Appreciate OT and PT eval   PT recommends short-term skilled PT, 24 hour supervision/assist, as son is not unable to care for patient at this moment  Dysphagia  Assessment & Plan  · Patient noted to be tolerating meals poorly  · Appreciate eval and recommendation    · Assessment limited due to patient's poor cooperation secondary to advanced dementia    · SL   Recommend level 2 diet and thin liquids   · Meds: whole with puree/crush large pills   · Frequent Oral care: 4x/day  · Aspiration precautions    CKD (chronic kidney disease)  Assessment & Plan  Baseline creatinine around 1 1  · Avoid hypotension/nephrotoxin   · Monitor I&Os  · Monitor BMP    KEYONNA (acute kidney injury) ([facility] [address] )  Assessment & Plan  Workup:   Present on admission (POA) ; admission creatinine: 1 46 (baseline around 1 1),   · Resolved with IVF hydration   Creatinine at baseline    Hyperlipidemia  Assessment & Plan  Continue Lipitor    Late onset Alzheimer's disease without behavioral disturbance ([facility] [address] )  Assessment & Plan  Spoke with patient's daughter and son, they reported that patient has been having disorientation, jerky arms movement/tremors and slurred/incoherent speech for the last 6 months    · Continue home medication (RAZADYNE)  · Patient's long-term prognosis is poor due to her advanced and progressive Alzheimer's disease    Benign essential hypertension  Assessment & Plan  Blood pressure currently stable  · Continue nebivolol and lisinopril  · hydralazine p r n    · Monitor blood pressure          Discharging Resident: Margarita Ferreira MD  Attending: Sarah Chan MD  PCP: Favian Estevez DO  Admission Date: 1/24/2020  Discharge Date: 02/03/20    Disposition:      Short Term Rehab or SNF at [facility]      Reason for Admission:  Herpes zoster ophthalmicus superimposed bacterial infection  Consultations During Hospital Stay:  · Ophthalmology  · Palliative care  Procedures Performed:     · None  Medication Adjustments and Discharge Medications:  · Summary of Medication Adjustments made as a result of this hospitalization:  Maintenance potassium  · Medication Dosing Tapers - Please refer to Discharge Medication List for details on any medication dosing tapers (if applicable to patient)  · Medications being temporarily held (include recommended restart time):  None    · Discharge Medication List: See after visit summary for reconciled discharge medications  Wound Care Recommendations:  When applicable, please see wound care section of After Visit Summary  Diet Recommendations at Discharge:  Diet -        Diet Orders   (From admission, onward)             Start     Ordered    01/28/20 1136  Dietary nutrition supplements  Once     Question Answer Comment   Select Supplement: Ensure Compact-Vanilla    Frequency Breakfast, Dinner        01/28/20 1136    01/27/20 1533  Diet Dysphagia/Modified Consistency; Dysphagia 2-Mechanical Soft; Thin Liquid  Diet effective now     Question Answer Comment   Diet Type Dysphagia/Modified Consistency    Dysphagia/Modified Consistency Dysphagia 2-Mechanical Soft    Liquid Modifier Thin Liquid    RD to adjust diet per protocol? Yes        01/27/20 1533    01/24/20 2015  Room Service  Once     Question:  Type of Service  Answer:  Room Service - Appropriate with Assistance    01/24/20 2014              Fluid Restriction - No Fluid Restriction at Discharge  Instructions for any Catheters / Lines Present at Discharge (including removal date, if applicable):  None  Significant Findings / Test Results:     · As noted in the assessment and plans  Incidental Findings:   · None  Test Results Pending at Discharge (will require follow up): · None  Outpatient Tests Requested:  · Recommend follow-up potassium level which is remained low during this admission  Complications:  None  Hospital Course:     Natalio Mckenna is a 81 y o  female patient with advanced dementia who originally presented to the hospital on 1/24/2020 due to preseptal cellulitis and herpes zoster ophthalmicus  She was transferred from the [facility] to the [facility] on 01/24 for further care and ophthalmic evaluation  The patient is moderately demented and unable to provide history  Ophthamology consulted   Patient noted to have Left V1 herpes zoster ophthalmicus with anterior segment involvement but no retinopathy  Patient treated with IV antibiotics and IV acyclovir in addition to tobramycin dexamethasone topically q i d  For 10 days  Completed a total course of antiviral and antibiotic treatment  Potassium was however noted to be persistently low during this admission  Patient discharged on maintenance potassium  PT evaluated patient and felt that she would benefit from rehab  Condition at Discharge: stable     Discharge Day Visit / Exam:     Subjective:  Patient seen and examined at bedside  Sitting comfortably in a chair in no apparent distress obvious pain  Review of systems limited due to advanced dementia  Vitals: Blood Pressure: 160/88 (02/03/20 0700)  Pulse: 70 (02/03/20 0700)  Temperature: 98 8 °F (37 1 °C) (02/03/20 0700)  Temp Source: Axillary (02/03/20 0700)  Respirations: 19 (02/03/20 0700)  Weight - Scale: 68 kg (149 lb 14 6 oz) (01/24/20 1756)  SpO2: 95 % (02/03/20 0700)  Exam:   Physical Exam   Constitutional: She is oriented to person, place, and time  She appears well-developed  No distress  HENT:   Head: Normocephalic and atraumatic  Crusted and healing herpes zoster rash  Eyes: Pupils are equal, round, and reactive to light  EOM are normal    Cardiovascular: Normal rate, regular rhythm and normal heart sounds  No murmur heard  Pulmonary/Chest: Effort normal and breath sounds normal  She has no wheezes  She has no rales  Abdominal: Soft  Bowel sounds are normal  She exhibits no distension  There is no tenderness  Musculoskeletal: She exhibits no edema  Neurological: She is alert and oriented to person, place, and time  Skin: Skin is warm and dry  She is not diaphoretic  Psychiatric: She has a normal mood and affect  Nursing note and vitals reviewed  Discussion with Family:  Discussed with son, Urmila Estrella, at bedside      Goals of Care Discussions:  · Code Status at Discharge: Level 3 - DNAR and DNI  · Were there any Goals of Care Discussions during Hospitalization?: Yes  · Results of any General Goals of Care Discussions:  Disease care focus    · POLST Completed: No   · If POLST Completed, Summary of POLST Agreement Provided Here:  None   · OK to Rehospitalize if Needed? Yes    Discharge instructions/Information to patient and family:   See after visit summary section titled Discharge Instructions for information provided to patient and family        Planned Readmission:  None      ** Please Note: This note has been constructed using a voice recognition system **

## 2020-02-03 NOTE — SOCIAL WORK
CM made aware patient is medically stable [facility] [place]  CM met with patient and son at bedside to follow up on DCP  Son made aware and requesting transport be arranged  CM called and spoke to Slovmilana with [facility] to arrange BLS transport for 330pm   Medical necessity completed, placed in chart, copy placed in medical records bin  Facility contacts completed  CM made patient, patient's son, bedside RN Vince Gavin, and Sendy Morales from facility aware of transport time  IMM reviewed with son and copy provided

## 2020-02-03 NOTE — ASSESSMENT & PLAN NOTE
Workup:   Present on admission (POA) ; admission creatinine: 1 46 (baseline around 1 1),   · Resolved with IVF hydration   Creatinine at baseline

## 2020-02-03 NOTE — ASSESSMENT & PLAN NOTE
Spoke with patient's daughter and son, they reported that patient has been having disorientation, jerky arms movement/tremors and slurred/incoherent speech for the last 6 months    · Continue home medication (Elizabeth Quintero)  · Patient's long-term prognosis is poor due to her advanced and progressive Alzheimer's disease

## 2020-02-03 NOTE — ASSESSMENT & PLAN NOTE
POA, left periorbital rash with yellowish pustules and yellowish discharge from the left eye with ecchymosis of the left eye for the last 4 days  Was associated with pain 1st day however patient denies any current pain    · CT orbits/temporal bones/skull base wo contrast: Diffuse acute preseptal cellulitis with periorbital edema  · Possibly bacterial periorbital cellulitis superimposed on shingles  · Blood cultures negative  Plan:  · Antibiotic therapy completed  · Shingles rash crusted over and healing    · No sign of active infection remain off antibiotics

## 2020-02-03 NOTE — TRANSPORTATION MEDICAL NECESSITY
Section I - General Information    Name of Patient: Ivett Pop                 : 1935    Medicare #: [id number]  Transport Date: 20 (PCS is valid for round trips on this date and for all repetitive trips in the 60-day range as noted below )  Origin: [address] [place] [address] Road: [facility]  Is the pt's stay covered under Medicare Part A (PPS/DRG)   [x]     Closest appropriate facility? If no, why is transport to more distant facility required? Yes  If hospice pt, is this transport related to pt's terminal illness? NA       Section II - Medical Necessity Questionnaire  Ambulance transportation is medically necessary only if other means of transport are contraindicated or would be potentially harmful to the patient  To meet this requirement, the patient must either be "bed confined" or suffer from a condition such that transport by means other than ambulance is contraindicated by the patient's condition  The following questions must be answered by the medical professional signing below for this form to be valid:    1)  Describe the MEDICAL CONDITION (physical and/or mental) of this patient AT [address] [place] [address] that requires the patient to be transported in an ambulance and why transport by other means is contraindicated by the patient's condition: Patient is confused-only oriented to person, contact and airborne precautions for Herpes Zoster, decreased strength, decreased range of motion, impaired balance, decreased mobility and safety awareness, impaired judgment, bey high fall risk, max assist x2, bed bound, moderate/severe pain on movement, unable to tolerate seated position needed for time to transport  2) Is the patient "bed confined" as defined below?      Yes  To be "be confined" the patient must satisfy all three of the following conditions: (1) unable to get up from bed without Assistance; AND (2) unable to ambulate; AND (3) unable to sit in a chair or wheelchair  3) Can this patient safely be transported by car or wheelchair van (i e , seated during transport without a medical attendant or monitoring)? No    4) In addition to completing questions 1-3 above, please check any of the following conditions that apply*:   *Note: supporting documentation for any boxes checked must be maintained in the patient's medical records  If hosp-hosp transfer, describe services needed at 2nd facility not available at 1st facility? Patient is confused  Moderate/severe pain on movement   Medical attendant required   Special handling/isolation/infection control precautions required   Unable to tolerate seated position for time needed to transport   Other(specify) facial wounds from shingles, fall risk, contact and airborne precautions      Section III - Signature of Physician or Healthcare Professional  I certify that the above information is true and correct based on my evaluation of this patient, and represent that the patient requires transport by ambulance and that other forms of transport are contraindicated  I understand that this information will be used by the Centers for Medicare and Medicaid Services (CMS) to support the determination of medical necessity for ambulance services, and I represent that I have personal knowledge of the patient's condition at time of transport  [x]  If this box is checked, I also certify that the patient is physically or mentally incapable of signing the ambulance service's claim and that the institution with which I am affiliated has furnished care, services, or assistance to the patient  My signature below is made on behalf of the patient pursuant to 42 CFR §424 36(b)(4)  In accordance with 42 CFR §424 37, the specific reason(s) that the patient is physically or mentally incapable of signing the claim form is as follows: Patient is confused  Signature of Physician* or Healthcare Professional______________________________________________________________  Signature Date 02/03/20 (For scheduled repetitive transports, this form is not valid for transports performed more than 60 days after this date)    Printed Name & Credentials of Physician or Healthcare Professional (MD, DO, RN, etc )________________________________  *Form must be signed by patient's attending physician for scheduled, repetitive transports   For non-repetitive, unscheduled ambulance transports, if unable to obtain the signature of the attending physician, any of the following may sign (choose appropriate option below)  [] Physician Assistant []  Clinical Nurse Specialist []  Registered Nurse  []  Nurse Practitioner  [x] Discharge Planner

## 2020-02-04 ENCOUNTER — PATIENT OUTREACH (OUTPATIENT)
Dept: CASE MANAGEMENT | Facility: OTHER | Age: 85
End: 2020-02-04

## 2020-02-04 ENCOUNTER — TELEPHONE (OUTPATIENT)
Dept: FAMILY MEDICINE CLINIC | Facility: CLINIC | Age: 85
End: 2020-02-04

## 2020-02-04 DIAGNOSIS — Z71.89 COMPLEX CARE COORDINATION: Primary | ICD-10-CM

## 2020-02-04 NOTE — TELEPHONE ENCOUNTER
Brother and sister called back to discuss the patient and her present condition  Pt is in [facility] in [place] but has end stage dementia and will be eventually in Hospice per daughter

## 2020-02-04 NOTE — TELEPHONE ENCOUNTER
Son states that mom was tranferred to another facilty while still contagious with shingles, then when arrived the staff found multiple bed sores that were not seen or told my [facility] son asking for a call please call [phone] also sister # Debby [place] [phone]

## 2020-02-20 ENCOUNTER — TELEPHONE (OUTPATIENT)
Dept: FAMILY MEDICINE CLINIC | Facility: CLINIC | Age: 85
End: 2020-02-20

## 2020-02-20 NOTE — TELEPHONE ENCOUNTER
Patient's son states he needs to speak to Dr Estrada Garay before 2pm today  There's a meeting at 2pm where the discussion is to cancel her Physical Therapy  He wants to know if theree is there anything he can do to help her to continue it because it is needed  Please call to advise

## 2020-02-25 NOTE — PROGRESS NOTES
2/25/20 - Spoke with  covering for Anthony Fitzpatrick at [facility] [place]  States following a family meeting it was decided patient will discharge from the facility with [facility] on Saturday, 2/29/20  Therefore, no longer eligible for KEYONNA/CKD

## 2020-02-26 ENCOUNTER — TELEPHONE (OUTPATIENT)
Dept: FAMILY MEDICINE CLINIC | Facility: CLINIC | Age: 85
End: 2020-02-26

## 2020-02-26 NOTE — TELEPHONE ENCOUNTER
Ena Pennington would like a call back as he just revived a call from [facility], and was informed that her Labs were abnormal and she will be coming home on Hospice 2/26/2020 w/ Avila Springer, they are wanting permission to start  Jelani Filiberto is unsure of what type of IV medications would be administered ?  He would like a call back as soon as possible

## 2020-02-27 NOTE — TELEPHONE ENCOUNTER
James Lim calling in, states they decided against the IV fluids  Patient has not been able to eat or drink anything in days as she is not able to swallow   Patient states that Nurse from Hospice is [facility]) would like a call back as she has been sending emails as well as faxes and needs to speak with you as she will be coming home on [facility] 2/29/2020 [place]  [phone]

## 2020-02-28 ENCOUNTER — TELEPHONE (OUTPATIENT)
Dept: FAMILY MEDICINE CLINIC | Facility: CLINIC | Age: 85
End: 2020-02-28

## 2020-02-29 ENCOUNTER — TELEPHONE (OUTPATIENT)
Dept: OTHER | Facility: OTHER | Age: 85
End: 2020-02-29

## 2020-03-01 NOTE — TELEPHONE ENCOUNTER
Callie Dinero from [facility] [place] [phone]/ Calling to notify that PT  today @1710 on 2020  Kt asking for a call back from DR Wayne Randolph   Thanks

## 2020-03-03 ENCOUNTER — TELEPHONE (OUTPATIENT)
Dept: FAMILY MEDICINE CLINIC | Facility: CLINIC | Age: 85
End: 2020-03-03

## 2020-03-03 NOTE — TELEPHONE ENCOUNTER
Case # [id number]     Login to [employer] to change the municipality form [place] [facility] to [facility]

## 2021-05-24 NOTE — TELEPHONE ENCOUNTER
Returned call-MRI needed for genetic screening-mammogram needed for routine follow up-patient will schedule both Script for mirtazepine 15mg tablets written  Follow clinic instructions for titration, which matches the previous instructions      1st week 1/2 tab qHS, 2nd wk 1 tab qHS, 3rd wk 2 tab qHS, 4th wk 3 tab qHS

## 2023-09-27 NOTE — ASSESSMENT & PLAN NOTE
Blood pressure currently stable      · Continue nebivolol and lisinopril  · hydralazine p r n    · Monitor blood pressure Please contact patient to advise the Xray showed a small fracture on the side of the foot where she is having pain.  I recommend that she return to the clinic for a short boot to be placed, and I will refer to Podiatry for recheck.
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
